# Patient Record
Sex: MALE | Race: BLACK OR AFRICAN AMERICAN | Employment: FULL TIME | URBAN - METROPOLITAN AREA
[De-identification: names, ages, dates, MRNs, and addresses within clinical notes are randomized per-mention and may not be internally consistent; named-entity substitution may affect disease eponyms.]

---

## 2018-01-17 ENCOUNTER — OFFICE VISIT (OUTPATIENT)
Dept: ORTHOPEDIC SURGERY | Age: 63
End: 2018-01-17

## 2018-01-17 ENCOUNTER — HOSPITAL ENCOUNTER (OUTPATIENT)
Dept: PREADMISSION TESTING | Age: 63
Discharge: HOME | End: 2018-01-17
Payer: COMMERCIAL

## 2018-01-17 ENCOUNTER — HOSPITAL ENCOUNTER (OUTPATIENT)
Dept: LAB | Age: 63
Discharge: HOME | End: 2018-01-17

## 2018-01-17 ENCOUNTER — HOSPITAL ENCOUNTER (OUTPATIENT)
Dept: GENERAL RADIOLOGY | Age: 63
Discharge: HOME | End: 2018-01-17
Payer: COMMERCIAL

## 2018-01-17 VITALS
HEART RATE: 107 BPM | OXYGEN SATURATION: 97 % | WEIGHT: 189 LBS | DIASTOLIC BLOOD PRESSURE: 90 MMHG | RESPIRATION RATE: 18 BRPM | BODY MASS INDEX: 27.06 KG/M2 | SYSTOLIC BLOOD PRESSURE: 132 MMHG | HEIGHT: 70 IN

## 2018-01-17 DIAGNOSIS — M87.051 AVASCULAR NECROSIS OF HIP, RIGHT (HCC): Primary | ICD-10-CM

## 2018-01-17 DIAGNOSIS — B20 HIV (HUMAN IMMUNODEFICIENCY VIRUS INFECTION) (HCC): ICD-10-CM

## 2018-01-17 DIAGNOSIS — M16.11 PRIMARY OSTEOARTHRITIS OF RIGHT HIP: ICD-10-CM

## 2018-01-17 DIAGNOSIS — M25.551 RIGHT HIP PAIN: ICD-10-CM

## 2018-01-17 DIAGNOSIS — Z01.810 PRE-OPERATIVE CARDIOVASCULAR EXAMINATION: ICD-10-CM

## 2018-01-17 DIAGNOSIS — Z01.812 BLOOD TESTS PRIOR TO TREATMENT OR PROCEDURE: ICD-10-CM

## 2018-01-17 DIAGNOSIS — M87.051 AVASCULAR NECROSIS OF HIP, RIGHT (HCC): ICD-10-CM

## 2018-01-17 DIAGNOSIS — Z01.811 PRE-OPERATIVE RESPIRATORY EXAMINATION: ICD-10-CM

## 2018-01-17 DIAGNOSIS — M87.051 AVASCULAR NECROSIS OF BONE OF RIGHT HIP (HCC): Primary | ICD-10-CM

## 2018-01-17 LAB
ABO + RH BLD: NORMAL
ANION GAP SERPL CALC-SCNC: 8 MMOL/L (ref 3–18)
APPEARANCE UR: CLEAR
BASOPHILS # BLD: 0 K/UL (ref 0–0.06)
BASOPHILS NFR BLD: 0 % (ref 0–2)
BILIRUB UR QL: NEGATIVE
BLOOD GROUP ANTIBODIES SERPL: NORMAL
BUN SERPL-MCNC: 14 MG/DL (ref 7–18)
BUN/CREAT SERPL: 15 (ref 12–20)
CALCIUM SERPL-MCNC: 9 MG/DL (ref 8.5–10.1)
CHLORIDE SERPL-SCNC: 107 MMOL/L (ref 100–108)
CO2 SERPL-SCNC: 24 MMOL/L (ref 21–32)
COLOR UR: YELLOW
CREAT SERPL-MCNC: 0.94 MG/DL (ref 0.6–1.3)
DIFFERENTIAL METHOD BLD: ABNORMAL
EOSINOPHIL # BLD: 0.2 K/UL (ref 0–0.4)
EOSINOPHIL NFR BLD: 2 % (ref 0–5)
ERYTHROCYTE [DISTWIDTH] IN BLOOD BY AUTOMATED COUNT: 12.3 % (ref 11.6–14.5)
GLUCOSE SERPL-MCNC: 87 MG/DL (ref 74–99)
GLUCOSE UR STRIP.AUTO-MCNC: NEGATIVE MG/DL
HCT VFR BLD AUTO: 41 % (ref 36–48)
HGB BLD-MCNC: 14.4 G/DL (ref 13–16)
HGB UR QL STRIP: NEGATIVE
KETONES UR QL STRIP.AUTO: NEGATIVE MG/DL
LEUKOCYTE ESTERASE UR QL STRIP.AUTO: NEGATIVE
LYMPHOCYTES # BLD: 2.2 K/UL (ref 0.9–3.6)
LYMPHOCYTES NFR BLD: 32 % (ref 21–52)
MCH RBC QN AUTO: 32 PG (ref 24–34)
MCHC RBC AUTO-ENTMCNC: 35.1 G/DL (ref 31–37)
MCV RBC AUTO: 91.1 FL (ref 74–97)
MONOCYTES # BLD: 0.5 K/UL (ref 0.05–1.2)
MONOCYTES NFR BLD: 7 % (ref 3–10)
NEUTS SEG # BLD: 4.1 K/UL (ref 1.8–8)
NEUTS SEG NFR BLD: 59 % (ref 40–73)
NITRITE UR QL STRIP.AUTO: NEGATIVE
PH UR STRIP: 6 [PH] (ref 5–8)
PLATELET # BLD AUTO: 161 K/UL (ref 135–420)
PMV BLD AUTO: 11.3 FL (ref 9.2–11.8)
POTASSIUM SERPL-SCNC: 4 MMOL/L (ref 3.5–5.5)
PROT UR STRIP-MCNC: NEGATIVE MG/DL
RBC # BLD AUTO: 4.5 M/UL (ref 4.7–5.5)
SENTARA SPECIMEN COL,SENBCF: NORMAL
SODIUM SERPL-SCNC: 139 MMOL/L (ref 136–145)
SP GR UR REFRACTOMETRY: 1.03 (ref 1–1.03)
SPECIMEN EXP DATE BLD: NORMAL
UROBILINOGEN UR QL STRIP.AUTO: 1 EU/DL (ref 0.2–1)
WBC # BLD AUTO: 6.9 K/UL (ref 4.6–13.2)

## 2018-01-17 PROCEDURE — 87086 URINE CULTURE/COLONY COUNT: CPT | Performed by: PHYSICIAN ASSISTANT

## 2018-01-17 PROCEDURE — 81003 URINALYSIS AUTO W/O SCOPE: CPT | Performed by: PHYSICIAN ASSISTANT

## 2018-01-17 PROCEDURE — 85025 COMPLETE CBC W/AUTO DIFF WBC: CPT | Performed by: PHYSICIAN ASSISTANT

## 2018-01-17 PROCEDURE — 99001 SPECIMEN HANDLING PT-LAB: CPT | Performed by: PHYSICIAN ASSISTANT

## 2018-01-17 PROCEDURE — 80048 BASIC METABOLIC PNL TOTAL CA: CPT | Performed by: PHYSICIAN ASSISTANT

## 2018-01-17 PROCEDURE — 86901 BLOOD TYPING SEROLOGIC RH(D): CPT | Performed by: PHYSICIAN ASSISTANT

## 2018-01-17 PROCEDURE — 93005 ELECTROCARDIOGRAM TRACING: CPT

## 2018-01-17 PROCEDURE — 71046 X-RAY EXAM CHEST 2 VIEWS: CPT

## 2018-01-17 PROCEDURE — 83036 HEMOGLOBIN GLYCOSYLATED A1C: CPT | Performed by: PHYSICIAN ASSISTANT

## 2018-01-17 RX ORDER — HYDROCODONE BITARTRATE AND ACETAMINOPHEN 7.5; 325 MG/1; MG/1
1-2 TABLET ORAL
Qty: 60 TAB | Refills: 0 | Status: SHIPPED | OUTPATIENT
Start: 2018-01-17 | End: 2018-01-24

## 2018-01-17 NOTE — MR AVS SNAPSHOT
[address] [place] [address], [address] 200 [facility] 
[phone] Patient: Edelmira Parra MRN: [id number] FMB:[birth year] Visit Information Date & Time Provider Department Dept. Phone Encounter #  
 1/17/2018  9:05 AM Belle Gutierrez MD [facility] [phone] [id number] Follow-up Instructions Return if symptoms worsen or fail to improve. Upcoming Health Maintenance Date Due Hepatitis C Screening 1955 DTaP/Tdap/Td series (1 - Tdap) 8/10/1978 FOBT Q 1 YEAR AGE 50-75 8/10/2007 ZOSTER VACCINE AGE 60> 6/10/2017 Influenza Age 5 to Adult 8/1/2017 Allergies as of 1/17/2018  Review Complete On: 1/17/2018 By: Brandy Silva No Known Allergies Current Immunizations  Never Reviewed No immunizations on file. Not reviewed this visit You Were Diagnosed With   
  
 Codes Comments Avascular necrosis of bone of right hip ([facility]    -  Primary ICD-10-CM: M87.051 ICD-9-CM: 733.42 Primary osteoarthritis of right hip     ICD-10-CM: M16.11 
ICD-9-CM: 715.15 severe end stage Right hip pain     ICD-10-CM: M25.551 ICD-9-CM: 719.45   
 HIV (human immunodeficiency virus infection) ([facility].)     ICD-10-CM: B20 
ICD-9-CM: V08 Vitals BP Pulse Resp Height(growth percentile) Weight(growth percentile) SpO2  
 132/90 (BP 1 Location: Left arm, BP Patient Position: Sitting) (!) 107 18 5' 10\" (1.778 m) 189 lb (85.7 kg) 97% BMI Smoking Status 27.12 kg/m2 Never Smoker Vitals History BMI and BSA Data Body Mass Index Body Surface Area  
 27.12 kg/m 2 2.06 m 2 Your Updated Medication List  
  
Notice  As of 1/17/2018  9:47 AM  
 You have not been prescribed any medications. We Performed the Following AMB POC X-RAY RADEX HIP UNI WITH PELVIS 2-3 VIEWS [47431 CPT(R)] Follow-up Instructions Return if symptoms worsen or fail to improve. Patient Instructions Learning About Total Hip Replacement Surgery What is total hip replacement surgery? During total hip replacement surgery, your doctor replaces the worn parts of your hip joint with artificial parts made of metal, ceramic, or plastic. You may want this surgery if you have hip pain and trouble moving that you can't treat in other ways. Osteoarthritis or rheumatoid arthritis can cause these types of problems. Another cause is bone loss due to a poor blood supply. Hip replacement is sometimes done after a hip fracture. How is this surgery done? In traditional hip replacement surgery, your doctor makes a 6- to 10-inch cut (incision) on the side or the back of your hip. Some muscles and other soft tissues, such as ligaments, are cut so the doctor can get to the hip joint. Hip replacement can also be done with one or two smaller cuts. This is called minimally invasive surgery. It may cause less blood loss and a smaller scar. But it can also mean a longer time in surgery, because the surgery is harder to do. And if the new hip can't be fitted properly through the smaller cut, the doctor may have to make a larger opening. A newer type of surgery is done through a small incision in the front (anterior) of the hip. Anterior surgery causes less damage to muscles and other soft tissues than getting to the hip joint from the side or the back. It may help you heal faster and get back to activity sooner. Anterior surgery and minimally invasive surgery require special training and equipment. Your doctor can explain your options and help you understand the risks and benefits of each type of surgery. You will get medicine to make you sleep during the surgery. After making the incision, your doctor will: · Remove the worn bone tissue and cartilage from the hip joint. · Replace the ball at the upper end of your thighbone (femur). · Replace your hip socket with a shell and liner. · Fit the ball into the shell and liner to make a new hip joint. Surgery may take 1 to 3 hours. There are two kinds of replacement joints. · Cemented joints. The cement fits between the new joint and the bone. · Uncemented joints. These have a metal coating with many small openings. The bone is shaped to fit the new joint almost perfectly. But there are some small spaces. Over time, the bone grows to fill these small openings. Sometimes, a doctor will use a cemented ball and an uncemented socket. Your doctor can tell you which type of new hip joint is best for you. What can you expect after a total hip replacement? You may stay in the hospital for 1 to 4 days after surgery. Your physical therapy will start at this time. The physical therapist will show you how to move safely. You will also learn exercises to help you get stronger. You may need physical therapy for several weeks after you leave the hospital. At home you'll keep doing the exercises you learned. You will be able to move around with crutches or a walker. But for a while you will need someone who can help you day and night. You can go home from the hospital if you have help at home and your recovery is going well. If you don't have help at home or if you need extra care, you can go to a rehabilitation center. Your doctor will give you information about what to expect after surgery. How long it takes to recover will depend in part on the type of surgery you have. After traditional surgery, you will slowly return to most of your activities. · You will need to use crutches or a walker for the first few weeks after surgery. · Your doctor will tell you when you can drive again. · You may be able to go back to work in 4 weeks to 4 months. It depends on your job. · Your doctor will tell you when you can do activities like swim, dance, golf, or bicycle. · You may need to avoid some strenuous activities. These may include running, horseback riding, tennis, and any type of skiing. · For most people it is safe to have sex about 4 to 6 weeks after the surgery. It usually takes 3 to 6 months to get back to full activity after traditional surgery. You may recover faster if you have minimally invasive or anterior surgery. After you recover from surgery, you may have much less pain than before and a better quality of life. Most artificial hip joints last for 10 to 20 years or longer. It depends on your age, how much stress you put on the joint, and how well your new joint and bones mend. Your weight can make a difference. Every extra pound of body weight adds 3 pounds of stress to your new hip joint. Controlling your weight can help your new hip joint last longer. It should also last longer if you avoid hard physical work and sports that stress the joint. Your doctor may want to see you about once a year to see how you and your new hip are doing. Follow-up care is a key part of your treatment and safety. Be sure to make and go to all appointments, and call your doctor if you are having problems. It's also a good idea to know your test results and keep a list of the medicines you take. Where can you learn more? Go to http://july-bella.info/. Enter R557 in the search box to learn more about \"Learning About Total Hip Replacement Surgery. \" Current as of: March 21, 2017 Content Version: 11.4 © 0199-5871 [employer]. Care instructions adapted under license by [employer] (which disclaims liability or warranty for this information). If you have questions about a medical condition or this instruction, always ask your healthcare professional. [place] [address] any warranty or liability for your use of this information. Learning About Low-Carbohydrate Diets for Weight Loss What is a low-carbohydrate diet? Low-carb diets avoid foods that are high in carbohydrate. These high-carb foods include pasta, bread, rice, cereal, fruits, and starchy vegetables. Instead, these diets usually have you eat foods that are high in fat and protein. Many people lose weight quickly on a low-carb diet. But the early weight loss is water. People on this diet often gain the weight back after they start eating carbs again. Not all diet plans are safe or work well. A lot of the evidence shows that low-carb diets aren't healthy. That's because these diets often don't include healthy foods like fruits and vegetables. Losing weight safely means balancing protein, fat, and carbs with every meal and snack. And low-carb diets don't always provide the vitamins, minerals, and fiber you need. If you have a serious medical condition, talk to your doctor before you try any diet. These conditions include kidney disease, heart disease, type 2 diabetes, high cholesterol, and high blood pressure. If you are pregnant, it may not be safe for your baby if you are on a low-carb diet. How can you lose weight safely? You might have heard that a diet plan helped another person lose weight. But that doesn't mean that it will work for you. It is very hard to stay on a diet that includes lots of big changes in your eating habits. If you want to get to a healthy weight and stay there, making healthy lifestyle changes will often work better than dieting. These steps can help. · Make a plan for change. Work with your doctor to create a plan that is right for you. · See a dietitian. He or she can show you how to make healthy changes in your eating habits. · Manage stress. If you have a lot of stress in your life, it can be hard to focus on making healthy changes to your daily habits. · Track your food and activity. You are likely to do better at losing weight if you keep track of what you eat and what you do. Follow-up care is a key part of your treatment and safety. Be sure to make and go to all appointments, and call your doctor if you are having problems. It's also a good idea to know your test results and keep a list of the medicines you take. Where can you learn more? Go to http://july-bella.info/. Enter A121 in the search box to learn more about \"Learning About Low-Carbohydrate Diets for Weight Loss. \" Current as of: May 12, 2017 Content Version: 11.4 © 9953-8274 Traveler | VIP. Care instructions adapted under license by [employer] (which disclaims liability or warranty for this information). If you have questions about a medical condition or this instruction, always ask your healthcare professional. [address] any warranty or liability for your use of this information. Introducing [facility] [place] [facility]! [employer] introduces [facility] patient portal. Now you can access parts of your medical record, email your doctor's office, and request medication refills online. 1. In your internet browser, go to [email or web address] 2. Click on the First Time User? Click Here link in the Sign In box. You will see the New Member Sign Up page. 3. Enter your [facility] Access Code exactly as it appears below. You will not need to use this code after youve completed the sign-up process. If you do not sign up before the expiration date, you must request a new code. · [facility] Access Code: [email or web address] Expires: 4/17/2018  9:46 AM 
 
4. Enter the last four digits of your Social Security Number (xxxx) and Date of Birth (mm/dd/yyyy) as indicated and click Submit. You will be taken to the next sign-up page. 5. Create a [employer] ID. This will be your [facility] login ID and cannot be changed, so think of one that is secure and easy to remember. 6. Create a [employer] password. You can change your password at any time. 7. Enter your Password Reset Question and Answer. This can be used at a later time if you forget your password. 8. Enter your e-mail address. You will receive e-mail notification when new information is available in [address] 9. Click Sign Up. You can now view and download portions of your medical record. 10. Click the Download Summary menu link to download a portable copy of your medical information. If you have questions, please visit the Frequently Asked Questions section of the [employer] website. Remember, [employer] is NOT to be used for urgent needs. For medical emergencies, dial 911. Now available from your iPhone and Android! Please provide this summary of care documentation to your next provider. If you have any questions after today's visit, please call [phone].

## 2018-01-17 NOTE — PROGRESS NOTES
Patient: Niko Siegel                MRN: [id number]       SSN: [id number]  YOB: 1955        AGE: 63 y.o. SEX: male    PCP: No primary care provider on file. 01/17/18    Chief Complaint   Patient presents with    Hip Pain     rt     HISTORY:  Niko Siegel is a 63 y.o. male who is seen for right hip pain. He reports pain for since March 2017 with no history of injury. He was previously treated at [facility] in September 2017 and received an injection with some relief. He states he is in severe pain all day every day. He denies any h/o long-term steroid use. He reports that he relates his onset of AVN to his long term use of anti-viral HIV medications. He reports pain radiating down through his right leg. He states he walks with a severe limp. Pain Assessment  1/17/2018   Location of Pain Hip   Location Modifiers Right   Severity of Pain 10   Quality of Pain Sharp; Throbbing;Aching   Duration of Pain Persistent   Frequency of Pain Constant   Aggravating Factors Walking;Standing   Limiting Behavior Yes   Result of Injury No     Occupation, etc:  Mr. ABDI COBB works as a  for [employer]. He lives in [place] with his wife. Current weight is 189 pounds. He is 5'10\" tall. He is HIV positive as a result of long-term IV drug use. He states that his Hepatitis C has cleared up at this point. No results found for: HBA1C, HGBE8, WBU0PKQU, RZN1EYIH, BZX1BACT  Weight Metrics 1/17/2018   Weight 189 lb   BMI 27.12 kg/m2       There is no problem list on file for this patient. REVIEW OF SYSTEMS: All Below are Negative except: See HPI   Constitutional: negative for fever, chills, and weight loss. Cardiovascular: negative for chest pain, claudication, leg swelling, SOB, MCMAHON   Gastrointestinal: Negative for pain, N/V/C/D, Blood in stool or urine, dysuria,  hematuria, incontinence, pelvic pain.    Musculoskeletal: See HPI   Neurological: Negative for dizziness and weakness. Negative for headaches, Visual changes, confusion, seizures   Phychiatric/Behavioral: Negative for depression, memory loss, substance  abuse. Extremities: Negative for hair changes, rash, or skin lesion changes. Hematologic: Negative for bleeding problems, bruising, pallor or swollen lymph  nodes   Peripheral Vascular: No calf pain, no circulation deficits. Social History     Social History    Marital status: UNKNOWN     Spouse name: N/A    Number of children: N/A    Years of education: N/A     Occupational History    Not on file. Social History Main Topics    Smoking status: Never Smoker    Smokeless tobacco: Never Used    Alcohol use No    Drug use: No    Sexual activity: Yes     Partners: Female     Other Topics Concern    Not on file     Social History Narrative    No narrative on file      No Known Allergies   No current outpatient prescriptions on file. No current facility-administered medications for this visit. PHYSICAL EXAMINATION:  Visit Vitals    /90 (BP 1 Location: Left arm, BP Patient Position: Sitting)    Pulse (!) 107    Resp 18    Ht 5' 10\" (1.778 m)    Wt 189 lb (85.7 kg)    SpO2 97%    BMI 27.12 kg/m2      PHYSICAL EXAM:  Visit Vitals    /90 (BP 1 Location: Left arm, BP Patient Position: Sitting)    Pulse (!) 107    Resp 18    Ht 5' 10\" (1.778 m)    Wt 189 lb (85.7 kg)    SpO2 97%    BMI 27.12 kg/m2       Appearance: Alert, well appearing and pleasant patient who is in no distress, oriented to person, place/time, and who follows commands. HEENT: Niko Siegel hears well, does not require hearing aids. His sclera of the eyes are non-icteric. He is breathing normally and no respiratory accessory muscle use is noted. No JVD present and Neck ROM within normal limits. Psychiatric: Affect and mood are appropriate.  Oriented x3  Heart[de-identified]  S1, S2 without murmer, regular rhythm  Lungs:  Breath sounds clear to auscultation  Cardiovascular/Peripheral Vascular: Normal pulses to each foot. Integumentary: No rashes,  wounds, or abrasions. Warm and normal color. No drainage. Gait: severe limp  Sensory Exam: Intact/Normal Sensation    Lymphatic: No evidence of Lymphedema  Vascular:       Pulses: palpable  Varicosities none  Wounds/Abrasion: None Present  Neuro: Negative, no tremors  ORTHO EXAMINATION:  Examination Right hip Left hip   Skin Intact Intact   External Rotation ROM 5 20   Internal Rotation ROM 0 10   Trochanteric tenderness - -   Hip flexion contracture 5 degree -   Antalgic gait + +   Trendelenberg sign + -   Lumbar tenderness - -   Straight leg raise - -   Calf tenderness - -   Neurovascular Intact Intact   Severe pain with hip rotation  Wearing a soft knee sleeve on the right     MRI RIGHT HIP 7/21/17  IMPRESSION:   1. Avascular necrosis of the right femoral head noted although no appreciable subchondral flattening/collapse is seen. There is surrounding reactive marrow edema which extends caudally to the proximal intertrochanteric region of the right hip joint noted. Mild to moderate degenerative changes of the right hip joint noted. 2. Moderate T2 hyperintense soft tissue signal intensite noted about the right hip joint. Nonspecific whether related to reactive effusion or synovitis. 3. A smaller focus of avascular necrosis arises along the anterior superior margin of the contralateral left femoral head. 4. Mild partial thickness tearing of the left common hamstring tendon origin noted. 5. An intermuscular lipoma measuring 2.4x4.5x8.3 cm arises between the right rectus femoris, tensor fascia starla, and vastus lateralis muscles. RADIOGRAPHS:  XR RIGHT HIP 1/17/18  IMPRESSION:  AP pelvis and two views - No fractures, complete joint space narrowing, + osteophytes present. Evidence of AVN on the right. + subchondral collapse/fragmentation. IMPRESSION:      ICD-10-CM ICD-9-CM    1.  Avascular necrosis of bone of right hip (HCC) M87.051 733.42 AMB SUPPLY ORDER      HYDROcodone-acetaminophen (NORCO) 7.5-325 mg per tablet   2. Primary osteoarthritis of right hip M16.11 715.15 AMB SUPPLY ORDER      HYDROcodone-acetaminophen (NORCO) 7.5-325 mg per tablet    severe end stage   3. Right hip pain M25.551 719.45 AMB SUPPLY ORDER      HYDROcodone-acetaminophen (NORCO) 7.5-325 mg per tablet   4. HIV (human immunodeficiency virus infection) ([facility] B20 V08      PLAN:  He will be scheduled for a right JESSICA. Risks of surgery outlined and informed consent obtained. Continue weight loss efforts with a low carb diet. He was provided with a single point cane today. He will take Norco for the pain as needed at night. He was provided with a note for work today.      Scribed by Darian Jordan ([facility]) as dictated by Johnathan Vines MD

## 2018-01-17 NOTE — LETTER
1/17/2018 9:46 AM 
 
Mr. [employer] [address] [facility] [place][note 2] [address] THE ABOVE PATIENT WAS SEEN TODAY.  
 
 
Sincerely, 
 
 
Kain Fung MD

## 2018-01-17 NOTE — PATIENT INSTRUCTIONS
Learning About Total Hip Replacement Surgery  What is total hip replacement surgery? During total hip replacement surgery, your doctor replaces the worn parts of your hip joint with artificial parts made of metal, ceramic, or plastic. You may want this surgery if you have hip pain and trouble moving that you can't treat in other ways. Osteoarthritis or rheumatoid arthritis can cause these types of problems. Another cause is bone loss due to a poor blood supply. Hip replacement is sometimes done after a hip fracture. How is this surgery done? In traditional hip replacement surgery, your doctor makes a 6- to 10-inch cut (incision) on the side or the back of your hip. Some muscles and other soft tissues, such as ligaments, are cut so the doctor can get to the hip joint. Hip replacement can also be done with one or two smaller cuts. This is called minimally invasive surgery. It may cause less blood loss and a smaller scar. But it can also mean a longer time in surgery, because the surgery is harder to do. And if the new hip can't be fitted properly through the smaller cut, the doctor may have to make a larger opening. A newer type of surgery is done through a small incision in the front (anterior) of the hip. Anterior surgery causes less damage to muscles and other soft tissues than getting to the hip joint from the side or the back. It may help you heal faster and get back to activity sooner. Anterior surgery and minimally invasive surgery require special training and equipment. Your doctor can explain your options and help you understand the risks and benefits of each type of surgery. You will get medicine to make you sleep during the surgery. After making the incision, your doctor will:  · Remove the worn bone tissue and cartilage from the hip joint. · Replace the ball at the upper end of your thighbone (femur). · Replace your hip socket with a shell and liner.   · Fit the ball into the shell and liner to make a new hip joint. Surgery may take 1 to 3 hours. There are two kinds of replacement joints. · Cemented joints. The cement fits between the new joint and the bone. · Uncemented joints. These have a metal coating with many small openings. The bone is shaped to fit the new joint almost perfectly. But there are some small spaces. Over time, the bone grows to fill these small openings. Sometimes, a doctor will use a cemented ball and an uncemented socket. Your doctor can tell you which type of new hip joint is best for you. What can you expect after a total hip replacement? You may stay in the hospital for 1 to 4 days after surgery. Your physical therapy will start at this time. The physical therapist will show you how to move safely. You will also learn exercises to help you get stronger. You may need physical therapy for several weeks after you leave the hospital. At home you'll keep doing the exercises you learned. You will be able to move around with crutches or a walker. But for a while you will need someone who can help you day and night. You can go home from the hospital if you have help at home and your recovery is going well. If you don't have help at home or if you need extra care, you can go to a rehabilitation center. Your doctor will give you information about what to expect after surgery. How long it takes to recover will depend in part on the type of surgery you have. After traditional surgery, you will slowly return to most of your activities. · You will need to use crutches or a walker for the first few weeks after surgery. · Your doctor will tell you when you can drive again. · You may be able to go back to work in 4 weeks to 4 months. It depends on your job. · Your doctor will tell you when you can do activities like swim, dance, golf, or bicycle. · You may need to avoid some strenuous activities.  These may include running, horseback riding, tennis, and any type of skiing. · For most people it is safe to have sex about 4 to 6 weeks after the surgery. It usually takes 3 to 6 months to get back to full activity after traditional surgery. You may recover faster if you have minimally invasive or anterior surgery. After you recover from surgery, you may have much less pain than before and a better quality of life. Most artificial hip joints last for 10 to 20 years or longer. It depends on your age, how much stress you put on the joint, and how well your new joint and bones mend. Your weight can make a difference. Every extra pound of body weight adds 3 pounds of stress to your new hip joint. Controlling your weight can help your new hip joint last longer. It should also last longer if you avoid hard physical work and sports that stress the joint. Your doctor may want to see you about once a year to see how you and your new hip are doing. Follow-up care is a key part of your treatment and safety. Be sure to make and go to all appointments, and call your doctor if you are having problems. It's also a good idea to know your test results and keep a list of the medicines you take. Where can you learn more? Go to http://july-bella.info/. Enter G858 in the search box to learn more about \"Learning About Total Hip Replacement Surgery. \"  Current as of: March 21, 2017  Content Version: 11.4  © 2690-8828 Healthwise, [employer]. Care instructions adapted under license by [employer] (which disclaims liability or warranty for this information). If you have questions about a medical condition or this instruction, always ask your healthcare professional. [place] [address] any warranty or liability for your use of this information. Learning About Low-Carbohydrate Diets for Weight Loss  What is a low-carbohydrate diet? Low-carb diets avoid foods that are high in carbohydrate.  These high-carb foods include pasta, bread, rice, cereal, fruits, and starchy vegetables. Instead, these diets usually have you eat foods that are high in fat and protein. Many people lose weight quickly on a low-carb diet. But the early weight loss is water. People on this diet often gain the weight back after they start eating carbs again. Not all diet plans are safe or work well. A lot of the evidence shows that low-carb diets aren't healthy. That's because these diets often don't include healthy foods like fruits and vegetables. Losing weight safely means balancing protein, fat, and carbs with every meal and snack. And low-carb diets don't always provide the vitamins, minerals, and fiber you need. If you have a serious medical condition, talk to your doctor before you try any diet. These conditions include kidney disease, heart disease, type 2 diabetes, high cholesterol, and high blood pressure. If you are pregnant, it may not be safe for your baby if you are on a low-carb diet. How can you lose weight safely? You might have heard that a diet plan helped another person lose weight. But that doesn't mean that it will work for you. It is very hard to stay on a diet that includes lots of big changes in your eating habits. If you want to get to a healthy weight and stay there, making healthy lifestyle changes will often work better than dieting. These steps can help. · Make a plan for change. Work with your doctor to create a plan that is right for you. · See a dietitian. He or she can show you how to make healthy changes in your eating habits. · Manage stress. If you have a lot of stress in your life, it can be hard to focus on making healthy changes to your daily habits. · Track your food and activity. You are likely to do better at losing weight if you keep track of what you eat and what you do. Follow-up care is a key part of your treatment and safety. Be sure to make and go to all appointments, and call your doctor if you are having problems.  It's also a good idea to know your test results and keep a list of the medicines you take. Where can you learn more? Go to http://july-bella.info/. Enter A121 in the search box to learn more about \"Learning About Low-Carbohydrate Diets for Weight Loss. \"  Current as of: May 12, 2017  Content Version: 11.4  © 7683-0081 Healthwise, [employer]. Care instructions adapted under license by [email or web address] (which disclaims liability or warranty for this information). If you have questions about a medical condition or this instruction, always ask your healthcare professional. [address] any warranty or liability for your use of this information.

## 2018-01-19 ENCOUNTER — OFFICE VISIT (OUTPATIENT)
Dept: ORTHOPEDIC SURGERY | Facility: CLINIC | Age: 63
End: 2018-01-19

## 2018-01-19 VITALS
HEIGHT: 70 IN | BODY MASS INDEX: 27.03 KG/M2 | TEMPERATURE: 97.4 F | DIASTOLIC BLOOD PRESSURE: 86 MMHG | WEIGHT: 188.8 LBS | RESPIRATION RATE: 18 BRPM | SYSTOLIC BLOOD PRESSURE: 131 MMHG | HEART RATE: 101 BPM | OXYGEN SATURATION: 98 %

## 2018-01-19 DIAGNOSIS — M25.551 RIGHT HIP PAIN: ICD-10-CM

## 2018-01-19 DIAGNOSIS — Z01.812 BLOOD TESTS PRIOR TO TREATMENT OR PROCEDURE: ICD-10-CM

## 2018-01-19 DIAGNOSIS — M87.051 AVASCULAR NECROSIS OF HIP, RIGHT (HCC): Primary | ICD-10-CM

## 2018-01-19 DIAGNOSIS — M16.11 PRIMARY OSTEOARTHRITIS OF RIGHT HIP: ICD-10-CM

## 2018-01-19 DIAGNOSIS — M87.051 AVASCULAR NECROSIS OF BONE OF RIGHT HIP (HCC): Primary | ICD-10-CM

## 2018-01-19 DIAGNOSIS — Z01.818 PREOP GENERAL PHYSICAL EXAM: ICD-10-CM

## 2018-01-19 LAB
ATRIAL RATE: 97 BPM
BACTERIA SPEC CULT: NORMAL
CALCULATED P AXIS, ECG09: 66 DEGREES
CALCULATED R AXIS, ECG10: 75 DEGREES
CALCULATED T AXIS, ECG11: 56 DEGREES
DIAGNOSIS, 93000: NORMAL
HBA1C MFR BLD: 5.7 % (ref 4.2–5.6)
P-R INTERVAL, ECG05: 152 MS
Q-T INTERVAL, ECG07: 342 MS
QRS DURATION, ECG06: 70 MS
QTC CALCULATION (BEZET), ECG08: 434 MS
SERVICE CMNT-IMP: NORMAL
VENTRICULAR RATE, ECG03: 97 BPM

## 2018-01-19 RX ORDER — NABUMETONE 500 MG/1
TABLET, FILM COATED ORAL 2 TIMES DAILY
COMMUNITY
End: 2018-01-24

## 2018-01-19 RX ORDER — DICLOFENAC SODIUM 10 MG/G
GEL TOPICAL 4 TIMES DAILY
COMMUNITY
End: 2018-01-24

## 2018-01-19 RX ORDER — TIZANIDINE HYDROCHLORIDE 4 MG/1
4 CAPSULE, GELATIN COATED ORAL 3 TIMES DAILY
COMMUNITY
End: 2018-01-24

## 2018-01-19 NOTE — LETTER
[facility] - [facility] 
[address] [place] [address] [address] 
[phone] Workl Note Date: 1/19/2018 To Whom It May concern: 
 
Charisse Chavez was seen and treated today in the orthopaedic clinic. Charisse Chavez is having a right total hip replacement on 01/23/18. His time out of work is expected to be 6 months from his surgical date. Please Note: Recoveries post operative are subject to change based on his improvement that is assessed on a regular basis.  
 
 
 
 
 
 
Marlyn Ramos PA-C

## 2018-01-19 NOTE — PROGRESS NOTES
Joya Theodore returns for History and Physical in preparation of upcoming right total hip replacement. Please see the attached forms in Epic for History, Physical, and Review of systems.

## 2018-01-19 NOTE — H&P
History and Physical          63 year AA male seen in preparation for Right total hip arthroplasty  Review of Systems   Constitutional: Positive for activity change (decreased use right hip secondary to endstage osteo arthritis). Negative for chills, fatigue and fever. HENT: Negative for ear pain. Eyes: Negative for pain. Respiratory: Negative for shortness of breath. Cardiovascular: Negative. Endocrine: Negative. Genitourinary: Negative for flank pain. Musculoskeletal: Positive for arthralgias, gait problem, joint swelling and myalgias. Right hip   Skin: Negative. Allergic/Immunologic: Negative. Hematological: Negative. Psychiatric/Behavioral: Negative. Physical Exam   Nursing note and vitals reviewed. Constitutional: He is oriented to person, place, and time. He appears well-developed and well-nourished. HENT:   Head: Normocephalic and atraumatic. Eyes: Conjunctivae and EOM are normal. Pupils are equal, round, and reactive to light. Neck: Normal range of motion. Cardiovascular: Normal rate, regular rhythm, normal heart sounds and intact distal pulses. Pulmonary/Chest: Effort normal and breath sounds normal.   Musculoskeletal:        Right hip: He exhibits decreased range of motion, decreased strength, tenderness and bony tenderness. Legs:  Neurological: He is alert and oriented to person, place, and time. Skin: Skin is warm, dry and intact. Psychiatric: He has a normal mood and affect. His speech is normal and behavior is normal. Judgment and thought content normal. Cognition and memory are normal.     Right hip severe end stage osteo arthritis    Decreased ROM right hip secondary to above    Right total hip arthroplasty    Risk / Benefit: Surgeon will discuss in \"face to face\" encounter on day of surgery.     Family History and Surgical History reviewed, discussed in detail, and deemed Non-Contributory in preparation for this surgical encounter. Orthopaedically, this patient requires admission as predetermined by the attending physicians documented severity of the admitting diagnosis,  and expected need for post surgical and co morbity health management determines length of projected stay.

## 2018-01-22 ENCOUNTER — HOSPITAL ENCOUNTER (OUTPATIENT)
Dept: PREADMISSION TESTING | Age: 63
Discharge: HOME | End: 2018-01-22
Payer: COMMERCIAL

## 2018-01-22 ENCOUNTER — ANESTHESIA EVENT (OUTPATIENT)
Dept: SURGERY | Age: 63
DRG: 469 | End: 2018-01-22
Payer: COMMERCIAL

## 2018-01-22 DIAGNOSIS — M16.11 PRIMARY OSTEOARTHRITIS OF RIGHT HIP: ICD-10-CM

## 2018-01-22 DIAGNOSIS — M87.051 AVASCULAR NECROSIS OF HIP, RIGHT (HCC): ICD-10-CM

## 2018-01-22 DIAGNOSIS — M25.551 RIGHT HIP PAIN: ICD-10-CM

## 2018-01-22 DIAGNOSIS — Z01.812 BLOOD TESTS PRIOR TO TREATMENT OR PROCEDURE: ICD-10-CM

## 2018-01-22 LAB
APTT PPP: 31.1 SEC (ref 23–36.4)
INR PPP: 1.3 (ref 0.8–1.2)
PROTHROMBIN TIME: 15.3 SEC (ref 11.5–15.2)

## 2018-01-22 PROCEDURE — 85730 THROMBOPLASTIN TIME PARTIAL: CPT | Performed by: PHYSICIAN ASSISTANT

## 2018-01-22 PROCEDURE — 85610 PROTHROMBIN TIME: CPT | Performed by: PHYSICIAN ASSISTANT

## 2018-01-22 PROCEDURE — 36415 COLL VENOUS BLD VENIPUNCTURE: CPT | Performed by: PHYSICIAN ASSISTANT

## 2018-01-22 NOTE — PROGRESS NOTES
Mr Autumn Thacker and family attended the Joint Replacement Pre-Operative class on 01/23/2018. .  Topics discussed included surgery preparation, what to expect the day of surgery, medications, physical and occupational therapy, and discharge planning. It was discussed that this is considered an elective surgery and that prior to the surgery they need to make decisions such as arranging for help at home once they are discharged. He was given a total hip replacement patient education notebook to take home. Opportunity was given to ask questions and phone number of the Orthopaedic Nurse Clinician was given for any questions or concerns that may arise later. Patient states he has been doing his washes the last two nights.

## 2018-01-23 ENCOUNTER — ANESTHESIA (OUTPATIENT)
Dept: SURGERY | Age: 63
DRG: 469 | End: 2018-01-23
Payer: COMMERCIAL

## 2018-01-23 ENCOUNTER — HOSPITAL ENCOUNTER (INPATIENT)
Age: 63
LOS: 1 days | Discharge: HOME | DRG: 469 | End: 2018-01-24
Attending: SPECIALIST | Admitting: SPECIALIST
Payer: COMMERCIAL

## 2018-01-23 DIAGNOSIS — G89.18 POST-OP PAIN: Primary | ICD-10-CM

## 2018-01-23 PROBLEM — M16.11 OSTEOARTHRITIS OF RIGHT HIP: Status: ACTIVE | Noted: 2018-01-23

## 2018-01-23 LAB
AMPHET UR QL SCN: NEGATIVE
BARBITURATES UR QL SCN: NEGATIVE
BENZODIAZ UR QL: NEGATIVE
CANNABINOIDS UR QL SCN: NEGATIVE
COCAINE UR QL SCN: NEGATIVE
HCT VFR BLD AUTO: 35.2 % (ref 36–48)
HDSCOM,HDSCOM: NORMAL
HGB BLD-MCNC: 12.2 G/DL (ref 13–16)
METHADONE UR QL: NEGATIVE
OPIATES UR QL: NEGATIVE
PCP UR QL: NEGATIVE

## 2018-01-23 PROCEDURE — 74011250637 HC RX REV CODE- 250/637: Performed by: SPECIALIST

## 2018-01-23 PROCEDURE — 74011250636 HC RX REV CODE- 250/636: Performed by: NURSE ANESTHETIST, CERTIFIED REGISTERED

## 2018-01-23 PROCEDURE — 77030020782 HC GWN BAIR PAWS FLX 3M -B: Performed by: SPECIALIST

## 2018-01-23 PROCEDURE — 76210000006 HC OR PH I REC 0.5 TO 1 HR: Performed by: SPECIALIST

## 2018-01-23 PROCEDURE — C1776 JOINT DEVICE (IMPLANTABLE): HCPCS | Performed by: SPECIALIST

## 2018-01-23 PROCEDURE — 77030026438 HC STYL ET INTUB CARD -A: Performed by: ANESTHESIOLOGY

## 2018-01-23 PROCEDURE — 74011258636 HC RX REV CODE- 258/636: Performed by: SPECIALIST

## 2018-01-23 PROCEDURE — 77030008683 HC TU ET CUF COVD -A: Performed by: ANESTHESIOLOGY

## 2018-01-23 PROCEDURE — 77030006835 HC BLD SAW SAG STRY -B: Performed by: SPECIALIST

## 2018-01-23 PROCEDURE — 97116 GAIT TRAINING THERAPY: CPT

## 2018-01-23 PROCEDURE — 77030031139 HC SUT VCRL2 J&J -A: Performed by: SPECIALIST

## 2018-01-23 PROCEDURE — 77030027138 HC INCENT SPIROMETER -A

## 2018-01-23 PROCEDURE — 77030018836 HC SOL IRR NACL ICUM -A: Performed by: SPECIALIST

## 2018-01-23 PROCEDURE — 74011000250 HC RX REV CODE- 250: Performed by: NURSE ANESTHETIST, CERTIFIED REGISTERED

## 2018-01-23 PROCEDURE — 77030034850: Performed by: SPECIALIST

## 2018-01-23 PROCEDURE — 74011250636 HC RX REV CODE- 250/636: Performed by: SPECIALIST

## 2018-01-23 PROCEDURE — 77030032490 HC SLV COMPR SCD KNE COVD -B: Performed by: SPECIALIST

## 2018-01-23 PROCEDURE — 77030008467 HC STPLR SKN COVD -B: Performed by: SPECIALIST

## 2018-01-23 PROCEDURE — 36415 COLL VENOUS BLD VENIPUNCTURE: CPT | Performed by: SPECIALIST

## 2018-01-23 PROCEDURE — 74011250637 HC RX REV CODE- 250/637: Performed by: NURSE ANESTHETIST, CERTIFIED REGISTERED

## 2018-01-23 PROCEDURE — 65270000029 HC RM PRIVATE

## 2018-01-23 PROCEDURE — 85018 HEMOGLOBIN: CPT | Performed by: SPECIALIST

## 2018-01-23 PROCEDURE — 77030011265 HC ELECTRD BLD HEX COVD -A: Performed by: SPECIALIST

## 2018-01-23 PROCEDURE — 74011000272 HC RX REV CODE- 272: Performed by: SPECIALIST

## 2018-01-23 PROCEDURE — 64520 N BLOCK LUMBAR/THORACIC: CPT | Performed by: ANESTHESIOLOGY

## 2018-01-23 PROCEDURE — 77030011640 HC PAD GRND REM COVD -A: Performed by: SPECIALIST

## 2018-01-23 PROCEDURE — 74011250636 HC RX REV CODE- 250/636: Performed by: PHYSICIAN ASSISTANT

## 2018-01-23 PROCEDURE — 77030013708 HC HNDPC SUC IRR PULS STRY –B: Performed by: SPECIALIST

## 2018-01-23 PROCEDURE — 74011000250 HC RX REV CODE- 250

## 2018-01-23 PROCEDURE — 77030019908 HC STETH ESOPH SIMS -A: Performed by: ANESTHESIOLOGY

## 2018-01-23 PROCEDURE — 77030020788: Performed by: SPECIALIST

## 2018-01-23 PROCEDURE — 77030003666 HC NDL SPINAL BD -A: Performed by: SPECIALIST

## 2018-01-23 PROCEDURE — 74011250636 HC RX REV CODE- 250/636

## 2018-01-23 PROCEDURE — 80307 DRUG TEST PRSMV CHEM ANLYZR: CPT | Performed by: SPECIALIST

## 2018-01-23 PROCEDURE — 77030013079 HC BLNKT BAIR HGGR 3M -A: Performed by: ANESTHESIOLOGY

## 2018-01-23 PROCEDURE — 97162 PT EVAL MOD COMPLEX 30 MIN: CPT

## 2018-01-23 PROCEDURE — 76010000172 HC OR TIME 2.5 TO 3 HR INTENSV-TIER 1: Performed by: SPECIALIST

## 2018-01-23 PROCEDURE — 0SR904A REPLACEMENT OF RIGHT HIP JOINT WITH CERAMIC ON POLYETHYLENE SYNTHETIC SUBSTITUTE, UNCEMENTED, OPEN APPROACH: ICD-10-PCS | Performed by: SPECIALIST

## 2018-01-23 PROCEDURE — 76060000036 HC ANESTHESIA 2.5 TO 3 HR: Performed by: SPECIALIST

## 2018-01-23 DEVICE — IMPLANTABLE DEVICE: Type: IMPLANTABLE DEVICE | Site: HIP | Status: FUNCTIONAL

## 2018-01-23 DEVICE — HEAD FEM DIA40MM HIP BIOLOX DELT OPT FOR G7 ACET SYS: Type: IMPLANTABLE DEVICE | Site: HIP | Status: FUNCTIONAL

## 2018-01-23 DEVICE — STEM FEM SZ 11 L142MM 133DEG DST HIP PPS STD OFFSET TYP 1: Type: IMPLANTABLE DEVICE | Site: HIP | Status: FUNCTIONAL

## 2018-01-23 DEVICE — SLEEVE TAPE ADPT OP BILOX NEG6 --: Type: IMPLANTABLE DEVICE | Site: HIP | Status: FUNCTIONAL

## 2018-01-23 DEVICE — LINER ACET SZ 24MM OD40MM +3MM UHMWPE MAX ROM RINGLOC +: Type: IMPLANTABLE DEVICE | Site: HIP | Status: FUNCTIONAL

## 2018-01-23 DEVICE — SCREW BNE L15MM OD6.5MM UNIV TI ACET ST NONLOCKING: Type: IMPLANTABLE DEVICE | Site: HIP | Status: FUNCTIONAL

## 2018-01-23 DEVICE — SHELL ACET OD54MM ID24MM PPS LIMIT H FIN RINGLOC +: Type: IMPLANTABLE DEVICE | Site: HIP | Status: FUNCTIONAL

## 2018-01-23 DEVICE — PLUG ACET DIA3/8-24MM UHMWPE APCL H MOD REGENEREX RINGLOC: Type: IMPLANTABLE DEVICE | Site: HIP | Status: FUNCTIONAL

## 2018-01-23 RX ORDER — FAMOTIDINE 20 MG/1
20 TABLET, FILM COATED ORAL ONCE
Status: COMPLETED | OUTPATIENT
Start: 2018-01-23 | End: 2018-01-23

## 2018-01-23 RX ORDER — ACETAMINOPHEN 325 MG/1
650 TABLET ORAL EVERY 6 HOURS
Status: DISCONTINUED | OUTPATIENT
Start: 2018-01-23 | End: 2018-01-24 | Stop reason: HOSPADM

## 2018-01-23 RX ORDER — SUCCINYLCHOLINE CHLORIDE 20 MG/ML
INJECTION INTRAMUSCULAR; INTRAVENOUS AS NEEDED
Status: DISCONTINUED | OUTPATIENT
Start: 2018-01-23 | End: 2018-01-23 | Stop reason: HOSPADM

## 2018-01-23 RX ORDER — SODIUM CHLORIDE 0.9 % (FLUSH) 0.9 %
5-10 SYRINGE (ML) INJECTION EVERY 8 HOURS
Status: DISCONTINUED | OUTPATIENT
Start: 2018-01-23 | End: 2018-01-23 | Stop reason: HOSPADM

## 2018-01-23 RX ORDER — TIZANIDINE 4 MG/1
4 TABLET ORAL 3 TIMES DAILY
Status: DISCONTINUED | OUTPATIENT
Start: 2018-01-23 | End: 2018-01-24 | Stop reason: HOSPADM

## 2018-01-23 RX ORDER — LIDOCAINE HYDROCHLORIDE 20 MG/ML
INJECTION, SOLUTION EPIDURAL; INFILTRATION; INTRACAUDAL; PERINEURAL AS NEEDED
Status: DISCONTINUED | OUTPATIENT
Start: 2018-01-23 | End: 2018-01-23 | Stop reason: HOSPADM

## 2018-01-23 RX ORDER — ROPIVACAINE HYDROCHLORIDE 2 MG/ML
30 INJECTION, SOLUTION EPIDURAL; INFILTRATION; PERINEURAL
Status: COMPLETED | OUTPATIENT
Start: 2018-01-23 | End: 2018-01-23

## 2018-01-23 RX ORDER — MIDAZOLAM HYDROCHLORIDE 1 MG/ML
2 INJECTION, SOLUTION INTRAMUSCULAR; INTRAVENOUS ONCE
Status: COMPLETED | OUTPATIENT
Start: 2018-01-23 | End: 2018-01-23

## 2018-01-23 RX ORDER — DEXTROSE, SODIUM CHLORIDE, SODIUM LACTATE, POTASSIUM CHLORIDE, AND CALCIUM CHLORIDE 5; .6; .31; .03; .02 G/100ML; G/100ML; G/100ML; G/100ML; G/100ML
75 INJECTION, SOLUTION INTRAVENOUS CONTINUOUS
Status: DISPENSED | OUTPATIENT
Start: 2018-01-23 | End: 2018-01-24

## 2018-01-23 RX ORDER — GLYCOPYRROLATE 0.2 MG/ML
INJECTION INTRAMUSCULAR; INTRAVENOUS AS NEEDED
Status: DISCONTINUED | OUTPATIENT
Start: 2018-01-23 | End: 2018-01-23 | Stop reason: HOSPADM

## 2018-01-23 RX ORDER — FENTANYL CITRATE 50 UG/ML
100 INJECTION, SOLUTION INTRAMUSCULAR; INTRAVENOUS ONCE
Status: COMPLETED | OUTPATIENT
Start: 2018-01-23 | End: 2018-01-23

## 2018-01-23 RX ORDER — OXYCODONE HYDROCHLORIDE 5 MG/1
5 TABLET ORAL
Status: DISCONTINUED | OUTPATIENT
Start: 2018-01-23 | End: 2018-01-24

## 2018-01-23 RX ORDER — SODIUM CHLORIDE 0.9 % (FLUSH) 0.9 %
5-10 SYRINGE (ML) INJECTION AS NEEDED
Status: DISCONTINUED | OUTPATIENT
Start: 2018-01-23 | End: 2018-01-24 | Stop reason: HOSPADM

## 2018-01-23 RX ORDER — SODIUM CHLORIDE, SODIUM LACTATE, POTASSIUM CHLORIDE, CALCIUM CHLORIDE 600; 310; 30; 20 MG/100ML; MG/100ML; MG/100ML; MG/100ML
75 INJECTION, SOLUTION INTRAVENOUS CONTINUOUS
Status: DISCONTINUED | OUTPATIENT
Start: 2018-01-23 | End: 2018-01-23 | Stop reason: HOSPADM

## 2018-01-23 RX ORDER — CELECOXIB 400 MG/1
400 CAPSULE ORAL
Status: COMPLETED | OUTPATIENT
Start: 2018-01-23 | End: 2018-01-23

## 2018-01-23 RX ORDER — SODIUM CHLORIDE, SODIUM LACTATE, POTASSIUM CHLORIDE, CALCIUM CHLORIDE 600; 310; 30; 20 MG/100ML; MG/100ML; MG/100ML; MG/100ML
50 INJECTION, SOLUTION INTRAVENOUS CONTINUOUS
Status: DISCONTINUED | OUTPATIENT
Start: 2018-01-23 | End: 2018-01-23 | Stop reason: HOSPADM

## 2018-01-23 RX ORDER — HYDROMORPHONE HYDROCHLORIDE 1 MG/ML
INJECTION, SOLUTION INTRAMUSCULAR; INTRAVENOUS; SUBCUTANEOUS AS NEEDED
Status: DISCONTINUED | OUTPATIENT
Start: 2018-01-23 | End: 2018-01-23 | Stop reason: HOSPADM

## 2018-01-23 RX ORDER — SODIUM CHLORIDE 0.9 % (FLUSH) 0.9 %
5-10 SYRINGE (ML) INJECTION AS NEEDED
Status: DISCONTINUED | OUTPATIENT
Start: 2018-01-23 | End: 2018-01-23 | Stop reason: HOSPADM

## 2018-01-23 RX ORDER — DIPHENHYDRAMINE HYDROCHLORIDE 50 MG/ML
12.5 INJECTION, SOLUTION INTRAMUSCULAR; INTRAVENOUS
Status: DISCONTINUED | OUTPATIENT
Start: 2018-01-23 | End: 2018-01-23 | Stop reason: HOSPADM

## 2018-01-23 RX ORDER — LIDOCAINE HYDROCHLORIDE 10 MG/ML
0.1 INJECTION, SOLUTION EPIDURAL; INFILTRATION; INTRACAUDAL; PERINEURAL AS NEEDED
Status: DISCONTINUED | OUTPATIENT
Start: 2018-01-23 | End: 2018-01-23 | Stop reason: HOSPADM

## 2018-01-23 RX ORDER — OXYCODONE HCL 20 MG/1
20 TABLET, FILM COATED, EXTENDED RELEASE ORAL ONCE
Status: DISCONTINUED | OUTPATIENT
Start: 2018-01-23 | End: 2018-01-23

## 2018-01-23 RX ORDER — INSULIN LISPRO 100 [IU]/ML
INJECTION, SOLUTION INTRAVENOUS; SUBCUTANEOUS ONCE
Status: DISCONTINUED | OUTPATIENT
Start: 2018-01-23 | End: 2018-01-23 | Stop reason: HOSPADM

## 2018-01-23 RX ORDER — ROCURONIUM BROMIDE 10 MG/ML
INJECTION, SOLUTION INTRAVENOUS AS NEEDED
Status: DISCONTINUED | OUTPATIENT
Start: 2018-01-23 | End: 2018-01-23 | Stop reason: HOSPADM

## 2018-01-23 RX ORDER — PREGABALIN 75 MG/1
75 CAPSULE ORAL
Status: COMPLETED | OUTPATIENT
Start: 2018-01-23 | End: 2018-01-23

## 2018-01-23 RX ORDER — DEXAMETHASONE SODIUM PHOSPHATE 4 MG/ML
INJECTION, SOLUTION INTRA-ARTICULAR; INTRALESIONAL; INTRAMUSCULAR; INTRAVENOUS; SOFT TISSUE AS NEEDED
Status: DISCONTINUED | OUTPATIENT
Start: 2018-01-23 | End: 2018-01-23 | Stop reason: HOSPADM

## 2018-01-23 RX ORDER — NALOXONE HYDROCHLORIDE 0.4 MG/ML
0.04 INJECTION, SOLUTION INTRAMUSCULAR; INTRAVENOUS; SUBCUTANEOUS AS NEEDED
Status: DISCONTINUED | OUTPATIENT
Start: 2018-01-23 | End: 2018-01-23 | Stop reason: HOSPADM

## 2018-01-23 RX ORDER — HYDROMORPHONE HYDROCHLORIDE 2 MG/ML
0.5 INJECTION, SOLUTION INTRAMUSCULAR; INTRAVENOUS; SUBCUTANEOUS
Status: DISCONTINUED | OUTPATIENT
Start: 2018-01-23 | End: 2018-01-23 | Stop reason: HOSPADM

## 2018-01-23 RX ORDER — HYDROMORPHONE HYDROCHLORIDE 1 MG/ML
0.5 INJECTION, SOLUTION INTRAMUSCULAR; INTRAVENOUS; SUBCUTANEOUS
Status: DISCONTINUED | OUTPATIENT
Start: 2018-01-23 | End: 2018-01-23 | Stop reason: HOSPADM

## 2018-01-23 RX ORDER — CEFAZOLIN SODIUM 2 G/50ML
2 SOLUTION INTRAVENOUS
Status: COMPLETED | OUTPATIENT
Start: 2018-01-23 | End: 2018-01-23

## 2018-01-23 RX ORDER — ONDANSETRON 2 MG/ML
INJECTION INTRAMUSCULAR; INTRAVENOUS AS NEEDED
Status: DISCONTINUED | OUTPATIENT
Start: 2018-01-23 | End: 2018-01-23 | Stop reason: HOSPADM

## 2018-01-23 RX ORDER — FENTANYL CITRATE 50 UG/ML
INJECTION, SOLUTION INTRAMUSCULAR; INTRAVENOUS AS NEEDED
Status: DISCONTINUED | OUTPATIENT
Start: 2018-01-23 | End: 2018-01-23 | Stop reason: HOSPADM

## 2018-01-23 RX ORDER — SODIUM CHLORIDE 0.9 % (FLUSH) 0.9 %
5-10 SYRINGE (ML) INJECTION EVERY 8 HOURS
Status: DISCONTINUED | OUTPATIENT
Start: 2018-01-23 | End: 2018-01-24 | Stop reason: HOSPADM

## 2018-01-23 RX ORDER — PROPOFOL 10 MG/ML
INJECTION, EMULSION INTRAVENOUS AS NEEDED
Status: DISCONTINUED | OUTPATIENT
Start: 2018-01-23 | End: 2018-01-23 | Stop reason: HOSPADM

## 2018-01-23 RX ORDER — ALBUTEROL SULFATE 0.83 MG/ML
2.5 SOLUTION RESPIRATORY (INHALATION) AS NEEDED
Status: DISCONTINUED | OUTPATIENT
Start: 2018-01-23 | End: 2018-01-23 | Stop reason: HOSPADM

## 2018-01-23 RX ORDER — MIDAZOLAM HYDROCHLORIDE 1 MG/ML
INJECTION, SOLUTION INTRAMUSCULAR; INTRAVENOUS AS NEEDED
Status: DISCONTINUED | OUTPATIENT
Start: 2018-01-23 | End: 2018-01-23 | Stop reason: HOSPADM

## 2018-01-23 RX ORDER — NEOSTIGMINE METHYLSULFATE 1 MG/ML
INJECTION INTRAVENOUS AS NEEDED
Status: DISCONTINUED | OUTPATIENT
Start: 2018-01-23 | End: 2018-01-23 | Stop reason: HOSPADM

## 2018-01-23 RX ORDER — CEFAZOLIN SODIUM 2 G/50ML
2 SOLUTION INTRAVENOUS EVERY 8 HOURS
Status: COMPLETED | OUTPATIENT
Start: 2018-01-23 | End: 2018-01-24

## 2018-01-23 RX ORDER — ONDANSETRON 2 MG/ML
4 INJECTION INTRAMUSCULAR; INTRAVENOUS ONCE
Status: DISCONTINUED | OUTPATIENT
Start: 2018-01-23 | End: 2018-01-23 | Stop reason: HOSPADM

## 2018-01-23 RX ORDER — DOCUSATE SODIUM 100 MG/1
100 CAPSULE, LIQUID FILLED ORAL 2 TIMES DAILY
Status: DISCONTINUED | OUTPATIENT
Start: 2018-01-23 | End: 2018-01-24 | Stop reason: HOSPADM

## 2018-01-23 RX ORDER — ENOXAPARIN SODIUM 100 MG/ML
30 INJECTION SUBCUTANEOUS EVERY 24 HOURS
Status: DISCONTINUED | OUTPATIENT
Start: 2018-01-24 | End: 2018-01-24 | Stop reason: HOSPADM

## 2018-01-23 RX ADMIN — FENTANYL CITRATE 100 MCG: 50 INJECTION, SOLUTION INTRAMUSCULAR; INTRAVENOUS at 07:32

## 2018-01-23 RX ADMIN — HYDROMORPHONE HYDROCHLORIDE 1 MG: 1 INJECTION, SOLUTION INTRAMUSCULAR; INTRAVENOUS; SUBCUTANEOUS at 10:20

## 2018-01-23 RX ADMIN — MIDAZOLAM HYDROCHLORIDE 2 MG: 1 INJECTION, SOLUTION INTRAMUSCULAR; INTRAVENOUS at 07:27

## 2018-01-23 RX ADMIN — SODIUM CHLORIDE, SODIUM LACTATE, POTASSIUM CHLORIDE, AND CALCIUM CHLORIDE: 600; 310; 30; 20 INJECTION, SOLUTION INTRAVENOUS at 07:27

## 2018-01-23 RX ADMIN — TIZANIDINE 4 MG: 4 TABLET ORAL at 23:59

## 2018-01-23 RX ADMIN — SODIUM CHLORIDE, SODIUM LACTATE, POTASSIUM CHLORIDE, AND CALCIUM CHLORIDE 75 ML/HR: 600; 310; 30; 20 INJECTION, SOLUTION INTRAVENOUS at 06:27

## 2018-01-23 RX ADMIN — CEFAZOLIN SODIUM 2 G: 2 SOLUTION INTRAVENOUS at 18:09

## 2018-01-23 RX ADMIN — FENTANYL CITRATE 100 MCG: 50 INJECTION INTRAMUSCULAR; INTRAVENOUS at 07:12

## 2018-01-23 RX ADMIN — ROCURONIUM BROMIDE 10 MG: 10 INJECTION, SOLUTION INTRAVENOUS at 09:22

## 2018-01-23 RX ADMIN — FAMOTIDINE 20 MG: 20 TABLET, FILM COATED ORAL at 06:42

## 2018-01-23 RX ADMIN — CELECOXIB 400 MG: 400 CAPSULE ORAL at 06:42

## 2018-01-23 RX ADMIN — LIDOCAINE HYDROCHLORIDE 80 MG: 20 INJECTION, SOLUTION EPIDURAL; INFILTRATION; INTRACAUDAL; PERINEURAL at 07:32

## 2018-01-23 RX ADMIN — GLYCOPYRROLATE 0.2 MG: 0.2 INJECTION INTRAMUSCULAR; INTRAVENOUS at 07:27

## 2018-01-23 RX ADMIN — SODIUM CHLORIDE 1000 MG: 900 INJECTION, SOLUTION INTRAVENOUS at 06:32

## 2018-01-23 RX ADMIN — PROPOFOL 160 MG: 10 INJECTION, EMULSION INTRAVENOUS at 07:32

## 2018-01-23 RX ADMIN — ROPIVACAINE HYDROCHLORIDE 60 MG: 2 INJECTION, SOLUTION EPIDURAL; INFILTRATION at 07:18

## 2018-01-23 RX ADMIN — GLYCOPYRROLATE 0.6 MG: 0.2 INJECTION INTRAMUSCULAR; INTRAVENOUS at 10:15

## 2018-01-23 RX ADMIN — ROCURONIUM BROMIDE 10 MG: 10 INJECTION, SOLUTION INTRAVENOUS at 08:45

## 2018-01-23 RX ADMIN — Medication 10 ML: at 14:09

## 2018-01-23 RX ADMIN — SODIUM CHLORIDE, SODIUM LACTATE, POTASSIUM CHLORIDE, AND CALCIUM CHLORIDE: 600; 310; 30; 20 INJECTION, SOLUTION INTRAVENOUS at 08:30

## 2018-01-23 RX ADMIN — ROCURONIUM BROMIDE 5 MG: 10 INJECTION, SOLUTION INTRAVENOUS at 07:32

## 2018-01-23 RX ADMIN — NEOSTIGMINE METHYLSULFATE 4 MG: 1 INJECTION INTRAVENOUS at 10:15

## 2018-01-23 RX ADMIN — ROCURONIUM BROMIDE 25 MG: 10 INJECTION, SOLUTION INTRAVENOUS at 07:37

## 2018-01-23 RX ADMIN — SUCCINYLCHOLINE CHLORIDE 140 MG: 20 INJECTION INTRAMUSCULAR; INTRAVENOUS at 07:32

## 2018-01-23 RX ADMIN — ONDANSETRON 4 MG: 2 INJECTION INTRAMUSCULAR; INTRAVENOUS at 07:27

## 2018-01-23 RX ADMIN — ACETAMINOPHEN 650 MG: 325 TABLET ORAL at 22:08

## 2018-01-23 RX ADMIN — DEXAMETHASONE SODIUM PHOSPHATE 8 MG: 4 INJECTION, SOLUTION INTRA-ARTICULAR; INTRALESIONAL; INTRAMUSCULAR; INTRAVENOUS; SOFT TISSUE at 07:32

## 2018-01-23 RX ADMIN — Medication 10 ML: at 22:11

## 2018-01-23 RX ADMIN — PREGABALIN 75 MG: 75 CAPSULE ORAL at 06:42

## 2018-01-23 RX ADMIN — SODIUM CHLORIDE, SODIUM LACTATE, POTASSIUM CHLORIDE, AND CALCIUM CHLORIDE 50 ML/HR: 600; 310; 30; 20 INJECTION, SOLUTION INTRAVENOUS at 11:01

## 2018-01-23 RX ADMIN — LIDOCAINE HYDROCHLORIDE 0.1 ML: 10 INJECTION, SOLUTION EPIDURAL; INFILTRATION; INTRACAUDAL; PERINEURAL at 07:14

## 2018-01-23 RX ADMIN — SODIUM CHLORIDE, SODIUM LACTATE, POTASSIUM CHLORIDE, CALCIUM CHLORIDE, AND DEXTROSE MONOHYDRATE 75 ML/HR: 600; 310; 30; 20; 5 INJECTION, SOLUTION INTRAVENOUS at 13:00

## 2018-01-23 RX ADMIN — FENTANYL CITRATE 100 MCG: 50 INJECTION, SOLUTION INTRAMUSCULAR; INTRAVENOUS at 09:42

## 2018-01-23 RX ADMIN — CEFAZOLIN SODIUM 2 G: 2 SOLUTION INTRAVENOUS at 07:27

## 2018-01-23 RX ADMIN — MIDAZOLAM HYDROCHLORIDE 2 MG: 1 INJECTION, SOLUTION INTRAMUSCULAR; INTRAVENOUS at 07:12

## 2018-01-23 NOTE — IP AVS SNAPSHOT
Lydia Carlos 
 
 
 [address] Patient: Norma Arias MRN: [id number] About your hospitalization You were admitted on:  January 23, 2018 You last received care in the:  [facility] You were discharged on:  January 24, 2018 Why you were hospitalized Your primary diagnosis was:  Not on File Your diagnoses also included:  Osteoarthritis Of Right Hip Follow-up Information Follow up With Details Comments Contact Info Alireza Rosenberg MD   60 [facility] 83 [id number] 
[phone] Mary Vogt PA-C On 2/2/2018 Appointment at 1:00 pm 3300 [facility] [address] 
[address] [facility] [id number] [phone] Your Scheduled Appointments Friday February 02, 2018  1:00 PM EST  
POST OP with Mary Vogt PA-C  
[facility] [place] [address] ([address]) [address] [place] [address] [address] [facility] [address] [phone] Discharge Orders None A check arthur indicates which time of day the medication should be taken. My Medications START taking these medications Instructions Each Dose to Equal  
 Morning Noon Evening Bedtime  
 enoxaparin 30 mg/0.3 mL injection Commonly known as:  LOVENOX Start taking on:  1/25/2018 Your last dose was: Your next dose is: 0.3 mL by SubCUTAneous route every twenty-four (24) hours for 14 doses. Indications: DEEP VEIN THROMBOSIS PREVENTION, PULMONARY THROMBOEMBOLISM PREVENTION 30 mg  
    
   
   
   
  
 naloxegol 25 mg Tab tablet Commonly known as:  Rupinder Kirkpatrick Your last dose was: Your next dose is: Take 1 Tab by mouth Daily (before breakfast). Indications: OPIOID-INDUCED CONSTIPATION  
 25 mg  
    
   
   
   
  
 oxyCODONE IR 5 mg immediate release tablet Commonly known as:  Ro Weathers  
 Your last dose was: Your next dose is: Take 1-3 Tabs by mouth every four (4) hours as needed. Max Daily Amount: 90 mg. Indications: Pain 5-15 mg CONTINUE taking these medications Instructions Each Dose to Equal  
 Morning Noon Evening Bedtime TRIUMEQ tablet Generic drug:  abacavir-dolutegravir-lamiVUDine Your last dose was: Your next dose is: Take  by mouth daily. STOP taking these medications HYDROcodone-acetaminophen 7.5-325 mg per tablet Commonly known as:  NORCO  
   
  
 nabumetone 500 mg tablet Commonly known as:  RELAFEN  
   
  
 tiZANidine 4 mg capsule Commonly known as:  ZANAFLEX  
   
  
 VOLTAREN 1 % Gel Generic drug:  diclofenac Where to Get Your Medications Information on where to get these meds will be given to you by the nurse or doctor. ! Ask your nurse or doctor about these medications  
  enoxaparin 30 mg/0.3 mL injection  
 naloxegol 25 mg Tab tablet  
 oxyCODONE IR 5 mg immediate release tablet Discharge Instructions Discharge Instructions for Total Hip Replacement Patients · The dressing on your hip will be changed by the Home Health professional at the appropriate time. Keep your incision clean and dry. Do not apply any ointments to the incision. · You may shower as long as you keep you incision dry. When showering, leave your dressing on. The dressing is waterproof as long as the edges are sealed. · Notify your surgeon if: 
· Your temperature is greater than 100.5 · You have pain not controlled by your pain medication · You have increased drainage from your incision · You have increased redness or swelling in your leg · You have chest pain, shortness of breath, or any other problems · Do your exercises as instructed by the home physical therapist. 
 
· Follow your total hip precautions: · Do not cross your legs or feet · Do not turn your toes inward · Do not bed at your waist more than 90 degrees · Keep a firm pillow between your knees when sleeping or lying down. · You may use ice to your hip as needed. Do not apply the ice pack directly to your skin. Use a barrier such as your pant leg or a thin towel. · Walk once an hour during normal walking hours. · If you have SANTA hose (the white support stockings), remove them at bedtime and re-apply the hose in the morning for the next 2 weeks. Best of luck with your new hip and [address] for choosing the [address]! Patient armband removed and shredded DISCHARGE SUMMARY from Nurse PATIENT INSTRUCTIONS: 
 
 
F-face looks uneven A-arms unable to move or move unevenly S-speech slurred or non-existent T-time-call 911 as soon as signs and symptoms begin-DO NOT go Back to bed or wait to see if you get better-TIME IS BRAIN. Warning Signs of HEART ATTACK Call 911 if you have these symptoms: 
? Chest discomfort. Most heart attacks involve discomfort in the center of the chest that lasts more than a few minutes, or that goes away and comes back. It can feel like uncomfortable pressure, squeezing, fullness, or pain. ? Discomfort in other areas of the upper body. Symptoms can include pain or discomfort in one or both arms, the back, neck, jaw, or stomach. ? Shortness of breath with or without chest discomfort. ? Other signs may include breaking out in a cold sweat, nausea, or lightheadedness. Don't wait more than five minutes to call [address]! Fast action can save your life. Calling 911 is almost always the fastest way to get lifesaving treatment. Emergency Medical Services staff can begin treatment when they arrive  up to an hour sooner than if someone gets to the hospital by car. The discharge information has been reviewed with the patient. The patient verbalized understanding. Discharge medications reviewed with the patient and appropriate educational materials and side effects teaching were provided. ___________________________________________________________________________________________________________________________________ Introducing [facility]! [facility] introduces NowPublic patient portal. Now you can access parts of your medical record, email your doctor's office, and request medication refills online. 1. In your internet browser, go to [email or web address] 2. Click on the First Time User? Click Here link in the Sign In box. You will see the New Member Sign Up page. 3. Enter your NowPublic Access Code exactly as it appears below. You will not need to use this code after youve completed the sign-up process. If you do not sign up before the expiration date, you must request a new code. · NowPublic Access Code: [email or web address] Expires: 4/17/2018  9:46 AM 
 
4. Enter the last four digits of your Social Security Number (xxxx) and Date of Birth (mm/dd/yyyy) as indicated and click Submit. You will be taken to the next sign-up page. 5. Create a [employer] Media[employer] ID. This will be your [employer] login ID and cannot be changed, so think of one that is secure and easy to remember. 6. Create a [employer] password. You can change your password at any time. 7. Enter your Password Reset Question and Answer. This can be used at a later time if you forget your password. 8. Enter your e-mail address.  You will receive e-mail notification when new information is available in [employer]r Mobile. 9. Click Sign Up. You can now view and download portions of your medical record. 10. Click the Download Summary menu link to download a portable copy of your medical information. If you have questions, please visit the Frequently Asked Questions section of the [employer] Mobile website. Remember, [employer] is NOT to be used for urgent needs. For medical emergencies, dial 911. Now available from your iPhone and Android! Providers Seen During Your Hospitalization Provider Specialty Primary office phone Jorge Neri MD Orthopedic Surgery [phone] Your Primary Care Physician (PCP) Primary Care Physician Office Phone Office Fax 10 [facility] [address] [place] [address] [phone] You are allergic to the following No active allergies Recent Documentation Height Weight BMI Smoking Status 1.778 m 84.4 kg 26.69 kg/m2 Former Smoker Emergency Contacts Name Discharge Info Relation Home Work Mobile [facility] DISCHARGE CAREGIVER [3] Spouse [3] [phone] Patient Belongings The following personal items are in your possession at time of discharge: 
  Dental Appliances: None  Visual Aid: Glasses      Home Medications: None   Jewelry: None  Clothing: Pants, Shirt, Socks, Footwear    Other Valuables: Cane  Personal Items Sent to Safe: Caridad Roger (wife) will take Discharge Instructions Attachments/References ENOXAPARIN (BY INJECTION) (ENGLISH) ENOXAPARIN (LOVENOX) (ENGLISH) NALOXEGOL (BY MOUTH) (ENGLISH) OXYCODONE, RAPID RELEASE (BY MOUTH) (ENGLISH) Patient Handouts Enoxaparin (By injection) Enoxaparin (ee-nox-a-PAR-in) Prevents and treats blood clots. Also treats heart attacks. This medicine is a blood thinner. Brand Name(s): Amerinet Choice Enoxaparin Sodium, Enoxaparin Sodium Novaplus, Lovenox, PremierPro Rx Lovenox There may be other brand names for this medicine. When This Medicine Should Not Be Used: You should not use this medicine if you have had an allergic reaction to enoxaparin, heparin, benzyl alcohol, or products made from pork. You should not use enoxaparin if you have bleeding disorders or any active bleeding. How to Use This Medicine:  
Injectable · Your doctor will prescribe your exact dose and tell you how often it should be given. This medicine is given as a shot under your skin. · A nurse or other health provider will give you this medicine. It may also be given by a home health caregiver. · You may be taught how to give your medicine at home. Make sure you understand all instructions before giving yourself an injection. Do not use more medicine or use it more often than your doctor tells you to. · You will be shown the body areas where this shot can be given. Use a different body area each time you give yourself a shot. Keep track of where you give each shot to make sure you rotate body areas. · Use a new needle and syringe each time you inject your medicine. If a dose is missed:  
· You must use this medicine on a fixed schedule. Call your doctor or pharmacist if you miss a dose. How to Store and Dispose of This Medicine: · If you store this medicine at home, keep it at room temperature, away from heat and direct light. · If you were given a bottle of medicine to use with your syringes, you must use the medicine within 28 days after the first shot. Throw away the unused medicine in the bottle after 28 days. · Throw away used needles in a hard, closed container that the needles cannot poke through. Keep this container away from children and pets. · Ask your pharmacist, doctor, or health caregiver about the best way to dispose of any leftover medicine, containers, and other supplies. Throw away old medicine after the expiration date has passed. · Keep all medicine out of the reach of children. Never share your medicine with anyone. Drugs and Foods to Avoid: Ask your doctor or pharmacist before using any other medicine, including over-the-counter medicines, vitamins, and herbal products. · Make sure your doctor knows if you are also using blood thinners (such as clopidogrel, warfarin, or Coumadin®). Tell your doctor if you are also using dipyridamole (Persantine®), ketorolac (Toradol®), or sulfinpyrazone (Anturane®). · Make sure your doctor knows if you are using pain or arthritis medicine (such as aspirin, Advil®, Aleve®, Motrin®, Orudis®, Dolobid®, West eligio, Indocin®, Relafen®, or Voltaren®). Avoid taking aspirin or medicines that contain aspirin, unless your doctor tells you to. Warnings While Using This Medicine: · Make sure your doctor knows if you are pregnant or breastfeeding, or if you have liver disease, kidney disease, blood vessel problems, diabetes, a heart infection, uncontrolled high blood pressure, a stomach ulcer or bleeding, or a bleeding disorder such as hemophilia. Tell your doctor if you have a bleeding disorder caused by heparin. · Make sure your doctor knows if you have recently had a stroke, or surgery on your eyes, brain, or spine. Tell your doctor if you have had a heart valve replaced. · This medicine may cause bleeding or bruising. This risk is higher if you have a catheter in your back for pain medicine or anesthesia (sometimes called an \"epidural\"), or if you have kidney problems. The risk of bleeding increases if your kidney problems get worse. Discuss this with your doctor if you are concerned. · You may bleed and bruise more easily while you are using this medicine. Be extra careful to avoid injuries until the effects of the medicine have worn off. Stay away from rough sports or other situations where you could be bruised, cut, or injured. Brush and floss your teeth gently.  Be careful when using sharp objects, including razors and fingernail clippers. Avoid picking your nose. If you need to blow your nose, blow it gently. · Watch for any bleeding from open areas such as around the injection site. Also check for blood in your urine or stool. If you have any bleeding or injuries, tell your doctor right away. · Tell any doctor or dentist who treats you that you are using this medicine. You may need to stop using this medicine several days before you have surgery or medical tests. · Your doctor will do lab tests at regular visits to check on the effects of this medicine. Keep all appointments. Possible Side Effects While Using This Medicine:  
Call your doctor right away if you notice any of these side effects: · Allergic reaction: Itching or hives, swelling in your face or hands, swelling or tingling in your mouth or throat, chest tightness, trouble breathing · Blood in your urine. · Bloody or black, tarry stools. · Chest pain, shortness of breath, or coughing up blood. · Fever. · Large, flat, blue or purplish patches in the skin. · Numbness or weakness in your arm or leg, or on one side of your body. · Pain in your lower leg (calf). · Sudden or severe headache, problems with vision, speech, or walking. · Swelling in your hands, ankles, or feet. · Uneven heartbeat. · Unusual bleeding or bruising. · Vomiting blood or material that looks like coffee grounds. · Warmth or redness in your face, neck, arms, or upper chest. 
If you notice these less serious side effects, talk with your doctor: · Confusion. · Nausea or diarrhea. · Pain, redness, bruising, swelling, or a lump under your skin where the shot was given. If you notice other side effects that you think are caused by this medicine, tell your doctor. Call your doctor for medical advice about side effects. You may report side effects to FDA at 1-971-SUP-7644 © 2017 [address] Information is for End User's use only and may not be sold, redistributed or otherwise used for commercial purposes. The above information is an  only. It is not intended as medical advice for individual conditions or treatments. Talk to your doctor, nurse or pharmacist before following any medical regimen to see if it is safe and effective for you. Enoxaparin (Lovenox): Care Instructions Your Care Instructions Enoxaparin (Lovenox) is an anticoagulant medicine. It is one of a class of anticoagulants called low molecular weight heparin. Many people call these medicines blood thinners. They don't actually thin the blood, but they increase the time it takes a blood clot to form. This reduces the chance of a blood clot in the leg veins (deep vein thrombosis) or in the lungs (pulmonary embolism). Enoxaparin is a shot (injection). You or someone caring for you will inject it once or twice a day. Most people need shots for 5 to 10 days, but in some cases it can be longer. Your doctor will tell you how long you need to have the shots. Enoxaparin is used to: · Treat deep vein thrombosis (DVT), which is a blood clot in the legs, pelvis, or arms. · Reduce the chance of getting blood clots after certain surgeries. For example, you may take enoxaparin after knee or hip replacement surgery. · Reduce the chance of getting blood clots in people who are likely to get them and who are not active for a long period of time. For example, you may need enoxaparin if you need to stay in bed for a long time because of a health problem. · Reduce the chance of blood clots when another blood thinner is stopped for a short time. For example, if you take warfarin and need surgery, your doctor may ask you to stop taking warfarin for a short time before the surgery.  You will take enoxaparin to help prevent blood clots before the surgery. After the surgery, your doctor will tell you when it is safe to start taking warfarin again. This is called bridge therapy. Follow-up care is a key part of your treatment and safety. Be sure to make and go to all appointments, and call your doctor if you are having problems. It's also a good idea to know your test results and keep a list of the medicines you take. How can you care for yourself at home? How to inject enoxaparin You will get a prescription for prefilled syringes. Inject the medicine at the same time every day unless your doctor gives you other instructions. 1. Wash and dry your hands. 2. Sit or lie in a position that lets you see your belly. 3. Clean the injection site with an alcohol pad or swab, and let it dry. Choose a site on the right or left side of your belly, at least 2 inches from your belly button. Change the site each time you inject the medicine. 4. Remove the needle cap by pulling it straight off. Don't twist it. 5. Hold the syringe like a pencil in one hand. With the other hand, pinch an area of the injection site skin. You should have a \"fold\" in the skin. 6. Insert the entire needle straight down into the fold of skin. Don't insert the needle at an angle. 7. Press the plunger with your thumb until the syringe is empty. 8. Pull the needle straight out and let go of the skin. 9. Point the needle away from you and press down on the plunger. The needle will be covered. Take precautions · Don't rub the injection site. This could cause bruising. · Don't push air bubbles out of the syringe unless your doctor tells you to. Each syringe comes with air bubbles. · Don't stop taking enoxaparin without talking to your doctor. · If you are taking a blood thinner, be sure you get instructions about how to take your medicine safely. Blood thinners can cause serious bleeding problems.  
· Talk to your doctor before you take any prescription medicines, over-the-counter medicines, antibiotics, vitamins, or herbal products. · Don't take the following medicines unless your doctor says it's okay: ¨ Aspirin, products like aspirin (salicylates), or products that contain aspirin ¨ Nonsteroidal anti-inflammatory drugs (NSAIDs), such as ibuprofen (Advil, Motrin) and naproxen (Aleve) · Store enoxaparin at room temperature. Don't put it in the refrigerator or freezer. When should you call for help? Call 911 anytime you think you may need emergency care. For example, call if: 
? · You passed out (lost consciousness). ? · You have signs of severe bleeding, such as: ¨ A severe headache that is different from past headaches. ¨ Vomiting blood or what looks like coffee grounds. ¨ Passing maroon or very bloody stools. ?Call your doctor now or seek immediate medical care if: 
? · You have unexpected bleeding, including: ¨ Blood in stools or black stools that look like tar. ¨ Blood in your urine. ¨ Bruises or blood spots under the skin. ? · You feel dizzy or lightheaded. ? Watch closely for changes in your health, and be sure to contact your doctor if: 
? · You do not get better as expected. Where can you learn more? Go to http://july-bella.info/. Enter P266 in the search box to learn more about \"Enoxaparin (Lovenox): Care Instructions. \" Current as of: September 21, 2016 Content Version: 11.4 © 2492-4634 [employer]. Care instructions adapted under license by [employer] (which disclaims liability or warranty for this information). If you have questions about a medical condition or this instruction, always ask your healthcare professional. [place] [address] any warranty or liability for your use of this information. Naloxegol (By mouth) Naloxegol (jmu-KE-aj-gol) Treats constipation caused by narcotic pain medicine. Brand Name(s): Movantik There may be other brand names for this medicine. When This Medicine Should Not Be Used: This medicine is not right for everyone. Do not use it if you had an allergic reaction to naloxegol, or if you have a history of bowel blockage. How to Use This Medicine:  
Tablet · Take your medicine as directed. Your dose may need to be changed several times to find what works best for you. Stop taking this medicine if you stop taking narcotic pain medicines. · Take this medicine on an empty stomach at least 1 hour before the first meal of the day or 2 hours after a meal. 
· Swallow the tablet whole. Do not crush, break, or chew it. · If you have trouble swallowing the tablet: ¨ Mix with water: Crush the tablet and mix it with 120 mL of water and drink it right away. Refill the glass with water, stir, and drink right away to make sure to get full dose of this medicine. ¨ Given through a nasogastric tube: Draw up 30 mL of water into a 60 mL syringe and flush the nasogastric tube. Crush the tablet to a powder and mix it with 60 mL of water in a container. Draw up the mixture using 60 mL syringe and give it through the tube. Add 60 mL of water to the container mixture and use the same 60 mL syringe to flush the tube for any remaining medicine. · This medicine should come with a Medication Guide. Ask your pharmacist for a copy if you do not have one. · Missed dose: Take a dose as soon as you remember. If it is almost time for your next dose, wait until then and take a regular dose. Do not take extra medicine to make up for a missed dose. · Store the medicine in a closed container at room temperature, away from heat, moisture, and direct light. Drugs and Foods to Avoid: Ask your doctor or pharmacist before using any other medicine, including over-the-counter medicines, vitamins, and herbal products. · Do not use this medicine together with clarithromycin, itraconazole, or ketoconazole. · Some foods and medicines can affect how naloxegol works. Tell your doctor if you are using carbamazepine, diltiazem, erythromycin, methadone, rifampin, Chelsey's wort, or verapamil. · Stop taking other laxatives before you start treatment with this medicine. · Do not eat grapefruit or drink grapefruit juice while you are using this medicine. Warnings While Using This Medicine: · Tell your doctor if you are pregnant or breastfeeding, or if you have kidney disease, liver disease, or stomach or bowel problems (including Crohn disease, Princeton syndrome, diverticulitis, or stomach or bowel cancer). · This medicine may cause a tear in your stomach or bowels. · Your doctor will check your progress and the effects of this medicine at regular visits. Keep all appointments. · Keep all medicine out of the reach of children. Never share your medicine with anyone. Possible Side Effects While Using This Medicine:  
Call your doctor right away if you notice any of these side effects: · Allergic reaction: Itching or hives, swelling in your face or hands, swelling or tingling in your mouth or throat, chest tightness, trouble breathing · Chills, diarrhea, sweating, stomach pain, anxiety, irritability, yawning · Severe stomach pain and diarrhea If you notice other side effects that you think are caused by this medicine, tell your doctor. Call your doctor for medical advice about side effects. You may report side effects to FDA at 6-311-FDA-2144 © 2017 [address] Information is for End User's use only and may not be sold, redistributed or otherwise used for commercial purposes. The above information is an  only. It is not intended as medical advice for individual conditions or treatments. Talk to your doctor, nurse or pharmacist before following any medical regimen to see if it is safe and effective for you. Oxycodone, Rapid Release (By mouth) Oxycodone Hydrochloride (fl-h-MJZ-done petrona-droe-KLOR-jose guadalupe) Treats moderate to severe pain. This medicine is a narcotic pain reliever. Brand Name(s): Oxaydo, Oxy IR, Roxicodone There may be other brand names for this medicine. When This Medicine Should Not Be Used: This medicine is not right for everyone. Do not use it if you had an allergic reaction to oxycodone, codeine, hydrocodone, dihydrocodeine, or morphine, or you have a stomach or bowel blockage. How to Use This Medicine:  
Capsule, Liquid, Tablet · Take your medicine as directed. Your dose may need to be changed several times to find what works best for you. · An overdose can be dangerous. Follow directions carefully so you do not get too much medicine at one time. · Oral liquid: Measure the oral liquid medicine with a marked measuring spoon, oral syringe, or medicine cup. · Oxaydo® tablet: Swallow it whole with enough water to swallow it completely. Do not break, crush, chew, or dissolve it. Do not wet the tablet before you put it in your mouth. · This medicine should come with a Medication Guide. Ask your pharmacist for a copy if you do not have one. · Missed dose: Take a dose as soon as you remember. If it is almost time for your next dose, wait until then and take a regular dose. Do not take extra medicine to make up for a missed dose. · Store the medicine in a closed container at room temperature, away from heat, moisture, and direct light. Store the medicine in a secure place to prevent others from getting it. Ask your pharmacist about the best way to dispose of medicine you do not use. Drugs and Foods to Avoid: Ask your doctor or pharmacist before using any other medicine, including over-the-counter medicines, vitamins, and herbal products. · Do not use this medicine if you are using or have used an MAO inhibitor within the past 14 days. · Some medicines can affect how oxycodone works.  Tell your doctor if you are using any of the following: ¨ Amiodarone, carbamazepine, erythromycin, ketoconazole, phenytoin, quinidine, rifampin, ritonavir ¨ Diuretic (water pill) ¨ Medicine to treat depression or anxiety ¨ Medicine to treat migraine headaches ¨ Phenothiazine medicine · Tell your doctor if you use anything else that makes you sleepy. Some examples are allergy medicine, narcotic pain medicine, and alcohol. Tell your doctor if you are using buprenorphine, butorphanol, nalbuphine, pentazocine, or a muscle relaxer. · Do not drink alcohol while you are using this medicine. Warnings While Using This Medicine: · Tell your doctor if you are pregnant or breastfeeding, or if you have kidney disease, liver disease, heart disease, low blood pressure, lung disease or breathing problems (such as asthma, COPD), scoliosis, an enlarged prostate or trouble urinating, an underactive thyroid, Candor disease, gallbladder or pancreas problems, or digestion problems. Tell your doctor if you have a history of head injury, brain tumor, mental health problems, seizures, or alcohol or drug addiction. · This medicine may cause the following problems: 
¨ High risk of overdose, which can lead to death ¨ Respiratory depression (serious breathing problem that can be life-threatening) ¨ Serotonin syndrome, when used with certain medicines · This medicine may make you dizzy, drowsy, or faint. Do not drive or do anything else that could be dangerous until you know how this medicine affects you. Sit or lie down if you feel dizzy. Stand up carefully. · This medicine can be habit-forming. Do not use more than your prescribed dose. Call your doctor if you think your medicine is not working. · Do not stop using this medicine suddenly. Your doctor will need to slowly decrease your dose before you stop it completely. · This medicine may cause constipation, especially with long-term use.  Ask your doctor if you should use a laxative to prevent and treat constipation. Drink plenty of liquids to help avoid constipation. · This medicine could cause infertility. Talk with your doctor before using this medicine if you plan to have children. · Keep all medicine out of the reach of children. Never share your medicine with anyone. Possible Side Effects While Using This Medicine:  
Call your doctor right away if you notice any of these side effects: · Allergic reaction: Itching or hives, swelling in your face or hands, swelling or tingling in your mouth or throat, chest tightness, trouble breathing · Anxiety, restlessness, fast heartbeat, fever, sweating, muscle spasms, twitching, nausea, vomiting, diarrhea, seeing or hearing things that are not there · Blue lips, fingernails, or skin, trouble breathing · Extreme dizziness or weakness, shallow breathing, slow heartbeat, sweating, cold or clammy skin, seizures · Lightheadedness, dizziness, fainting · Severe constipation, stomach pain If you notice these less serious side effects, talk with your doctor: · Mild constipation · Sleepiness, tiredness If you notice other side effects that you think are caused by this medicine, tell your doctor. Call your doctor for medical advice about side effects. You may report side effects to FDA at 4-761-FDA-0612 © 2017 [facility] Information is for End User's use only and may not be sold, redistributed or otherwise used for commercial purposes. The above information is an  only. It is not intended as medical advice for individual conditions or treatments. Talk to your doctor, nurse or pharmacist before following any medical regimen to see if it is safe and effective for you. Please provide this summary of care documentation to your next provider.  
  
  
 
  
Signatures-by signing, you are acknowledging that this After Visit Summary has been reviewed with you and you have received a copy. Patient Signature:  ____________________________________________________________ Date:  ____________________________________________________________  
  
[facility] Provider Signature:  ____________________________________________________________ Date:  ____________________________________________________________

## 2018-01-23 NOTE — PROGRESS NOTES
New patient arrived from PACU in stable condition. Pt is AOx4 and easily responses to questions. Vitals w/in normal limits. No signs of distress w/ family at bedside. Lung sounds diminished, but clear w/ bilaterally equal chest expansion. Bowel sounds present w/ no tenderness or pain w/ palpitating. Peripheral pulses present w/ cap refill of < 3 sec, warm to touch, sensation present. Ice pack placed on affected hip. Pt provided education call light and bed lowest position.

## 2018-01-23 NOTE — PROGRESS NOTES
Problem: Mobility Impaired (Adult and Pediatric)  Goal: *Acute Goals and Plan of Care (Insert Text)  STG's to be addressed within 3 days:  1. Bed mobility:  Supine to sit to supine S with HR for meals. 2. Activity tolerance: Tolerate up in chair 1-2 hrs for ADLs. 3. Transfers:  Sit to stand to chair S with LRAD for ADL's. LTG's to be addressed within 7 days:  1. Standing/Ambulation Balance:  Increase to Good with LRAD for safe transfers and gait. 2. Ambulation:  Ambulate > 200 ft. S with LRAD for home mobility. 3. Patient Education:  Independent with HEP for home safety. 4. Stairs:  Up/Down 2 steps CGA without HR for home entry. Outcome: Progressing Towards Goal  physical Therapy EVALUATION    Patient: Lorrie Xavier (60 y.o. male)  Date: 1/23/2018  Primary Diagnosis: AVASCULAR NECROSIS RT HIP M87.051; OA RIGHT HIP M16.11; RIGHT HIP PAIN M25.551  Osteoarthritis of right hip  Osteoarthritis of right hip  Procedure(s) (LRB):  RIGHT TOTAL HIP ARTHROPLASTY/BIOMET/2 SA'S (Right) Day of Surgery   Precautions: Fall, WBAT (RLE)    ASSESSMENT :  Based on the objective data described below, the patient presents with impaired functional mobility including bed mobility, transfers, ambulation, and general activity tolerance s/p R JESSICA. Patient presents today semi-reclined in bed with constavac to RLE, patient alert and agreeable to PT evaluation. Patient educated on hip precautions with verbal understanding noted. He required Jossy to transfer to sitting EOB while maintaining hip precautions, required Jossy for rise to standing d/t impulsive initiation of transfer. He then ambulated ~90 ft in hallway with RW and CGA, required intermittent cuing for technique and safety as patient continues to demonstrate impulsive movements. He was assisted to locked recliner, left with SCDs in place, LE elevated, and ice to R hip, nurse in room.     Patient will benefit from skilled intervention to address the above impairments. Patients rehabilitation potential is considered to be Good  Factors which may influence rehabilitation potential include:   []         None noted  []         Mental ability/status  []         Medical condition  []         Home/family situation and support systems  []         Safety awareness  []         Pain tolerance/management  []         Other:      PLAN :  Recommendations and Planned Interventions:  [x]           Bed Mobility Training             []    Neuromuscular Re-Education  [x]           Transfer Training                   []    Orthotic/Prosthetic Training  [x]           Gait Training                          []    Modalities  [x]           Therapeutic Exercises          []    Edema Management/Control  [x]           Therapeutic Activities            [x]    Patient and Family Training/Education  []           Other (comment):    Frequency/Duration: Patient will be followed by physical therapy 1-2 times per day to address goals. Discharge Recommendations: Home Health  Further Equipment Recommendations for Discharge: rolling walker     SUBJECTIVE:   Patient stated I'm getting out of here as soon as possible.     OBJECTIVE DATA SUMMARY:     Past Medical History:   Diagnosis Date    HIV (human immunodeficiency virus infection) ([facility] [address]     Liver disease     Hepatitis C (treated)     Past Surgical History:   Procedure Laterality Date    NEUROLOGICAL PROCEDURE UNLISTED      neck surgery     Barriers to Learning/Limitations: None  Compensate with: N/A  Prior Level of Function/Home Situation: Patient lives with wife in 1 story home. He was ambulating independently PTA, however reports he was in significant pain.   Home Situation  Home Environment: Private residence  # Steps to Enter: 2  Rails to Enter: No  One/Two Story Residence: One story  Living Alone: No  Support Systems: Spouse/Significant Other/Partner  Patient Expects to be Discharged to[de-identified] Private residence  Current DME Used/Available at Home: Cane, straight  Critical Behavior:  Neurologic State: Alert  Psychosocial  Patient Behaviors: Calm; Cooperative  Strength:    Strength: Generally decreased, functional (BLE)  Tone & Sensation:   Tone: Normal  Sensation: Intact (BLE)   Range Of Motion:  AROM: Generally decreased, functional (RLE)  Functional Mobility:  Bed Mobility:  Supine to Sit: Minimum assistance (RLE management)  Scooting: Contact guard assistance  Transfers:  Sit to Stand: Minimum assistance  Stand to Sit: Contact guard assistance  Balance:   Sitting: Intact  Standing: Impaired; With support (RW)  Standing - Static: Good  Standing - Dynamic : Fair  Ambulation/Gait Training:  Distance (ft): 90 Feet (ft)  Assistive Device: Walker, rolling  Ambulation - Level of Assistance: Contact guard assistance  Base of Support: Shift to left; Widened  Speed/Mae: Pace decreased (<100 feet/min)  Pain:  Pain before treatment: 0/10  Pain post treatment: 0/10  Activity Tolerance:   Good  Please refer to the flowsheet for vital signs taken during this treatment. After treatment:   [x] Patient left in no apparent distress sitting up in chair  [] Patient left sitting on EOB  [] Patient left in no apparent distress in bed  [] Patient declined to be OOB at this time due to  [x] Call bell left within reach  [x] Nursing notified(Crystal)  [x] Caregiver present  [] Bed alarm activated  [x] SCDs in place    COMMUNICATION/EDUCATION:   [x]         Fall prevention education was provided and the patient/caregiver indicated understanding. [x]         Patient/family have participated as able in goal setting and plan of care. [x]         Patient/family agree to work toward stated goals and plan of care. []         Patient understands intent and goals of therapy, but is neutral about his/her participation. []         Patient is unable to participate in goal setting and plan of care.     Thank you for this referral.  Liana Howe   Time Calculation: 23 mins      Mobility [id number] Current  CJ= 20-39%   Goal  CI= 1-19%. The severity rating is based on the Level of Assistance required for Functional Mobility and ADLs.     Eval Complexity: History: MEDIUM  Complexity : 1-2 comorbidities / personal factors will impact the outcome/ POC Exam:MEDIUM Complexity : 3 Standardized tests and measures addressing body structure, function, activity limitation and / or participation in recreation  Presentation: MEDIUM Complexity : Evolving with changing characteristics  Overall Complexity:MEDIUM

## 2018-01-23 NOTE — ANESTHESIA POSTPROCEDURE EVALUATION
Post-Anesthesia Evaluation and Assessment    Patient: Leda Smoker MRN: [id number]  SSN: [id number]    YOB: 1955  Age: 63 y.o. Sex: male       Cardiovascular Function/Vital Signs  Visit Vitals    /83    Pulse 66    Temp 36.3 °C (97.4 °F)    Resp 15    Ht 5' 10\" (1.778 m)    Wt 84.4 kg (186 lb)    SpO2 99%    BMI 26.69 kg/m2       Patient is status post general anesthesia for Procedure(s):  RIGHT TOTAL HIP ARTHROPLASTY/BIOMET/2 SA'S. Nausea/Vomiting: None    Postoperative hydration reviewed and adequate. Pain:  Pain Scale 1: Numeric (0 - 10) (01/23/18 1103)  Pain Intensity 1: 0 (01/23/18 1103)   Managed    Neurological Status:   Neuro (WDL): Within Defined Limits (01/23/18 1024)   At baseline    Mental Status and Level of Consciousness: Arousable    Pulmonary Status:   O2 Device: Room air (01/23/18 1054)   Adequate oxygenation and airway patent    Complications related to anesthesia: None    Post-anesthesia assessment completed.  No concerns    Signed By: Alexander Rivas MD     January 23, 2018

## 2018-01-23 NOTE — ANESTHESIA PROCEDURE NOTES
Peripheral Block    Start time: 1/23/2018 7:10 AM  End time: 1/23/2018 7:18 AM  Performed by: Megan Galeano  Authorized by: Megan Galeano       Pre-procedure: Indications: at surgeon's request, post-op pain management and procedure for pain    Preanesthetic Checklist: patient identified, risks and benefits discussed, site marked, timeout performed, anesthesia consent given and patient being monitored      Block Type:   Block Type:  Lumbar plexus  Laterality:  Right  Monitoring:  Standard ASA monitoring, heart rate, continuous pulse ox, frequent vital sign checks, oxygen and responsive to questions  Injection Technique:  Single shot  Procedures: nerve stimulator    Prep: chlorhexidine    Location:  Sacral area  Needle Type:  Stimuplex  Needle Gauge:  21 G  Needle Localization:  Anatomical landmarks and nerve stimulator  Medication Injected:  0.5%  ropivacaine  Volume (mL):  30    Assessment:  Number of attempts:  1  Injection Assessment:  Incremental injection every 5 mL, negative aspiration for blood, no intravascular symptoms, no paresthesia and negative aspiration for CSF  Patient tolerance:  Patient tolerated the procedure well with no immediate complications  Location:  PREOP HOLDING    Patient given 2 mg IV Versed and 100 mcg IV Fentanyl for sedation.     1/23/2018     7:24 AM     Henri Cortez MD

## 2018-01-23 NOTE — INTERVAL H&P NOTE
H&P Update:  Trevor Connelly was seen and examined. History and physical has been reviewed. The patient has been examined. There have been no significant clinical changes since the completion of the originally dated History and Physical.  Patient identified by surgeon; surgical site was confirmed by patient and surgeon.     Signed By: Jose Kirk MD     January 23, 2018 7:11 AM

## 2018-01-23 NOTE — PROGRESS NOTES
conducted a pre-surgery visit with Juliano Harris, who is a 63 y.o.,male. The  provided the following Interventions:  Initiated a relationship of care and support. Plan:  Chaplains will continue to follow and will provide pastoral care on an as needed/requested basis.  recommends bedside caregivers page  on duty if patient shows signs of acute spiritual or emotional distress.     [address] [facility]   Board Certified [address] [place] [address]   [phone]

## 2018-01-23 NOTE — OP NOTES
Operative Note      Patient: Nunu Fonseca     Date of Surgery: 1/23/2018         YOB: 1955      Age:  63 y.o.        LOS:  LOS: 0 days       Preoperative Diagnosis:  AVASCULAR NECROSIS RT HIP M87.051; OA RIGHT HIP M16.11; RIGHT HIP PAIN M25.551    Postoperative Diagnosis: AVASCULAR NECROSIS RT HIP M87.051; OA RIGHT HIP M16.11; RIGHT HIP PAIN M25.551      Surgeon:  Peewee Drake MD    Anesthesia:  general anesthesia and lumbar plexus block    Procedure:  Procedure(s):  RIGHT TOTAL HIP ARTHROPLASTY/BIOMET/2 SA'S    Time out performed: YES    Estimated Blood Loss:  200 cc           Implants:    Implant Name Type Inv. Item Serial No.  Lot No. LRB No. Used Action   SCR ACET ST LO PROF 6.5X15MM --  - [id number]  SCR ACET ST LO PROF 6.5X15MM --   [employer] [id number] Right 1 Implanted   PLUG APCL HOLE --  - [id number]  PLUG APCL HOLE --   [employer] [id number] Right 1 Implanted   SHELL ACET LTD RINGLOK 54MM --  - [id number]  SHELL ACET LTD RINGLOK 54MM --   [employer] [id number] Right 1 Implanted   LINER RNGLC +3 MROM SZ 24 -- 40MM - [id number]  LINER RNGLC +3 MROM SZ 24 -- 40MM  [employer] [id number] Right 1 Implanted   STEM TPRLC DSTL PPS SO 11MM --  - [id number]  STEM TPRLC DSTL PPS SO 11MM --   [employer] [id number] Right 1 Implanted   HEAD CER DELTA OP BIOLOX 40 --  - [id number]  HEAD CER DELTA OP BIOLOX 40 --   [employer] [id number] Right 1 Implanted   SLEEVE TAPE ADPT OP BILOX NEG6 --  - [id number]   SLEEVE TAPE ADPT OP BILOX NEG6 --    [employer] [id number] Right 1 Implanted       Specimens: * No specimens in log *            Complications:  None      DESCRIPTION OF PROCEDURE: After satisfactory general and lumbar plexus block anesthesia, in the supine position, patient's hip was prepped with Betacept and ChloroPrep solution and draped in the usual fashion for hip replacement.  An anterior hip incision was made and carried down through the subcutaneous tissue to the underlying fascia. The tensor fascia was retracted laterally and the anterior hip capsule was exposed. An anterior hip capsulectomy was performed exposing the arthritic hip joint. The femoral neck was osteotomized with an oscillating saw. The femoral head was removed with a corkscrew. The acetabulum was exposed with further capsulectomy and four quadrant retraction. The acetabulum was serially reamed to 1 mm less than the actual socket size of 54 mm. Once the acetabulum was fully prepared, the acetabular component was impacted securely into place in 15 degrees anteversion and 45 degrees abduction. A supplemental 15 mm screw was inserted in the acetabular dome area for additional fixation. The E poly liner was then impacted into place with good stability noted. Patient's leg was then brought around to figure 4 position after first dropping the opposite leg slightly on the split leg table. The proximal femur was serially broached with the taper lock broach to the appropriate implant size of 11 mm. Good broach stability was noted. A trial reduction was carried out. Good stability and equal leg lengths were noted. The hip was then dislocated and the trial components were removed. The wound was irrigated thoroughly with a Constavac lavage. The actual femoral component was then impacted firmly into place. The 40 mm ceramic head component was impacted firmly into place on the clean Nicholson taper of the standard offset femoral component. The hip was reduced and was found to be stable with equal leg lengths. The wound was again irrigated thoroughly, including a dilute Betadine lavage. A Constavac drain was inserted. Closure was obtained using #2 vicryl sutures for the fascia, 2-0 vicryl for the subcutaneous tissues, and staples for the skin. Sterile gauze dressings were applied and Mr. Bella Foster was transported to the recovery room in good condition. Sponge and needle count was correct.

## 2018-01-23 NOTE — PERIOP NOTES
TRANSFER - OUT REPORT:    Verbal report given to [address] [place] [address] on [employer]  being transferred to [address] [place] [address] for routine post - op       Report consisted of patients Situation, Background, Assessment and   Recommendations(SBAR). Information from the following report(s) SBAR, OR Summary and MAR was reviewed with the receiving nurse. Lines:   Peripheral IV 01/23/18 Right Hand (Active)   Site Assessment Clean, dry, & intact 1/23/2018 10:24 AM   Phlebitis Assessment 0 1/23/2018 10:24 AM   Infiltration Assessment 0 1/23/2018 10:24 AM   Dressing Status Clean, dry, & intact 1/23/2018 10:24 AM   Dressing Type Tape;Transparent 1/23/2018 10:24 AM   Hub Color/Line Status Pink; Infusing 1/23/2018 10:24 AM        Opportunity for questions and clarification was provided.       Patient transported with:   [employer]

## 2018-01-23 NOTE — BRIEF OP NOTE
BRIEF OPERATIVE NOTE    Date of Procedure: 1/23/2018   Preoperative Diagnosis: AVASCULAR NECROSIS RT HIP M87.051; OA RIGHT HIP M16.11; RIGHT HIP PAIN M25.551  Postoperative Diagnosis: AVASCULAR NECROSIS RT HIP M87.051; OA RIGHT HIP M16.11; RIGHT HIP PAIN M25.551    Procedure(s):  RIGHT TOTAL HIP ARTHROPLASTY/BIOMET/2 SA'S  Surgeon(s) and Role:     * Jagdish Carver MD - Primary         Assistant Staff:       Surgical Staff:  Circ-1: Jade Gosselin, RN  Circ-2: Olivia Kuhn RN  Scrub Tech-1: Luis Alfredo Pulido  Surg Asst-1: Lorena Le  Surg Asst-2: Doyle Bruno  Event Time In   Incision Start 1049   Incision Close 1014     Anesthesia: General   Estimated Blood Loss: 200 cc  Specimens: * No specimens in log *   Findings: above   Complications: none  Implants:   Implant Name Type Inv.  Item Serial No.  Lot No. LRB No. Used Action   SCR ACET ST LO PROF 6.5X15MM --  - [id number]  SCR ACET ST LO PROF 6.5X15MM --   [employer] [id number] Right 1 Implanted   PLUG APCL HOLE --  - [id number]  PLUG APCL HOLE --   [employer] [id number] Right 1 Implanted   SHELL ACET LTD RINGLOK 54MM --  - [id number]  SHELL ACET LTD RINGLOK 54MM --   [employer] [id number] Right 1 Implanted   LINER RNGLC +3 MROM SZ 24 -- 40MM - [id number]  LINER RNGLC +3 MROM SZ 24 -- 40MM  [employer] [id number] Right 1 Implanted   STEM TPRLC DSTL PPS SO 11MM --  - [id number]  STEM TPRLC DSTL PPS SO 11MM --   [employer] [id number] Right 1 Implanted   HEAD CER DELTA OP BIOLOX 40 --  - [id number]  HEAD CER DELTA OP BIOLOX 40 --   [employer] [id number] Right 1 Implanted   SLEEVE TAPE ADPT OP BILOX NEG6 --  - [id number]   SLEEVE TAPE ADPT OP BILOX NEG6 --    [employer] [id number] Right 1 Implanted

## 2018-01-24 ENCOUNTER — HOME HEALTH ADMISSION (OUTPATIENT)
Dept: HOME HEALTH SERVICES | Facility: HOME HEALTH | Age: 63
End: 2018-01-24
Payer: COMMERCIAL

## 2018-01-24 VITALS
HEART RATE: 105 BPM | HEIGHT: 70 IN | SYSTOLIC BLOOD PRESSURE: 95 MMHG | OXYGEN SATURATION: 99 % | TEMPERATURE: 98.6 F | RESPIRATION RATE: 18 BRPM | DIASTOLIC BLOOD PRESSURE: 55 MMHG | BODY MASS INDEX: 26.63 KG/M2 | WEIGHT: 186 LBS

## 2018-01-24 PROCEDURE — 97165 OT EVAL LOW COMPLEX 30 MIN: CPT

## 2018-01-24 PROCEDURE — 74011250636 HC RX REV CODE- 250/636: Performed by: SPECIALIST

## 2018-01-24 PROCEDURE — 97116 GAIT TRAINING THERAPY: CPT

## 2018-01-24 PROCEDURE — 97535 SELF CARE MNGMENT TRAINING: CPT

## 2018-01-24 PROCEDURE — 74011250637 HC RX REV CODE- 250/637: Performed by: SPECIALIST

## 2018-01-24 RX ORDER — ENOXAPARIN SODIUM 100 MG/ML
30 INJECTION SUBCUTANEOUS EVERY 24 HOURS
Qty: 4.2 ML | Refills: 0 | Status: SHIPPED | OUTPATIENT
Start: 2018-01-25 | End: 2018-02-08

## 2018-01-24 RX ORDER — OXYCODONE HYDROCHLORIDE 5 MG/1
5-15 TABLET ORAL
Status: DISCONTINUED | OUTPATIENT
Start: 2018-01-24 | End: 2018-01-24 | Stop reason: HOSPADM

## 2018-01-24 RX ORDER — OXYCODONE HYDROCHLORIDE 5 MG/1
5-15 TABLET ORAL
Qty: 28 TAB | Refills: 0 | Status: SHIPPED | OUTPATIENT
Start: 2018-01-24 | End: 2018-02-02 | Stop reason: SDUPTHER

## 2018-01-24 RX ADMIN — ACETAMINOPHEN 650 MG: 325 TABLET ORAL at 06:46

## 2018-01-24 RX ADMIN — Medication 10 ML: at 05:46

## 2018-01-24 RX ADMIN — TIZANIDINE 4 MG: 4 TABLET ORAL at 08:15

## 2018-01-24 RX ADMIN — ENOXAPARIN SODIUM 30 MG: 30 INJECTION SUBCUTANEOUS at 08:16

## 2018-01-24 RX ADMIN — CEFAZOLIN SODIUM 2 G: 2 SOLUTION INTRAVENOUS at 01:53

## 2018-01-24 RX ADMIN — DOCUSATE SODIUM 100 MG: 100 CAPSULE, LIQUID FILLED ORAL at 08:16

## 2018-01-24 NOTE — PROGRESS NOTES
Care Management Interventions  PCP Verified by CM: Yes (Dr. Estella Mtz)  Mode of Transport at Discharge: Other (see comment) (spouse)  Transition of Care Consult (CM Consult): Home Health, Discharge [address]: Yes  Discharge Durable Medical Equipment: Yes (RW upon d/c)  Physical Therapy Consult: Yes  Occupational Therapy Consult: Yes  Current Support Network: Lives with Spouse, Family Lives [place], Own Home (Pt lives with his wife, states his brother lives around the corner should he need additional assitance)  Confirm Follow Up Transport: Family  Plan discussed with Pt/Family/Caregiver: Yes  Freedom of Choice Offered: Yes  Discharge Location  Discharge Placement: Home with home health    Patient is a 62 yo male admitted for a hip replacement. Patient sitting up in chair at this time, alert and oriented. Was ambulating the halls earlier. He is eager to discharge home and wants to leave today. Prior to admission the patient was independent and did not require any DME. Wife will drive him to appointments until he is able to drive again. He will require a RW upon discharge and signed [address] for [facility]. Will need home health orders written for this patient. 1330: Order obtained for [facility] AT [facility] and referral sent to [facility] via [address]. VML with liaisons. Patricia Martin with [facility] contacted for RW.

## 2018-01-24 NOTE — PROGRESS NOTES
Rounded on patient. Did post surgical education with patient to review use of incentive spirometer while in the hospital and to continue use at home for one week using the IS at least 2-3 times every 15 minutes for 10 times and hours. Also reminded patient to keep SCD hose on while in the bed. Reinforced and did return demonstration with ankle pumping  while doing incentive spirometer. Patient given educational sheet for a reminder. Also discussed the importance of eating a well balanced diet to promote healing. Encouraged to use Ice to hip. Reviewed pain medications, bowel medications with patient. Patient encouraged not to get constipated and to call for pain medication when needed. Patient instructed to use ice also for pain control to bring down swelling. Patient encouraged to drink fluids to prevent complications. Patient given CHG wash to use in hospital and at home. Patient instructed to daily use a clean towel and put on clean clothes. Patient encouraged to use distraction and repositioning for other ways to control pain. Finally patient encouraged to get OOB for all meals and to sit in the chair during the day. Patient told to never get OOB without assistance. Patient verbalizing understanding and given opportunity to ask questions. Call Light in reach. Dressing dry and intact. Patient asked if there is anything they need. Mobility Intervention: Patient ready to take stairs and go home.         [] Pt dangled at edge of bed    [] Pt assisted OOB to bedside commode    [] Pt assisted OOB to chair    [] Pt ambulated to bathroom    [x] Patient was ambulated in room/hallway    Assistive Device Utilized:       [x] Rolling walker   [] Crutches   [] Straight Cane   [] Knee immobilizer   [] IV pole    After Mobilization:     [x] Patient left in no apparent distress sitting up in chair  [] Patient left in no apparent distress in bed  [x] Call bell left within reach  [x] SCDs on & machine turned on  [x] Ice applied  [] RN notified  [] Caregiver present  [] Bed alarm activated    Reason patient not mobilized:      [] Patient refused   [] Nausea/vomiting   [] Low blood pressure   [] Drowsy/lethargic    Pain Rating:     [x] 0  [] 1  Assistive Device:        [] 2  [] 3  [] 4  [] 5  [] 6  Assistive Device:        [] 7  [] 8  [] 9  [] 10    Comments:       Orthopedic

## 2018-01-24 NOTE — PROGRESS NOTES
Problem: Self Care Deficits Care Plan (Adult)  Goal: *Acute Goals and Plan of Care (Insert Text)  Outcome: Resolved/Met Date Met: 01/24/18  Occupational Therapy EVALUATION/discharge    Patient: Gab Faulkner (64 y.o. male)  Date: 1/24/2018  Primary Diagnosis: AVASCULAR NECROSIS RT HIP M87.051; OA RIGHT HIP M16.11; RIGHT HIP PAIN M25.551  Osteoarthritis of right hip  Osteoarthritis of right hip  Procedure(s) (LRB):  RIGHT TOTAL HIP ARTHROPLASTY/BIOMET/2 SA'S (Right) 1 Day Post-Op   Precautions:   Fall, Total hip, WBAT    ASSESSMENT AND RECOMMENDATIONS:  Based on the objective data described below, the patient is able to perform basic self care tasks without assistance using AE given (reacher, sock aid, LHS) after demonstration/practice. Supervision given for functional standing and transfers. Will defer to PT for mobility training. Hip precautions reviewed and patient verbalized understanding. Patient has a supportive wife at home to assist him prn and was educated on use of a seat in the shower. He will purchase upon discharge if needed. Skilled occupational therapy is not indicated at this time. Discharge Recommendations: None  Further Equipment Recommendations for Discharge: N/A      Barriers to Learning/Limitations: None  Compensate with: visual, verbal, tactile, kinesthetic cues/model     COMPLEXITY     Eval Complexity: History: LOW Complexity : Brief history review ; Examination: LOW Complexity : 1-3 performance deficits relating to physical, cognitive , or psychosocial skils that result in activity limitations and / or participation restrictions ; Decision Making:LOW Complexity : No comorbidities that affect functional and no verbal or physical assistance needed to complete eval tasks  Assessment: Low Complexity        G-CODES:     Self Care  Current  CI= 1-19%   Goal  CI= 1-19%   D/C  CI= 1-19%.   The severity rating is based on the Level of Assistance required for Functional Mobility and ADLs.    SUBJECTIVE:   Patient stated I'm ready to go home.     OBJECTIVE DATA SUMMARY:     Past Medical History:   Diagnosis Date    HIV (human immunodeficiency virus infection) ([address]     Liver disease     Hepatitis C (treated)     Past Surgical History:   Procedure Laterality Date    NEUROLOGICAL PROCEDURE UNLISTED      neck surgery     Prior Level of Function/Home Situation: Pt was independent with basic self care tasks and functional mobility PTA. Home Situation  Home Environment: Private residence  # Steps to Enter: 2  Rails to Enter: No  One/Two Story Residence: One story  Living Alone: No  Support Systems: Spouse/Significant Other/Partner  Patient Expects to be Discharged to[de-identified] Private residence  Current DME Used/Available at Home: Cane, straight  Tub or Shower Type: Tub/Shower combination  [x]     Right hand dominant   []     Left hand dominant  Cognitive/Behavioral Status:  Neurologic State: Alert  Orientation Level: Oriented X4  Cognition: Appropriate decision making; Follows commands  Safety/Judgement: Awareness of environment; Fall prevention    Skin: Intact on UEs    Edema: None noted in UEs    Vision/Perceptual:    Acuity: Within Defined Limits      Coordination:  Fine Motor Skills-Upper: Left Intact; Right Intact    Gross Motor Skills-Upper: Left Intact; Right Intact    Balance:  Sitting: Intact  Standing: Intact; With support  Standing - Static: Good  Standing - Dynamic : Fair    Strength:  Strength:  Within functional limits (UEs)    Tone & Sensation:  Tone: Normal (UEs)  Sensation: Intact (UEs)    Range of Motion:  AROM: Within functional limits (UEs)    Functional Mobility and Transfers for ADLs:  Bed Mobility:  Rolling: Supervision  Supine to Sit: Supervision  Sit to Supine: Supervision  Scooting: Supervision  Transfers:  Sit to Stand: Supervision   Toilet Transfer : Supervision    ADL Assessment:  Feeding: Independent    Oral Facial Hygiene/Grooming: Independent    Bathing: Supervision    Upper Body Dressing: Independent    Lower Body Dressing: Supervision    Toileting: Independent    ADL Intervention:  Patient practiced LB dressing with use of AE given (reacher, sock aid) after demonstration. No assist needed after practice. Patient also given a long handled sponge after demonstration. Cognitive Retraining  Safety/Judgement: Awareness of environment; Fall prevention    Pain:  Pt reports 5/10 pain or discomfort prior to treatment, in right hip. Pain meds given prior to session.    Pt reports 5/10 pain or discomfort post treatment, in right hip. Patient resting in chair at end of session. Activity Tolerance:   Good    Please refer to the flowsheet for vital signs taken during this treatment. After treatment:   [x]  Patient left in no apparent distress sitting up in chair  []  Patient left in no apparent distress in bed  [x]  Call bell left within reach  []  Nursing notified  []  Caregiver present  []  Bed alarm activated    COMMUNICATION/EDUCATION:   Communication/Collaboration:  [x]      Home safety education was provided and the patient/caregiver indicated understanding. [x]      Patient/family have participated as able and agree with findings and recommendations. []      Patient is unable to participate in plan of care at this time.     Brynn Devi, MS OTR/L  Time Calculation: 24 mins

## 2018-01-24 NOTE — PROGRESS NOTES
Pain score:  0/10  Name and time of last pain med given: none; refusing scheduled tylenol . \"I'm not in pain I don't need to take it\"    Neuro status: Pt is alert and orientated x4 and calmly resting in bed. No signs of acute distress. Neuromotor function intact and can follow commands. Pt reports pre-existing nerve damage in the R foot. Pupils +2 and reactive. Face symmetrical and speech clear. Dressing/incision status: Clean, dry and intact. No breakthrough drainage. Ice gel packs replaced. LYNDSEY/Hemovace output:  Constatvac output sine 1245 is 450mL o f sanguinous drainage. Pt was re-infused w/ 400 mL of drainage this evening. Voiding status:  Lerma - patent draining clear, yellow urine   Mobility status:  Up in the recliner, ambulated w/ PT in the hallway w/ rolling walker. Other: Lung sounds diminished, but clear w/ bilaterally equal chest expansion. Bowel sounds present w/ no tenderness or pain w/ palpitating. Peripheral pulses present w/ cap refill of < 3 sec, warm to touch, sensation present.

## 2018-01-24 NOTE — HOME CARE
Received HH referral, Discharge noted for today, [facility] will follow for SN,wound care,Lovenox inj,PT/OT, pt states he already has a cane, DME: RW ordered from DME rep Portillo Acuña of [facility],to be delivered to pt prior to discharge today, pt and pt's wife Ronda Sandra)  states that they have been taught how to give Lovenox inj; [facility] will follow. SHIKHA HOYT.

## 2018-01-24 NOTE — ANESTHESIA POSTPROCEDURE EVALUATION
Post-Anesthesia Evaluation and Assessment    Patient: Teri Carson MRN: [id number]  SSN: [id number]    YOB: 1955  Age: 63 y.o. Sex: male       Cardiovascular Function/Vital Signs  Visit Vitals    BP 95/55    Pulse (!) 105    Temp 37 °C (98.6 °F)    Resp 18    Ht 5' 10\" (1.778 m)    Wt 84.4 kg (186 lb)    SpO2 99%    BMI 26.69 kg/m2       Patient is status post general anesthesia for Procedure(s):  RIGHT TOTAL HIP ARTHROPLASTY/BIOMET/2 SA'S. Nausea/Vomiting: None    Postoperative hydration reviewed and adequate. Pain:  Pain Scale 1: Numeric (0 - 10) (01/24/18 0400)  Pain Intensity 1: 0 (01/24/18 0400)   Managed    Neurological Status:   Neuro (WDL): Within Defined Limits (01/23/18 1024)  Neuro  Neurologic State: Alert (01/24/18 1200)  Orientation Level: Oriented X4 (01/24/18 1200)  Cognition: Appropriate decision making; Follows commands (01/24/18 1200)  RLE Motor Response: Purposeful (01/23/18 2000)   At baseline    Mental Status and Level of Consciousness: Alert and oriented     Pulmonary Status:   O2 Device: Room air (01/23/18 2000)   Adequate oxygenation and airway patent    Complications related to anesthesia: None    Post-anesthesia assessment completed.  No concerns    Signed By: Penny Franco CRNA     January 24, 2018

## 2018-01-24 NOTE — DISCHARGE INSTRUCTIONS
Discharge Instructions for Total Hip Replacement Patients    · The dressing on your hip will be changed by the Home Health professional at the appropriate time. Keep your incision clean and dry. Do not apply any ointments to the incision. · You may shower as long as you keep you incision dry. When showering, leave your dressing on. The dressing is waterproof as long as the edges are sealed. · Notify your surgeon if:  · Your temperature is greater than 100.5  · You have pain not controlled by your pain medication  · You have increased drainage from your incision  · You have increased redness or swelling in your leg  · You have chest pain, shortness of breath, or any other problems    · Do your exercises as instructed by the home physical therapist.    · Follow your total hip precautions:  · Do not cross your legs or feet  · Do not turn your toes inward  · Do not bed at your waist more than 90 degrees    · Keep a firm pillow between your knees when sleeping or lying down. · You may use ice to your hip as needed. Do not apply the ice pack directly to your skin. Use a barrier such as your pant leg or a thin towel. · Walk once an hour during normal walking hours. · If you have SANTA hose (the white support stockings), remove them at bedtime and re-apply the hose in the morning for the next 2 weeks. Best of luck with your new hip and [address] for choosing the [address]! Patient armband removed and shredded      DISCHARGE SUMMARY from Nurse    PATIENT INSTRUCTIONS:    After general anesthesia or intravenous sedation, for 24 hours or while taking prescription Narcotics:  · Limit your activities  · Do not drive and operate hazardous machinery  · Do not make important personal or business decisions  · Do  not drink alcoholic beverages  · If you have not urinated within 8 hours after discharge, please contact your surgeon on call.     Report the following to your surgeon:  · Excessive pain, swelling, redness or odor of or around the surgical area  · Temperature over 100.5  · Nausea and vomiting lasting longer than 4 hours or if unable to take medications  · Any signs of decreased circulation or nerve impairment to extremity: change in color, persistent  numbness, tingling, coldness or increase pain  · Any questions    What to do at Home:  Recommended activity: Activity as tolerated within restrictions detailed by provider    If you experience any of the following symptoms Nausea, vomiting, diarrhea, fever greater than 100.5, dizziness, severe headache, shortness of breath, chest pain, increased pain, please follow up with PCP. *  Please give a list of your current medications to your Primary Care Provider. *  Please update this list whenever your medications are discontinued, doses are      changed, or new medications (including over-the-counter products) are added. *  Please carry medication information at all times in case of emergency situations. These are general instructions for a healthy lifestyle:    No smoking/ No tobacco products/ Avoid exposure to second hand smoke  Surgeon General's Warning:  Quitting smoking now greatly reduces serious risk to your health. Obesity, smoking, and sedentary lifestyle greatly increases your risk for illness    A healthy diet, regular physical exercise & weight monitoring are important for maintaining a healthy lifestyle    You may be retaining fluid if you have a history of heart failure or if you experience any of the following symptoms:  Weight gain of 3 pounds or more overnight or 5 pounds in a week, increased swelling in our hands or feet or shortness of breath while lying flat in bed. Please call your doctor as soon as you notice any of these symptoms; do not wait until your next office visit.     Recognize signs and symptoms of STROKE:    F-face looks uneven    A-arms unable to move or move unevenly    S-speech slurred or non-existent    T-time-call 911 as soon as signs and symptoms begin-DO NOT go       Back to bed or wait to see if you get better-TIME IS BRAIN. Warning Signs of HEART ATTACK     Call 911 if you have these symptoms:   Chest discomfort. Most heart attacks involve discomfort in the center of the chest that lasts more than a few minutes, or that goes away and comes back. It can feel like uncomfortable pressure, squeezing, fullness, or pain.  Discomfort in other areas of the upper body. Symptoms can include pain or discomfort in one or both arms, the back, neck, jaw, or stomach.  Shortness of breath with or without chest discomfort.  Other signs may include breaking out in a cold sweat, nausea, or lightheadedness. Don't wait more than five minutes to call 911 - MINUTES MATTER! Fast action can save your life. Calling 911 is almost always the fastest way to get lifesaving treatment. Emergency Medical Services staff can begin treatment when they arrive -- up to an hour sooner than if someone gets to the hospital by car. The discharge information has been reviewed with the patient. The patient verbalized understanding. Discharge medications reviewed with the patient and appropriate educational materials and side effects teaching were provided.   ___________________________________________________________________________________________________________________________________

## 2018-01-24 NOTE — PROGRESS NOTES
Problem: Mobility Impaired (Adult and Pediatric)  Goal: *Acute Goals and Plan of Care (Insert Text)  STG's to be addressed within 3 days:  1. Bed mobility:  Supine to sit to supine S with HR for meals. 2. Activity tolerance: Tolerate up in chair 1-2 hrs for ADLs. 3. Transfers:  Sit to stand to chair S with LRAD for ADL's. LTG's to be addressed within 7 days:  1. Standing/Ambulation Balance:  Increase to Good with LRAD for safe transfers and gait. 2. Ambulation:  Ambulate > 200 ft. S with LRAD for home mobility. 3. Patient Education:  Independent with HEP for home safety. 4. Stairs:  Up/Down 2 steps CGA without HR for home entry. physical Therapy TREATMENT    Patient: Gerard Anderson (24 y.o. male)  Date: 1/24/2018  Diagnosis: AVASCULAR NECROSIS RT HIP M87.051; OA RIGHT HIP M16.11; RIGHT HIP PAIN M25.551  Osteoarthritis of right hip  Osteoarthritis of right hip <principal problem not specified>  Procedure(s) (LRB):  RIGHT TOTAL HIP ARTHROPLASTY/BIOMET/2 SA'S (Right) 1 Day Post-Op  Precautions: Fall, WBAT (RLE)   Chart, physical therapy assessment, plan of care and goals were reviewed. ASSESSMENT:  Pt found amb hallway, eager to negotiate stairs. Pt able to confirm Hip precautions fully during amb. Pt amb to stair well with Supervision but required VC to discourage hip hike compensation during amb. Pt required VC for gait sequencing with stair negotiation. Pt amb 10 stairs ascending/descending with B hand rails and Supervision. Pt then amb back to room with Supervision, total amb distance at ~400 ft. Once in room pt was able to demonstrate log roll technique for bed transfers and initial VC. Pt returned to bedside chair with all needs met and call bell in reach. Pt has met all therapy goals at this time and is cleared for transfer to [address] [place] [address] for continued improvement of deficits.    Progression toward goals:  [x]      Improving appropriately and progressing toward goals  []      Improving slowly and progressing toward goals  []      Not making progress toward goals and plan of care will be adjusted     PLAN:  Patient continues to benefit from skilled intervention to address the above impairments. Continue treatment per established plan of care. Discharge Recommendations:  Home Health  Further Equipment Recommendations for Discharge:  rolling walker     SUBJECTIVE:   Patient stated I'm ready to go, lets do the stairs.     OBJECTIVE DATA SUMMARY:   Critical Behavior:  Neurologic State: Alert  Orientation Level: Oriented X4  Cognition: Appropriate decision making, Appropriate for age attention/concentration, Appropriate safety awareness  Safety/Judgement: Awareness of environment, Fall prevention  Functional Mobility Training:  Bed Mobility:  Rolling: Supervision  Supine to Sit: Supervision  Sit to Supine: Supervision  Scooting: Supervision  Transfers:  Sit to Stand: Supervision  Stand to Sit: Supervision  Balance:  Sitting: Intact  Standing: Intact; With support  Standing - Static: Good  Standing - Dynamic : Fair  Ambulation/Gait Training:  Distance (ft): 300 Feet (ft)  Assistive Device: Walker, rolling  Ambulation - Level of Assistance: Supervision  Gait Description (WDL): Exceptions to WDL  Gait Abnormalities: Antalgic;Decreased step clearance;Hip Hike  Right Side Weight Bearing: As tolerated  Base of Support: Shift to left  Speed/Mae: Accelerated  Step Length: Left shortened;Right shortened   Stairs:  Number of Stairs Trained: 10  Stairs - Level of Assistance: Supervision  Rail Use: Both  Pain:  Pain Scale 1: Numeric (0 - 10)  Pain Intensity 1: 0   Activity Tolerance:   Fair+  Please refer to the flowsheet for vital signs taken during this treatment.   After treatment:   [x] Patient left in no apparent distress sitting up in chair  [] Patient left in no apparent distress in bed  [x] Call bell left within reach  [x] Nursing notified  [] Caregiver present  [] Bed alarm activated      Jennifer Perea Oliva   Time Calculation: 14 mins    Mobility  Current  CI= 1-19%   Goal  CI= 1-19%  D/C  CI= 1-19%. The severity rating is based on the Level of Assistance required for Functional Mobility and ADLs.

## 2018-01-24 NOTE — ROUTINE PROCESS
Bedside and Verbal shift change report given to RN Jeremiah Ho (oncoming nurse) by Manjeet Wilhelm RN (offgoing nurse). Report included the following information SBAR, Kardex, MAR and Recent Results.     Manjeet Wilhelm RN

## 2018-01-24 NOTE — ROUTINE PROCESS
2000 Patient is alert and oriented times three with no signs or symptoms of distress. Dressing is clean drya nd intact and pulses palpable  0000 Pulses are palpable dressing clean dry and intact no signs or symptoms of distress at this time  0400 Patient is and alert and oriented times three with no sign sor symptoms of distress.  Dressing is clean dry and intact and pulses palpable  0630 Patient ambulated to bathroom and bathed himself

## 2018-01-24 NOTE — ROUTINE PROCESS
Bedside and Verbal shift change report given to Charanjit Thapa (oncoming nurse) by John Waldron RN (offgoing nurse). Report included the following information SBAR, Kardex, MAR and Recent Results.     John Waldron RN

## 2018-01-25 ENCOUNTER — HOME CARE VISIT (OUTPATIENT)
Dept: SCHEDULING | Facility: HOME HEALTH | Age: 63
End: 2018-01-25
Payer: COMMERCIAL

## 2018-01-25 ENCOUNTER — TELEPHONE (OUTPATIENT)
Dept: ORTHOPEDIC SURGERY | Facility: CLINIC | Age: 63
End: 2018-01-25

## 2018-01-25 ENCOUNTER — TELEPHONE (OUTPATIENT)
Dept: CASE MANAGEMENT | Age: 63
End: 2018-01-25

## 2018-01-25 VITALS
HEART RATE: 96 BPM | TEMPERATURE: 98.3 F | SYSTOLIC BLOOD PRESSURE: 100 MMHG | DIASTOLIC BLOOD PRESSURE: 63 MMHG | OXYGEN SATURATION: 96 % | RESPIRATION RATE: 18 BRPM

## 2018-01-25 PROCEDURE — 400013 HH SOC

## 2018-01-25 PROCEDURE — G0495 RN CARE TRAIN/EDU IN HH: HCPCS

## 2018-01-25 NOTE — TELEPHONE ENCOUNTER
Spoke with patient's wife Haley Fajardo, she states that he is doing great. He was able to fill his prescriptions, except for one and that they are waiting to get authorization from the doctor. He also has his follow up appointment made. She had no further questions or concerns for me at this time.

## 2018-01-25 NOTE — TELEPHONE ENCOUNTER
Calista Boateng from  called in requesting to speak with Dr. Josephus Peabody. He states that the patient explained to him that he could not see him as much as Dr. Josephus Peabody is requesting due to money issues. Also that the patient had a fever of 101.3, mcmahon asked him to retake it in an hour to see what it is because that patient had a heater running in the room and wanted to see if it was still at 101. Please contact Calista Boateng at [phone].

## 2018-01-25 NOTE — TELEPHONE ENCOUNTER
Advised patients wife, d/t patient being unavailable, that per Jason Ragland PA-C, he needs to go to the ER if oral temp is over 101. Also advised per ROSANNA Franco that care after sx is completely paid for up to 90 days following surgery. Patients wife verbalized understanding.

## 2018-01-25 NOTE — TELEPHONE ENCOUNTER
Please call patient and if oral temp is over 101 farenheit then send to ER, tell patient that care after sx is completely paid  For a period of 90 days following surgery.

## 2018-01-26 ENCOUNTER — TELEPHONE (OUTPATIENT)
Dept: ORTHOPEDIC SURGERY | Age: 63
End: 2018-01-26

## 2018-01-26 ENCOUNTER — HOME CARE VISIT (OUTPATIENT)
Dept: HOME HEALTH SERVICES | Facility: HOME HEALTH | Age: 63
End: 2018-01-26
Payer: COMMERCIAL

## 2018-01-26 VITALS — TEMPERATURE: 101.1 F

## 2018-01-26 PROCEDURE — G0151 HHCP-SERV OF PT,EA 15 MIN: HCPCS

## 2018-01-26 NOTE — PROGRESS NOTES
Subjective: 63 y.o., male, 1 Days Post-Op, Procedure(s):  RIGHT TOTAL HIP ARTHROPLASTY/BIOMET/2 SA'S or     Admitting date:23 Jan 18    Date of Discharge/Transfer: 24 Jan 18    Physical Exam (day of transfer / discharge): 24 Jan 18            History:  Past Medical History:   Diagnosis Date    HIV (human immunodeficiency virus infection) ([address]     Liver disease     Hepatitis C (treated)       Allergies:  No Known Allergies      History of Present Illness:     Mr. Esperanza Naylor is a pleasant male who suffered a well documented radiographic history of end stage osteo arthritis right hip. Esperanza Naylor had failed all conservative measures as directed by Dr. Adelfo Finney, and in light of Esperanza Naylor progressive pain and difficultly performing his  necessary Activities of Daily Living, Dr. Violetta Garay recommended a total right hip joint replacement. Social History     Occupational History    Not on file. 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 Esperanza Naylor was admitted to [facility] on the morning of 23 Jan 18 under the care of Dr. Adelfo Finney. he was taken to the operating theater under Dr. Kristopher David direction, first assisted by trained surgical assistant's where he underwent a Right total joint replacement. he tolerated the procedure well, and there were no intra operative complications. Post operatively he was transferred medically stable to post op holding. After all safety criteria had been met during his immediate post op recovery phase he was transferred medical stable to [address] [place] [address] to begin comprehensive physical and occupational therapies, restorative nursing and thrombo embolism (DVT/PE) prophylaxis.     Mr. Esperanza Naylor post operative course progressed slow but directed. The focus throughout the post op period focused on range of motion and pain control. An acute post operative blood loss anemia was noted post operatively and there was  no need to transfuse packed red blood cells. Medically he  remained stable through the remainder of the hospital stay. In light of the slow gains attained by Mr. Varun Rain post operatively he is being recommended for a directed course of comprehensive PT / OT at a  home       DISCHARGE PLAN:      The patient will be discharged to home    DIET:  Low Calorie High Protein Diet    NUTRITION: OTC Nutritional supplements, multivitamins      ACTIVITY: No lifting, Driving, or Strenuous exercise and No heavy lifting, pushing, pulling. Weight Bearing/Range of Motion Restrictions: Per Protocol    WOUND / INCISION CARE: Keep wound clean and dry, Reinforce dressing PRN and Ice to area for comfort Keep the current dressings on and in place. There is no need to change these current dressings. Dressings to please be changed by home nurse or nursing home staff each day. Keep all pets away from any wound present in order to prevent infection. PAIN CONTROL: Prescriptions written: On chart    PRECAUTIONS: Weight Bearing/Range of Motion per Restrictions:     VTE PROPHYLAXIS: with Lovenox 30mg SQ x 14 days. Adan Hose Stockings :Continue use at home. ANTIBIOTICS: None at this time. Physical and Occupational therapies:    will continue with attention to standard postop precautions / restrictions and below:    [ ] RUE [XX] RLE  [ ] LUE  [ ] LLE    [XX] Full WBAT   [ ] Partial WBAT   [ ] Toetouch WB   [ ] Non Weight Bearing: Follow up:    [XX] 1 week  [ ] 10 days  [ ] Specific Date:       Per [facility] Protocol this  case was discussed with attending surgeon and reflects their orders as confirmed by their co signature.      Very Respectfully,        Queen Hayden Franco, APA, APC, MPAS  Surigical Physician Assistant-C  Department [facility]   Contact Cell [phone]

## 2018-01-27 VITALS
WEIGHT: 185 LBS | RESPIRATION RATE: 18 BRPM | TEMPERATURE: 98.6 F | OXYGEN SATURATION: 98 % | HEIGHT: 70 IN | BODY MASS INDEX: 26.48 KG/M2 | HEART RATE: 95 BPM

## 2018-01-29 ENCOUNTER — HOME CARE VISIT (OUTPATIENT)
Dept: SCHEDULING | Facility: HOME HEALTH | Age: 63
End: 2018-01-29
Payer: COMMERCIAL

## 2018-01-29 ENCOUNTER — TELEPHONE (OUTPATIENT)
Dept: ORTHOPEDIC SURGERY | Facility: CLINIC | Age: 63
End: 2018-01-29

## 2018-01-29 VITALS
TEMPERATURE: 98.9 F | RESPIRATION RATE: 18 BRPM | HEART RATE: 81 BPM | OXYGEN SATURATION: 97 % | DIASTOLIC BLOOD PRESSURE: 70 MMHG | SYSTOLIC BLOOD PRESSURE: 105 MMHG

## 2018-01-29 DIAGNOSIS — G89.18 POST-OPERATIVE PAIN: Primary | ICD-10-CM

## 2018-01-29 PROCEDURE — G0151 HHCP-SERV OF PT,EA 15 MIN: HCPCS

## 2018-01-29 RX ORDER — HYDROMORPHONE HYDROCHLORIDE 4 MG/1
4 TABLET ORAL
Qty: 22 TAB | Refills: 0 | Status: SHIPPED | OUTPATIENT
Start: 2018-01-29 | End: 2018-02-14 | Stop reason: ALTCHOICE

## 2018-01-29 NOTE — TELEPHONE ENCOUNTER
Patient's wife Doug Senas called stating the patient is in constant pain and the medications are not working for him at all. Also, she states the prescription naloxegol (MOVANTIK) 25 mg tab tablet needs a prior authorization. Please advise Doug Waldron today regarding this at [phone].

## 2018-01-30 ENCOUNTER — HOME CARE VISIT (OUTPATIENT)
Dept: SCHEDULING | Facility: HOME HEALTH | Age: 63
End: 2018-01-30
Payer: COMMERCIAL

## 2018-01-30 VITALS
SYSTOLIC BLOOD PRESSURE: 132 MMHG | DIASTOLIC BLOOD PRESSURE: 87 MMHG | OXYGEN SATURATION: 95 % | TEMPERATURE: 97.5 F | RESPIRATION RATE: 18 BRPM

## 2018-01-30 PROCEDURE — G0299 HHS/HOSPICE OF RN EA 15 MIN: HCPCS

## 2018-02-01 ENCOUNTER — HOME CARE VISIT (OUTPATIENT)
Dept: SCHEDULING | Facility: HOME HEALTH | Age: 63
End: 2018-02-01
Payer: COMMERCIAL

## 2018-02-01 VITALS
SYSTOLIC BLOOD PRESSURE: 130 MMHG | RESPIRATION RATE: 18 BRPM | HEART RATE: 122 BPM | DIASTOLIC BLOOD PRESSURE: 90 MMHG | TEMPERATURE: 97.3 F

## 2018-02-01 PROCEDURE — G0151 HHCP-SERV OF PT,EA 15 MIN: HCPCS

## 2018-02-02 ENCOUNTER — OFFICE VISIT (OUTPATIENT)
Dept: ORTHOPEDIC SURGERY | Facility: CLINIC | Age: 63
End: 2018-02-02

## 2018-02-02 VITALS
DIASTOLIC BLOOD PRESSURE: 80 MMHG | SYSTOLIC BLOOD PRESSURE: 109 MMHG | OXYGEN SATURATION: 96 % | TEMPERATURE: 96.5 F | HEIGHT: 70 IN | RESPIRATION RATE: 18 BRPM | WEIGHT: 188.4 LBS | HEART RATE: 119 BPM | BODY MASS INDEX: 26.97 KG/M2

## 2018-02-02 DIAGNOSIS — Z96.641 AFTERCARE FOLLOWING RIGHT HIP JOINT REPLACEMENT SURGERY: ICD-10-CM

## 2018-02-02 DIAGNOSIS — Z48.89 ENCOUNTER FOR POSTOPERATIVE WOUND CHECK: ICD-10-CM

## 2018-02-02 DIAGNOSIS — M16.11 PRIMARY OSTEOARTHRITIS OF RIGHT HIP: Primary | ICD-10-CM

## 2018-02-02 DIAGNOSIS — G89.18 POST-OP PAIN: ICD-10-CM

## 2018-02-02 DIAGNOSIS — Z48.02: ICD-10-CM

## 2018-02-02 DIAGNOSIS — Z47.1 AFTERCARE FOLLOWING RIGHT HIP JOINT REPLACEMENT SURGERY: ICD-10-CM

## 2018-02-02 RX ORDER — OXYCODONE HYDROCHLORIDE 5 MG/1
5-15 TABLET ORAL
Qty: 28 TAB | Refills: 0 | Status: SHIPPED | OUTPATIENT
Start: 2018-02-02 | End: 2018-02-14 | Stop reason: ALTCHOICE

## 2018-02-02 NOTE — MR AVS SNAPSHOT
[address] [place] [address], [address] [facility] [address] 
[phone] Patient: Carey Yancey MRN: [id number] Visit Information Date & Time Provider Department Dept. Phone Encounter #  
 2/2/2018  1:00 PM Ghada Flores, [address] [facility] - [place] [address] [phone] [id number] Upcoming Health Maintenance Date Due Hepatitis C Screening 1955 Pneumococcal 19-64 Highest Risk (1 of 3 - PCV13) 8/10/1976 DTaP/Tdap/Td series (1 - Tdap) 8/10/1978 FOBT Q 1 YEAR AGE 50-75 8/10/2007 ZOSTER VACCINE AGE 60> 6/10/2017 Influenza Age 5 to Adult 8/1/2017 Allergies as of 2/2/2018  Review Complete On: 2/2/2018 By: Minnie Vogel No Known Allergies Current Immunizations  Never Reviewed No immunizations on file. Not reviewed this visit You Were Diagnosed With   
  
 Codes Comments Primary osteoarthritis of right hip    -  Primary ICD-10-CM: M16.11 
ICD-9-CM: 715.15 Aftercare following right hip joint replacement surgery     ICD-10-CM: Z47.1, V52.285 ICD-9-CM: V54.81, V43.64 Encounter for postoperative wound check     ICD-10-CM: Z48.89 ICD-9-CM: V58.49 Encounter for removal of staples     ICD-10-CM: Z48.02 
ICD-9-CM: V58.32 Post-op pain     ICD-10-CM: G89.18 
ICD-9-CM: 338.18 Vitals BP Pulse Temp Resp Height(growth percentile) Weight(growth percentile) 109/80 (!) 119 96.5 °F (35.8 °C) (Oral) 18 5' 10\" (1.778 m) 188 lb 6.4 oz (85.5 kg) SpO2 BMI Smoking Status 96% 27.03 kg/m2 Former Smoker BMI and BSA Data Body Mass Index Body Surface Area  
 27.03 kg/m 2 2.05 m 2 Your Updated Medication List  
  
   
This list is accurate as of: 2/2/18  1:32 PM.  Always use your most recent med list.  
  
  
  
  
 enoxaparin 30 mg/0.3 mL injection Commonly known as:  LOVENOX 0.3 mL by SubCUTAneous route every twenty-four (24) hours for 14 doses. Indications: DEEP VEIN THROMBOSIS PREVENTION, PULMONARY THROMBOEMBOLISM PREVENTION  
  
 HYDROmorphone 4 mg tablet Commonly known as:  DILAUDID Take 1 Tab by mouth every six (6) hours as needed for Pain. Max Daily Amount: 16 mg.  
  
 naloxegol 25 mg Tab tablet Commonly known as:  Myna Keen Take 1 Tab by mouth Daily (before breakfast). Indications: OPIOID-INDUCED CONSTIPATION  
  
 oxyCODONE IR 5 mg immediate release tablet Commonly known as:  Ester Bhattisybil Take 1-3 Tabs by mouth every four (4) hours as needed. Max Daily Amount: 90 mg. Indications: Pain TRIUMEQ tablet Generic drug:  abacavir-dolutegravir-lamiVUDine Take 1 Tab by mouth daily. [id number] Prescriptions Printed Refills  
 oxyCODONE IR (ROXICODONE) 5 mg immediate release tablet 0 Sig: Take 1-3 Tabs by mouth every four (4) hours as needed. Max Daily Amount: 90 mg. Indications: Pain Class: Print Route: Oral  
  
To-Do List   
 02/05/2018 To Be Determined Appointment with Kassie Unger PT at [address] [facility]  
  
 02/06/2018 To Be Determined Appointment with Marquise Figueroa at [address] [facility]! [facility] introduces [employer] patient portal. Now you can access parts of your medical record, email your doctor's office, and request medication refills online. 1. In your internet browser, go to [email or web address] 2. Click on the First Time User? Click Here link in the Sign In box. You will see the New Member Sign Up page. 3. Enter your [employer] Access Code exactly as it appears below. You will not need to use this code after youve completed the sign-up process. If you do not sign up before the expiration date, you must request a new code.  
 
· [employer] Access Code: [email or web address] 
 Expires: 4/17/2018  9:46 AM 
 
4. Enter the last four digits of your Social Security Number (xxxx) and Date of Birth (mm/dd/yyyy) as indicated and click Submit. You will be taken to the next sign-up page. 5. Create a [employer] ID. This will be your [employer] login ID and cannot be changed, so think of one that is secure and easy to remember. 6. Create a [employer] password. You can change your password at any time. 7. Enter your Password Reset Question and Answer. This can be used at a later time if you forget your password. 8. Enter your e-mail address. You will receive e-mail notification when new information is available in [address] 9. Click Sign Up. You can now view and download portions of your medical record. 10. Click the Download Summary menu link to download a portable copy of your medical information. If you have questions, please visit the Frequently Asked Questions section of the [employer] website. Remember, [employer] is NOT to be used for urgent needs. For medical emergencies, dial 911. Now available from your iPhone and Android! Please provide this summary of care documentation to your next provider. Your primary care clinician is listed as Linda Petty. If you have any questions after today's visit, please call [phone].

## 2018-02-02 NOTE — PROGRESS NOTES
HISTORY:  The patient returns currently 11 days status post his right hip replacement. He is generally doing well. He is at home. He is continuing his Lovenox. He is walking with assistance with a four-post rolling walker. He is full weightbearing of his right lower extremity. EXAMINATION:  Physical examination today reveals the surgical site is intact associated with the proximal anterior hip measuring pole to pole in a cranial/caudal orientation 15 cm. Surgical staples are noted. Wound borders are well approximated. No evidence of wound breakdown or infection. Passively with him sitting up knee bent at 90º, his internal and external rotation of the right hip is 8º and 12º respectfully. Distal sensation intact fully in the right lower extremity. Capillary refill is brisk and less than two seconds in the right lower extremity. There is no evidence of DVT or calf tenderness. Quadriceps and femoral nerve activity is full in the right lower extremity. PROCEDURE:  Today, using clean technique all staples are removed today. Steri-strips are placed. The patient was placed in a sterile top cover. IMPRESSION:    1. Status post right total hip replacement, stable, doing well. 2. Right hip pain. Medication causing itching despite Benadryl usage. 3. Right hip range of motion guarded. 4. HIV positive. 5. Decreased range of motion of the right hip secondary to his recent surgery and deconditioning which was present prior to surgery. PLAN:  He may shower. He will avoid any sitz baths. He will follow-up with us in about two weeks. We are going to begin outpatient physical therapy per protocol. The patient changed back from Hydromorphone to Oxycodone. He was given a prescription today.

## 2018-02-05 ENCOUNTER — HOME CARE VISIT (OUTPATIENT)
Dept: SCHEDULING | Facility: HOME HEALTH | Age: 63
End: 2018-02-05
Payer: COMMERCIAL

## 2018-02-05 VITALS
DIASTOLIC BLOOD PRESSURE: 90 MMHG | OXYGEN SATURATION: 98 % | TEMPERATURE: 99.8 F | HEART RATE: 136 BPM | RESPIRATION RATE: 18 BRPM | SYSTOLIC BLOOD PRESSURE: 130 MMHG

## 2018-02-06 ENCOUNTER — HOME CARE VISIT (OUTPATIENT)
Dept: SCHEDULING | Facility: HOME HEALTH | Age: 63
End: 2018-02-06
Payer: COMMERCIAL

## 2018-02-06 PROCEDURE — G0299 HHS/HOSPICE OF RN EA 15 MIN: HCPCS

## 2018-02-07 VITALS — OXYGEN SATURATION: 98 % | RESPIRATION RATE: 18 BRPM | HEART RATE: 77 BPM | TEMPERATURE: 98.6 F

## 2018-02-08 ENCOUNTER — HOME CARE VISIT (OUTPATIENT)
Dept: HOME HEALTH SERVICES | Facility: HOME HEALTH | Age: 63
End: 2018-02-08
Payer: COMMERCIAL

## 2018-02-09 VITALS
HEART RATE: 136 BPM | TEMPERATURE: 97.1 F | DIASTOLIC BLOOD PRESSURE: 80 MMHG | RESPIRATION RATE: 18 BRPM | SYSTOLIC BLOOD PRESSURE: 130 MMHG | OXYGEN SATURATION: 98 %

## 2018-02-12 ENCOUNTER — TELEPHONE (OUTPATIENT)
Dept: ORTHOPEDIC SURGERY | Age: 63
End: 2018-02-12

## 2018-02-12 NOTE — TELEPHONE ENCOUNTER
PT HAS COMPLETED HIS  METOPROLOL TARTRATE 25MG ORDERED BY HIS PCP.   WANTS TO KNOW IF HE NEEDS TO CONTINUE TAKING ASPIRIN ALONG WITH IT.      PT A#[phone]

## 2018-02-14 ENCOUNTER — OFFICE VISIT (OUTPATIENT)
Dept: ORTHOPEDIC SURGERY | Facility: CLINIC | Age: 63
End: 2018-02-14

## 2018-02-14 VITALS
DIASTOLIC BLOOD PRESSURE: 37 MMHG | HEIGHT: 70 IN | TEMPERATURE: 96.3 F | WEIGHT: 185 LBS | HEART RATE: 86 BPM | BODY MASS INDEX: 26.48 KG/M2 | OXYGEN SATURATION: 98 % | SYSTOLIC BLOOD PRESSURE: 55 MMHG

## 2018-02-14 DIAGNOSIS — M16.11 PRIMARY OSTEOARTHRITIS OF RIGHT HIP: Primary | ICD-10-CM

## 2018-02-14 DIAGNOSIS — Z96.641 AFTERCARE FOLLOWING RIGHT HIP JOINT REPLACEMENT SURGERY: ICD-10-CM

## 2018-02-14 DIAGNOSIS — Z47.1 AFTERCARE FOLLOWING RIGHT HIP JOINT REPLACEMENT SURGERY: ICD-10-CM

## 2018-02-14 RX ORDER — OXYCODONE AND ACETAMINOPHEN 7.5; 325 MG/1; MG/1
1 TABLET ORAL
Qty: 28 TAB | Refills: 0 | Status: SHIPPED | OUTPATIENT
Start: 2018-02-14 | End: 2018-03-07 | Stop reason: SDUPTHER

## 2018-02-14 NOTE — MR AVS SNAPSHOT
Husam Sink 
 
 
 [address] [facility], [address] [place] [id number] [phone] Patient: Yuli Martin MRN: [id number] ZF Visit Information Date & Time Provider Department Dept. Phone Encounter #  
 2018 10:45 AM Giancarlo Uribe PA-C [place] [facility] - [facility] [phone] [id number] Follow-up Instructions Return in about 3 weeks (around 3/7/2018). Your Appointments 2018 10:30 AM  
New Patient with Alexis Baker MD  
[facility] ([facility]) Appt Note: Per Dr Nely Carlos to be seen for CP/Tacky/records faxed [address] [facility] [address]  
  
   
 [address] [place] [address] Upcoming Health Maintenance Date Due Hepatitis C Screening 1955 Pneumococcal 19-64 Highest Risk (1 of 3 - PCV13) 8/10/1976 DTaP/Tdap/Td series (1 - Tdap) 8/10/1978 FOBT Q 1 YEAR AGE 50-75 8/10/2007 ZOSTER VACCINE AGE 60> 6/10/2017 Influenza Age 5 to Adult 2017 Allergies as of 2018  Review Complete On: 2018 By: Lani Monreal LPN Severity Noted Reaction Type Reactions Doxycycline  2008    Swelling Current Immunizations  Never Reviewed No immunizations on file. Not reviewed this visit You Were Diagnosed With   
  
 Codes Comments Primary osteoarthritis of right hip    -  Primary ICD-10-CM: M16.11 
ICD-9-CM: 715.15 Aftercare following right hip joint replacement surgery     ICD-10-CM: Z47.1, M20.277 ICD-9-CM: V54.81, V43.64 Vitals BP Pulse Temp Height(growth percentile) Weight(growth percentile) SpO2  
 (!) 55/37 86 96.3 °F (35.7 °C) (Oral) 5' 10\" (1.778 m) 185 lb (83.9 kg) 98% BMI Smoking Status 26.54 kg/m2 Former Smoker Vitals History BMI and BSA Data  Body Mass Index Body Surface Area  
 26.54 kg/m 2 2.04 m 2  
  
  
 Your Updated Medication List  
  
   
This list is accurate as of: 2/14/18 11:27 AM.  Always use your most recent med list.  
  
  
  
  
 metoprolol tartrate 50 mg tablet Commonly known as:  LOPRESSOR Take 50 mg by mouth two (2) times a day.  
  
 naloxegol 25 mg Tab tablet Commonly known as:  [place] Take 1 Tab by mouth Daily (before breakfast). Indications: OPIOID-INDUCED CONSTIPATION  
  
 oxyCODONE-acetaminophen 7.5-325 mg per tablet Commonly known as:  PERCOCET Take 1 Tab by mouth every six (6) hours as needed for Pain. Max Daily Amount: 4 Tabs. TRIUMEQ tablet Generic drug:  abacavir-dolutegravir-lamiVUDine Take 1 Tab by mouth daily. [id number] Prescriptions Printed Refills  
 oxyCODONE-acetaminophen (PERCOCET) 7.5-325 mg per tablet 0 Sig: Take 1 Tab by mouth every six (6) hours as needed for Pain. Max Daily Amount: 4 Tabs. Class: Print Route: Oral  
  
We Performed the Following AMB POC X-RAY RADEX HIP UNILATERAL WITH PELVIS 1 VIEW [46292 CPT(R)] Follow-up Instructions Return in about 3 weeks (around 3/7/2018). Introducing [facility]! [facility] introduces [employer] patient portal. Now you can access parts of your medical record, email your doctor's office, and request medication refills online. 1. In your internet browser, go to [email or web address] 2. Click on the First Time User? Click Here link in the Sign In box. You will see the New Member Sign Up page. 3. Enter your [employer] Access Code exactly as it appears below. You will not need to use this code after youve completed the sign-up process. If you do not sign up before the expiration date, you must request a new code. · [employer] Access Code: [email or web address] Expires: 4/17/2018  9:46 AM 
 
4. Enter the last four digits of your Social Security Number (xxxx) and Date of Birth (mm/dd/yyyy) as indicated and click Submit.  You will be taken to the next sign-up page. 5. Create a [employer] ID. This will be your [employer] login ID and cannot be changed, so think of one that is secure and easy to remember. 6. Create a [employer] password. You can change your password at any time. 7. Enter your Password Reset Question and Answer. This can be used at a later time if you forget your password. 8. Enter your e-mail address. You will receive e-mail notification when new information is available in [address] 9. Click Sign Up. You can now view and download portions of your medical record. 10. Click the Download Summary menu link to download a portable copy of your medical information. If you have questions, please visit the Frequently Asked Questions section of the [employer] website. Remember, [employer] is NOT to be used for urgent needs. For medical emergencies, dial 911. Now available from your iPhone and Android! Please provide this summary of care documentation to your next provider. Your primary care clinician is listed as Nemo Gray. If you have any questions after today's visit, please call [phone].

## 2018-02-14 NOTE — PROGRESS NOTES
HISTORY:  The patient returns and he is currently one month status post his right primary total hip replacement under the care of Dr. Terrence Reece. He is doing well. He has completed his home therapy. He has not started outpatient therapy to date. He has completed his thromboembolism prophylaxis with Lovenox. He is holding on his Aspirin, trying to determine through his PCP whether it is actually needed. A recommendation was made on his discharge to begin after he finished the Lovenox. He is walking with the assistance of a four-post rolling walker alternating with a single cane. He is using Dilaudid for pain management and requests a weaker medication. REVIEW OF SYSTEMS:  No chest pain. He does have exertional dyspnea, chronic in nature. No fevers, chills, or night sweats reported. No rash, itching, nausea, or vomiting. EXAMINATION:  Physical examination today reveals a pleasant, healthy appearing, well-developed, well-nourished, obese, 82-year-old, -American male, atraumatic and normocephalic. He is alert and oriented x 3. He is found sitting comfortably in the examination room today on the table. He lies left recumbent without difficulty. Examination of the proximal anterior right hip reveals an intact surgical incision, healed nicely. It is nontender. Wound borders are well approximated. Internal and external rotation of the hip passively is 10º and 12º. Distal sensation intact fully in the right lower extremity. There is no evidence of DVT or calf tenderness. RADIOGRAPHS:  X-rays, three views AP of the pelvis and right hip and cross-table lateral reveals an intact, well-seated total hip replacement with anatomical alignment. There is no evidence of periprosthetic fracturing or dislocation. There is one screw affixing the cup to the acetabulum. PLAN:  The patient is going to restart outpatient physical therapy.   He was refilled of Oxycodone with Acetaminophen in the form of Percocet 7.5 mg. We will plan on seeing him back in about three weeks. I am going to discuss with therapy his co-pay if he has any and he will start 81 mg of Aspirin orally on a daily basis.

## 2018-02-16 ENCOUNTER — OFFICE VISIT (OUTPATIENT)
Dept: CARDIOLOGY CLINIC | Age: 63
End: 2018-02-16

## 2018-02-16 VITALS
WEIGHT: 185 LBS | HEIGHT: 70 IN | HEART RATE: 86 BPM | SYSTOLIC BLOOD PRESSURE: 121 MMHG | BODY MASS INDEX: 26.48 KG/M2 | DIASTOLIC BLOOD PRESSURE: 86 MMHG

## 2018-02-16 DIAGNOSIS — Z82.49 FAMILY HISTORY OF CORONARY ARTERIOSCLEROSIS: ICD-10-CM

## 2018-02-16 DIAGNOSIS — R07.9 CHEST PAIN, UNSPECIFIED TYPE: ICD-10-CM

## 2018-02-16 DIAGNOSIS — R00.2 PALPITATION: Primary | ICD-10-CM

## 2018-02-16 DIAGNOSIS — I10 ESSENTIAL HYPERTENSION: ICD-10-CM

## 2018-02-16 DIAGNOSIS — M16.11 OSTEOARTHRITIS OF RIGHT HIP, UNSPECIFIED OSTEOARTHRITIS TYPE: ICD-10-CM

## 2018-02-16 DIAGNOSIS — R00.2 PALPITATION: ICD-10-CM

## 2018-02-16 RX ORDER — ASPIRIN 81 MG/1
81 TABLET ORAL DAILY
COMMUNITY

## 2018-02-16 NOTE — PROGRESS NOTES
HISTORY OF PRESENT ILLNESS  Albaro Ward is a 63 y.o. male. New Patient   The history is provided by the patient. Associated symptoms include chest pain. Pertinent negatives include no abdominal pain, no headaches and no shortness of breath. Palpitations    The history is provided by the patient. This is a new problem. The current episode started more than 1 week ago. The problem has been gradually worsening. The problem occurs every several days. The problem is associated with nothing. Associated symptoms include chest pain. Pertinent negatives include no fever, no claudication, no orthopnea, no PND, no abdominal pain, no nausea, no vomiting, no headaches, no dizziness, no weakness, no cough, no hemoptysis, no shortness of breath and no sputum production. Chest Pain    The history is provided by the patient. This is a new problem. The current episode started more than 1 week ago. The problem has not changed since onset. The problem occurs rarely. The pain is associated with exertion. The pain is present in the substernal region and left side. The pain is mild. The quality of the pain is described as pressure-like. Associated symptoms include palpitations. Pertinent negatives include no abdominal pain, no claudication, no cough, no dizziness, no fever, no headaches, no hemoptysis, no nausea, no orthopnea, no PND, no shortness of breath, no sputum production, no vomiting and no weakness. Review of Systems   Constitutional: Negative for chills and fever. HENT: Negative for nosebleeds. Eyes: Negative for blurred vision and double vision. Respiratory: Negative for cough, hemoptysis, sputum production, shortness of breath and wheezing. Cardiovascular: Positive for chest pain and palpitations. Negative for orthopnea, claudication, leg swelling and PND. Gastrointestinal: Negative for abdominal pain, heartburn, nausea and vomiting. Musculoskeletal: Negative for myalgias. Skin: Negative for rash. Neurological: Negative for dizziness, weakness and headaches. Endo/Heme/Allergies: Does not bruise/bleed easily. Family History   Problem Relation Age of Onset    No Known Problems Mother     Heart Attack Father     Heart Attack Brother     Heart Attack Paternal Aunt     Heart Attack Maternal Grandmother     Heart Attack Paternal Grandmother        Past Medical History:   Diagnosis Date    Chest pain 2/16/2018    ? angina,chest wall,    Family history of coronary arteriosclerosis 2/16/2018    brother mi age 48    HIV (human immunodeficiency virus infection) ([facility] [address]     Liver disease     Hepatitis C (treated)    Osteoarthritis of right hip 1/23/2018    Palpitation 2/16/2018    recently worse        Past Surgical History:   Procedure Laterality Date    HX HIP REPLACEMENT Right 01/23/2017    NEUROLOGICAL PROCEDURE UNLISTED      neck surgery       Social History   Substance Use Topics    Smoking status: Former Smoker     Types: Cigarettes     Quit date: 2/16/2013    Smokeless tobacco: Never Used    Alcohol use No       Allergies   Allergen Reactions    Doxycycline Swelling       Prior to Admission medications    Medication Sig Start Date End Date Taking? Authorizing Provider   aspirin delayed-release 81 mg tablet Take  by mouth daily. Yes Historical Provider   oxyCODONE-acetaminophen (PERCOCET) 7.5-325 mg per tablet Take 1 Tab by mouth every six (6) hours as needed for Pain. Max Daily Amount: 4 Tabs. 2/14/18  Yes Jim Franco PA-C   metoprolol tartrate (LOPRESSOR) 50 mg tablet Take 50 mg by mouth two (2) times a day. 2/6/18  Yes Historical Provider   naloxegol (MOVANTIK) 25 mg tab tablet Take 1 Tab by mouth Daily (before breakfast). Indications: OPIOID-INDUCED CONSTIPATION 1/24/18  Yes Jim Franco PA-C   abacavir-dolutegravir-lamiVUDine (TRIUMEQ) tablet Take 1 Tab by mouth daily.  [id number]   Yes Historical Provider         Visit Vitals    /86    Pulse 86    Ht 5' 10\" (1.778 m)    Wt 83.9 kg (185 lb)    BMI 26.54 kg/m2       Physical Exam   Constitutional: He is oriented to person, place, and time. He appears well-developed and well-nourished. HENT:   Head: Normocephalic and atraumatic. Eyes: Conjunctivae are normal.   Neck: Neck supple. No JVD present. No tracheal deviation present. No thyromegaly present. Cardiovascular: Normal rate, regular rhythm and normal heart sounds. Exam reveals no gallop and no friction rub. No murmur heard. Pulmonary/Chest: Breath sounds normal. No respiratory distress. He has no wheezes. He has no rales. He exhibits no tenderness. Abdominal: Soft. There is no tenderness. Musculoskeletal: He exhibits no edema. Neurological: He is alert and oriented to person, place, and time. Skin: Skin is warm and dry. Psychiatric: He has a normal mood and affect. Mr. Gustavo Lao has a reminder for a \"due or due soon\" health maintenance. I have asked that he contact his primary care provider for follow-up on this health maintenance. I have personally reviewed patients ekg done at other facility. I Have personally reviewed recent relevant labs available and discussed with patient      Assessment         ICD-10-CM ICD-9-CM    1. Palpitation R00.2 785.1 SCHEDULE NUCLEAR STUDY      2D ECHO COMPLETE ADULT (TTE)      ECG,PT DEMAND EVENT,PRESYMPT MEMORY LOOP      T4, FREE      TSH 3RD GENERATION    recently worse     2. Chest pain, unspecified type R07.9 786.50 SCHEDULE NUCLEAR STUDY      2D ECHO COMPLETE ADULT (TTE)      ECG,PT DEMAND EVENT,PRESYMPT MEMORY LOOP      METABOLIC PANEL, BASIC      LIPID PANEL    ? angina,chest wall,   3. Osteoarthritis of right hip, unspecified osteoarthritis type M16.11 715.95    4. Family history of coronary arteriosclerosis Z82.49 V17.3 SCHEDULE NUCLEAR STUDY      2D ECHO COMPLETE ADULT (TTE)      ECG,PT DEMAND EVENT,PRESYMPT MEMORY LOOP    brother mi  age 54   10.  Essential hypertension I10 401.9     controlled There are no discontinued medications. Orders Placed This Encounter    ECG,PT DEMAND EVENT,PRESYMPT MEMORY LOOP    METABOLIC PANEL, BASIC     Standing Status:   Future     Standing Expiration Date:   3/18/2018    LIPID PANEL     Standing Status:   Future     Standing Expiration Date:   8/17/2018    T4, FREE     Standing Status:   Future     Standing Expiration Date:   2/17/2019    TSH 3RD GENERATION     Standing Status:   Future     Standing Expiration Date:   3/18/2018    SCHEDULE NUCLEAR STUDY     lexiscan stress test     Standing Status:   Future     Standing Expiration Date:   2/16/2019    2D ECHO COMPLETE ADULT (TTE)     Standing Status:   Future     Standing Expiration Date:   8/15/2018     Order Specific Question:   Reason for Exam:     Answer:   see diagnosis       Follow-up Disposition:  Return for F/u after tests.

## 2018-02-16 NOTE — PROGRESS NOTES
1. Have you been to the ER, urgent care clinic since your last visit? Hospitalized since your last visit? Yes, [facility] for hip replacement    2. Have you seen or consulted any other health care providers outside of the [address] [place] [address] since your last visit? Include any pap smears or colon screening. Yes, pcp    3. Since your last visit, have you had any of the following symptoms? No cardiac symptoms    4. Have you had any blood work, X-rays or cardiac testing?      yes, [facility]              5.  Where do you normally have your labs drawn?   [facility]    6. Do you need any refills today?   no

## 2018-02-16 NOTE — LETTER
Varun Blind 1955 
 
2/16/2018 Dear Víctor Patino MD 
 
I had the pleasure of evaluating  Mr. Myles Navarrete in office today. Below are the relevant portions of my assessment and plan of care. ICD-10-CM ICD-9-CM 1. Palpitation R00.2 785.1 SCHEDULE NUCLEAR STUDY  
   2D ECHO COMPLETE ADULT (TTE)  
   ECG,PT DEMAND EVENT,PRESYMPT MEMORY LOOP T4, FREE  
   TSH 3RD GENERATION  
 recently worse 2. Chest pain, unspecified type R07.9 786.50 SCHEDULE NUCLEAR STUDY  
   2D ECHO COMPLETE ADULT (TTE)  
   ECG,PT DEMAND EVENT,PRESYMPT MEMORY LOOP  
   METABOLIC PANEL, BASIC  
   LIPID PANEL  
 ? angina,chest wall,  
3. Osteoarthritis of right hip, unspecified osteoarthritis type M16.11 715.95   
4. Family history of coronary arteriosclerosis Z82.49 V17.3 SCHEDULE NUCLEAR STUDY  
   2D ECHO COMPLETE ADULT (TTE)  
   ECG,PT DEMAND EVENT,PRESYMPT MEMORY LOOP  
 brother mi 
age 48  
11. Essential hypertension I10 401.9   
 controlled Current Outpatient Prescriptions Medication Sig Dispense Refill  aspirin delayed-release 81 mg tablet Take  by mouth daily.  oxyCODONE-acetaminophen (PERCOCET) 7.5-325 mg per tablet Take 1 Tab by mouth every six (6) hours as needed for Pain. Max Daily Amount: 4 Tabs. 28 Tab 0  
 metoprolol tartrate (LOPRESSOR) 50 mg tablet Take 50 mg by mouth two (2) times a day.  naloxegol (MOVANTIK) 25 mg tab tablet Take 1 Tab by mouth Daily (before breakfast). Indications: OPIOID-INDUCED CONSTIPATION 30 Tab 0  
 abacavir-dolutegravir-lamiVUDine (TRIUMEQ) tablet Take 1 Tab by mouth daily. [id number] Orders Placed This Encounter  ECG,PT DEMAND EVENT,PRESYMPT MEMORY LOOP  METABOLIC PANEL, BASIC Standing Status:   Future Standing Expiration Date:   3/18/2018  LIPID PANEL Standing Status:   Future Standing Expiration Date:   8/17/2018  T4, FREE Standing Status:   Future Standing Expiration Date:   2/17/2019  TSH 3RD GENERATION Standing Status:   Future Standing Expiration Date:   3/18/2018  SCHEDULE NUCLEAR STUDY  
  lexiscan stress test  
  Standing Status:   Future Standing Expiration Date:   2/16/2019  2D ECHO COMPLETE ADULT (TTE) Standing Status:   Future Standing Expiration Date:   8/15/2018 Order Specific Question:   Reason for Exam: Answer:   see diagnosis  aspirin delayed-release 81 mg tablet Sig: Take  by mouth daily. If you have questions, please do not hesitate to call me. I look forward to following Mr. Keely Montanez along with you. Sincerely, Peewee Cavanaugh MD

## 2018-02-16 NOTE — MR AVS SNAPSHOT
[facility] 
 
 
 [address] [facility] 
[phone] Patient: Linda Ng MRN: [id number] BHF:[id number] Visit Information Date & Time Provider Department Dept. Phone Encounter #  
 2/16/2018 10:30 AM Gauri Shelton MD [facility] [phone] [id number] Follow-up Instructions Return for F/u after tests. Your Appointments 2/16/2018 10:30 AM  
New Patient with Gauri Shelton MD  
[facility] ([address] [place] [address]) Appt Note: Per Dr Mary Carmen Cote to be seen for CP/Tacky/records faxed [address] [facility] [address]  
  
   
 [address] [id number] [address] [address]  
  
    
 3/1/2018  8:00 AM  
PROCEDURE with [facility] ([facility]) Appt Note: selvin/matthews [address] [place] [address] [address]  
  
   
 [address] [id number] [address] [address]  
  
    
 3/7/2018 10:15 AM  
Follow Up with Sraa Lozano PA-C  
[facility] [address] [place] [address]) Appt Note: 3 WK FU RT HIP  
 [address] [facility], [address] [facility] [address] [phone]  
  
   
 [address] [facility], [address] [place] [address] 4/17/2018 11:15 AM  
Follow Up with Gauri Shelton MD  
[facility] ([address] [place] [address]) Appt Note: nuc/event bmp/lipid/t4/tsh follow up  
 [address] [facility] [address]  
  
   
 [address] [id number] [address] [address] Upcoming Health Maintenance Date Due Hepatitis C Screening 1955 Pneumococcal 19-64 Highest Risk (1 of 3 - PCV13) 8/10/1976 DTaP/Tdap/Td series (1 - Tdap) 8/10/1978 FOBT Q 1 YEAR AGE 50-75 8/10/2007 ZOSTER VACCINE AGE 60> 6/10/2017 Influenza Age 5 to Adult 8/1/2017 Allergies as of 2/16/2018  Review Complete On: 2/16/2018 By: Gauri Shelton MD  
  
 Severity Noted Reaction Type Reactions Doxycycline  03/23/2008    Swelling Current Immunizations  Never Reviewed No immunizations on file. Not reviewed this visit You Were Diagnosed With   
  
 Codes Comments Palpitation    -  Primary ICD-10-CM: R00.2 ICD-9-CM: 785.1 recently worse Chest pain, unspecified type     ICD-10-CM: R07.9 ICD-9-CM: 786.50 ? angina,chest wall, Osteoarthritis of right hip, unspecified osteoarthritis type     ICD-10-CM: M16.11 
ICD-9-CM: 715.95 Family history of coronary arteriosclerosis     ICD-10-CM: Z82.49 
ICD-9-CM: V17.3 brother mi 
age 48 Essential hypertension     ICD-10-CM: I10 
ICD-9-CM: 401.9 controlled Vitals BP Pulse Height(growth percentile) Weight(growth percentile) BMI Smoking Status 121/86 86 5' 10\" (1.778 m) 185 lb (83.9 kg) 26.54 kg/m2 Former Smoker Vitals History BMI and BSA Data Body Mass Index Body Surface Area  
 26.54 kg/m 2 2.04 m 2 Your Updated Medication List  
  
   
This list is accurate as of: 2/16/18 10:22 AM.  Always use your most recent med list.  
  
  
  
  
 aspirin delayed-release 81 mg tablet Take  by mouth daily. metoprolol tartrate 50 mg tablet Commonly known as:  LOPRESSOR Take 50 mg by mouth two (2) times a day.  
  
 naloxegol 25 mg Tab tablet Commonly known as:  Tilford Herd Take 1 Tab by mouth Daily (before breakfast). Indications: OPIOID-INDUCED CONSTIPATION  
  
 oxyCODONE-acetaminophen 7.5-325 mg per tablet Commonly known as:  PERCOCET Take 1 Tab by mouth every six (6) hours as needed for Pain. Max Daily Amount: 4 Tabs. TRIUMEQ tablet Generic drug:  abacavir-dolutegravir-lamiVUDine Take 1 Tab by mouth daily. [id number] We Performed the Following   
 ECG,PT DEMAND EVENT,PRESYMPT MEMORY LOOP [24568 CPT(R)] Follow-up Instructions Return for F/u after tests. To-Do List   
 02/16/2018 Lab:  LIPID PANEL   
  
 02/16/2018 Lab:  T4, FREE   
  
 02/19/2018 Cardiac Services:  2D ECHO COMPLETE ADULT (TTE)   
  
 02/23/2018 Lab:  METABOLIC PANEL, BASIC   
  
 02/23/2018 Nursing:  SCHEDULE NUCLEAR STUDY   
  
 02/23/2018 Lab:  TSH 3RD GENERATION Introducing [facility] SERVICES! Ion Bah introduces YUPIQ patient portal. Now you can access parts of your medical record, email your doctor's office, and request medication refills online. 1. In your internet browser, go to https://"[employer]. LoginRadius/"[employer] 2. Click on the First Time User? Click Here link in the Sign In box. You will see the New Member Sign Up page. 3. Enter your YUPIQ Access Code exactly as it appears below. You will not need to use this code after youve completed the sign-up process. If you do not sign up before the expiration date, you must request a new code. · YUPIQ Access Code: [email or web address] Expires: 4/17/2018  9:46 AM 
 
4. Enter the last four digits of your Social Security Number (xxxx) and Date of Birth (mm/dd/yyyy) as indicated and click Submit. You will be taken to the next sign-up page. 5. Create a YUPIQ ID. This will be your YUPIQ login ID and cannot be changed, so think of one that is secure and easy to remember. 6. Create a YUPIQ password. You can change your password at any time. 7. Enter your Password Reset Question and Answer. This can be used at a later time if you forget your password. 8. Enter your e-mail address. You will receive e-mail notification when new information is available in [address] 9. Click Sign Up. You can now view and download portions of your medical record. 10. Click the Download Summary menu link to download a portable copy of your medical information. If you have questions, please visit the Frequently Asked Questions section of the YUPIQ website. Remember, YUPIQ is NOT to be used for urgent needs. For medical emergencies, dial 911. Now available from your iPhone and Android! Please provide this summary of care documentation to your next provider. Your primary care clinician is listed as Maude Worrell. If you have any questions after today's visit, please call [phone].

## 2018-02-20 LAB
ANION GAP SERPL CALC-SCNC: 17 MMOL/L
BUN SERPL-MCNC: 10 MG/DL (ref 6–22)
CALCIUM SERPL-MCNC: 9.3 MG/DL (ref 8.4–10.4)
CHLORIDE SERPL-SCNC: 105 MMOL/L (ref 98–110)
CHOLEST SERPL-MCNC: 170 MG/DL (ref 110–200)
CO2 SERPL-SCNC: 22 MMOL/L (ref 20–32)
CREAT SERPL-MCNC: 0.9 MG/DL (ref 0.8–1.6)
GFRAA, 66117: >60
GFRNA, 66118: >60
GLUCOSE SERPL-MCNC: 100 MG/DL (ref 70–99)
HDLC SERPL-MCNC: 34 MG/DL (ref 40–59)
HDLC SERPL-MCNC: 5 MG/DL (ref 0–5)
LDLC SERPL CALC-MCNC: 93 MG/DL (ref 50–99)
POTASSIUM SERPL-SCNC: 4 MMOL/L (ref 3.5–5.5)
SODIUM SERPL-SCNC: 144 MMOL/L (ref 133–145)
T4 FREE SERPL-MCNC: 1 NG/DL (ref 0.9–1.8)
TRIGL SERPL-MCNC: 218 MG/DL (ref 40–149)
TSH SERPL DL<=0.005 MIU/L-ACNC: 1.34 MCU/ML (ref 0.27–4.2)
VLDLC SERPL CALC-MCNC: 44 MG/DL (ref 8–30)

## 2018-02-21 NOTE — PROGRESS NOTES
Patient called, spoke with wife. She will instruct him to begin low carb diet to lower triglycerides.

## 2018-02-23 DIAGNOSIS — R07.9 CHEST PAIN, UNSPECIFIED TYPE: ICD-10-CM

## 2018-02-23 DIAGNOSIS — R00.2 PALPITATION: ICD-10-CM

## 2018-03-01 ENCOUNTER — CLINICAL SUPPORT (OUTPATIENT)
Dept: CARDIOLOGY CLINIC | Age: 63
End: 2018-03-01

## 2018-03-01 DIAGNOSIS — R07.9 CHEST PAIN, UNSPECIFIED TYPE: Primary | ICD-10-CM

## 2018-03-01 DIAGNOSIS — R00.2 PALPITATION: ICD-10-CM

## 2018-03-01 DIAGNOSIS — I10 ESSENTIAL HYPERTENSION: ICD-10-CM

## 2018-03-01 NOTE — PROGRESS NOTES
[facility]  [address], [address] [place] [address], [place], [address] [place] [address]  [phone] [place]  [phone] [place]       Name: Katina Olguin         MRN#: [id number]        YOB: 1955   Gender: male Ht:5'10\" Wt:185 lbs       . Date of Rest/Stress Images: 3/1/2018   Referring Physician: Paola Muñoz MD  Ordering Physician: Nam Arellano. Song Navarrete MD, [facility]  Technologist: Lynda Quinones. JAIME Storey., C.N.M.T  Diagnosis:  1. Chest pain, unspecified type    2. Palpitation    3. Essential hypertension          Rest/Stress Myoview SPECT Myocardial Perfusion Imaging with  Lexiscan Stress and gated SPECT Imaging      PROCEDURE:      Myocardial perfusion imaging was performed at rest approximately 30 mins following the intravenous injection,(Right hand ) of 12.1 mCi of Tc99m Myoview for evaluation of myocardial function and perfusion at rest.    Baseline Data:    Baseline EKG reveals sinus rhythm, within normal limits. Baseline heart rate is 81. Baseline blood pressure is 124/90. Procedure: The patient was injected with 0.4 mg IV Lexiscan. The patient had abdominal distress and nausea that improved spontaneously. Heart rate increased from baseline to a heart rate of 115. Blood pressure increased slightly to 134/84. Electrocardiogram showed no significant ST-T changes or arrhythmia during the procedure. Diagnosis:   1. Negative EKG portion of Lexiscan stress test.    2. Nuclear imaging report to follow. Pharmacological:  Patient was injected with . 4 mg/mL with Lexiscan intravenously over a period 10 to 20 sec. After pharmacologic stress, the patient was injected intravenously with 37.4 mCi of Tc99m Myoview. Gating post stress tomographic imaging performed approximately 45 minutes post tracer injection.  The data was reconstructed in the short, horizontal long and vertical long axis views and tomographic slices were generated. NUCLEAR IMAGING:    Findings:   1. Stress images reveal normal Myoview distrubution in all the LV segments in short axis, vertical and horizontal long axis views. 2. Resting images have a normal uptake. 3. Gated images reveal normal wall motion and the ejection fraction is calculated to be 71%. Conclusion:   1. Normal perfusion scan. 2. Normal wall motion and ejection fraction is calculated at 71%. 3. No evidence of significant fixed or reversible defect suggesting ischemia or myocardial infarction noted from this nuclear study. 4. Low risk scan. Thank you for the referral.    E-signed and Interpreting Physician:    Yemi Gastelum.  Lino Nix MD, [facility]     Date of interpretation: 3/1/2018  Date of final report: 3/1/2018

## 2018-03-07 ENCOUNTER — OFFICE VISIT (OUTPATIENT)
Dept: ORTHOPEDIC SURGERY | Facility: CLINIC | Age: 63
End: 2018-03-07

## 2018-03-07 VITALS
HEIGHT: 70 IN | SYSTOLIC BLOOD PRESSURE: 121 MMHG | DIASTOLIC BLOOD PRESSURE: 85 MMHG | RESPIRATION RATE: 18 BRPM | OXYGEN SATURATION: 97 % | BODY MASS INDEX: 27.2 KG/M2 | TEMPERATURE: 97.1 F | HEART RATE: 90 BPM | WEIGHT: 190 LBS

## 2018-03-07 DIAGNOSIS — M16.11 PRIMARY OSTEOARTHRITIS OF RIGHT HIP: Primary | ICD-10-CM

## 2018-03-07 DIAGNOSIS — M25.551 RIGHT HIP PAIN: ICD-10-CM

## 2018-03-07 DIAGNOSIS — G89.18 POST-OP PAIN: ICD-10-CM

## 2018-03-07 DIAGNOSIS — Z47.1 AFTERCARE FOLLOWING RIGHT HIP JOINT REPLACEMENT SURGERY: ICD-10-CM

## 2018-03-07 DIAGNOSIS — Z96.641 AFTERCARE FOLLOWING RIGHT HIP JOINT REPLACEMENT SURGERY: ICD-10-CM

## 2018-03-07 RX ORDER — OXYCODONE AND ACETAMINOPHEN 7.5; 325 MG/1; MG/1
1 TABLET ORAL
Qty: 28 TAB | Refills: 0 | Status: SHIPPED | OUTPATIENT
Start: 2018-03-07 | End: 2018-03-27 | Stop reason: SDUPTHER

## 2018-03-07 NOTE — PROGRESS NOTES
HISTORY:  Mr. Waylon De Anda returns just over six weeks status post his primary right total hip replacement under the care of Dr. Zulma Marie. He is doing very well today. Review of a recent physical therapy note from [facility], dated March 5, 2018, finds the patient continuing to improve relative to his strength and range of motion of his right lower extremity. He still has some pain by report when he is involved in internal rotation of his right hip. He denies any catching or giveaway sensation to the right leg. PHYSICAL EXAMINATION: His wound site has healed nicely associated with the proximal anterior right hip. There is no evidence of skin breakdown or infection. Femoral nerve and quad function intact fully, right lower extremity. With the patient sitting at bedside in shorts, with the knee flexed at 90? on the right, his internal rotation noted at 8? with some pain. External rotation 12? without pain. Distal sensation intact fully, right lower extremity. Capillary refill is brisk and less than 2 seconds of the right lower extremity. No evidence of DVT or calf tenderness, right lower extremity. PLAN: We are going to continue physical therapy. We will see him back in about one month. He is going to continue full weightbearing with a cane to assist in ambulation. He was refilled of his Oxycodone in the form of Percocet 7.5 mg today, #28 dispensed. Diagnosis for pain medication postop pain from a right primary total hip replacement. Date of surgery was January 23, 2018.

## 2018-03-07 NOTE — MR AVS SNAPSHOT
[facility] 
 
 
 [address] [facility], [address] [facility] [address] 
[phone] Patient: Joya Theodore MRN: [id number] TNB:[id number]/97/1076 Visit Information Date & Time Provider Department Dept. Phone Encounter #  
 3/7/2018 10:15 AM Marbella Dennis [address] [facility]  Follow-up Instructions Return in about 1 month (around 4/7/2018). Follow-up and Disposition History Your Appointments 3/7/2018 10:15 AM  
Follow Up with Marbella Dennis PA-C  
[facility] [address]) Appt Note: 3 WK FU RT HIP  
 [address] [facility], [address] [facility] [address]  
[phone]  
  
   
 [address] [facility], [address] [place] [id number] 4/17/2018 11:15 AM  
Follow Up with Alba Valencia MD  
[facility] ([address] [facility]) Appt Note: nuc/event bmp/lipid/t4/tsh follow up  
 [address] [facility] [address]  
  
   
 [address] [address] [address] [address] Upcoming Health Maintenance Date Due Hepatitis C Screening 1955 Pneumococcal 19-64 Highest Risk (1 of 3 - PCV13) 8/10/1976 DTaP/Tdap/Td series (1 - Tdap) 8/10/1978 FOBT Q 1 YEAR AGE 50-75 8/10/2007 ZOSTER VACCINE AGE 60> 6/10/2017 Influenza Age 5 to Adult 8/1/2017 Allergies as of 3/7/2018  Review Complete On: 3/7/2018 By: Wolf Smoke Severity Noted Reaction Type Reactions Doxycycline  03/23/2008    Swelling Current Immunizations  Never Reviewed No immunizations on file. Not reviewed this visit You Were Diagnosed With   
  
 Codes Comments Primary osteoarthritis of right hip    -  Primary ICD-10-CM: M16.11 
ICD-9-CM: 715.15 Aftercare following right hip joint replacement surgery     ICD-10-CM: Z47.1, R96.600 ICD-9-CM: V54.81, V43.64 Right hip pain     ICD-10-CM: M25.551 ICD-9-CM: 719.45   
 Post-op pain     ICD-10-CM: G89.18 
ICD-9-CM: 338.18 Vitals BP Pulse Temp Resp Height(growth percentile) Weight(growth percentile) 121/85 90 97.1 °F (36.2 °C) (Oral) 18 5' 10\" (1.778 m) 190 lb (86.2 kg) SpO2 BMI Smoking Status 97% 27.26 kg/m2 Former Smoker BMI and BSA Data Body Mass Index Body Surface Area  
 27.26 kg/m 2 2.06 m 2 Your Updated Medication List  
  
   
This list is accurate as of 3/7/18 10:11 AM.  Always use your most recent med list.  
  
  
  
  
 aspirin delayed-release 81 mg tablet Take  by mouth daily. metoprolol tartrate 50 mg tablet Commonly known as:  LOPRESSOR Take 50 mg by mouth two (2) times a day.  
  
 naloxegol 25 mg Tab tablet Commonly known as:  Vicie Caitlin Take 1 Tab by mouth Daily (before breakfast). Indications: OPIOID-INDUCED CONSTIPATION  
  
 oxyCODONE-acetaminophen 7.5-325 mg per tablet Commonly known as:  PERCOCET Take 1 Tab by mouth every eight (8) hours as needed for Pain. Max Daily Amount: 3 Tabs. TRIUMEQ tablet Generic drug:  abacavir-dolutegravir-lamiVUDine Take 1 Tab by mouth daily. [id number] Prescriptions Printed Refills  
 oxyCODONE-acetaminophen (PERCOCET) 7.5-325 mg per tablet 0 Sig: Take 1 Tab by mouth every eight (8) hours as needed for Pain. Max Daily Amount: 3 Tabs. Class: Print Route: Oral  
  
Follow-up Instructions Return in about 1 month (around 4/7/2018). Introducing [facility]! Carson Jain introduces Melon patient portal. Now you can access parts of your medical record, email your doctor's office, and request medication refills online. 1. In your internet browser, go to [email or web address] 2. Click on the First Time User? Click Here link in the Sign In box. You will see the New Member Sign Up page. 3. Enter your Melon Access Code exactly as it appears below.  You will not need to use this code after youve completed the sign-up process. If you do not sign up before the expiration date, you must request a new code. · [employer] Access Code: [email or web address] Expires: 4/17/2018  9:46 AM 
 
4. Enter the last four digits of your Social Security Number (xxxx) and Date of Birth (mm/dd/yyyy) as indicated and click Submit. You will be taken to the next sign-up page. 5. Create a [employer] ID. This will be your [employer] login ID and cannot be changed, so think of one that is secure and easy to remember. 6. Create a [employer] password. You can change your password at any time. 7. Enter your Password Reset Question and Answer. This can be used at a later time if you forget your password. 8. Enter your e-mail address. You will receive e-mail notification when new information is available in [address] 9. Click Sign Up. You can now view and download portions of your medical record. 10. Click the Download Summary menu link to download a portable copy of your medical information. If you have questions, please visit the Frequently Asked Questions section of the [employer] website. Remember, [employer] is NOT to be used for urgent needs. For medical emergencies, dial 911. Now available from your iPhone and Android! Please provide this summary of care documentation to your next provider. Your primary care clinician is listed as Aydee Morgan. If you have any questions after today's visit, please call [phone].

## 2018-03-09 ENCOUNTER — TELEPHONE (OUTPATIENT)
Dept: ORTHOPEDIC SURGERY | Facility: CLINIC | Age: 63
End: 2018-03-09

## 2018-03-09 NOTE — TELEPHONE ENCOUNTER
Fax sent to the plan  Your PA has been faxed to the plan as a paper copy. Please contact the plan directly if you haven't received a determination in a typical timeframe. You will be notified of the determination via fax. How do I know if the plan approved the PA?     Contact plan to follow up on [facility]

## 2018-03-13 NOTE — TELEPHONE ENCOUNTER
Called and made patient aware of the denial for the Movantik and to take OTC Metamucil instead.  Patient verbalized understanding and thanked writer for the call

## 2018-03-27 DIAGNOSIS — Z47.1 AFTERCARE FOLLOWING RIGHT HIP JOINT REPLACEMENT SURGERY: ICD-10-CM

## 2018-03-27 DIAGNOSIS — G89.18 POST-OP PAIN: ICD-10-CM

## 2018-03-27 DIAGNOSIS — Z96.641 AFTERCARE FOLLOWING RIGHT HIP JOINT REPLACEMENT SURGERY: ICD-10-CM

## 2018-03-27 RX ORDER — OXYCODONE AND ACETAMINOPHEN 7.5; 325 MG/1; MG/1
1 TABLET ORAL
Qty: 28 TAB | Refills: 0 | Status: SHIPPED | OUTPATIENT
Start: 2018-03-27 | End: 2018-03-27 | Stop reason: SDUPTHER

## 2018-04-17 ENCOUNTER — OFFICE VISIT (OUTPATIENT)
Dept: CARDIOLOGY CLINIC | Age: 63
End: 2018-04-17

## 2018-04-17 VITALS
DIASTOLIC BLOOD PRESSURE: 73 MMHG | SYSTOLIC BLOOD PRESSURE: 118 MMHG | HEIGHT: 70 IN | WEIGHT: 195 LBS | BODY MASS INDEX: 27.92 KG/M2 | HEART RATE: 80 BPM

## 2018-04-17 DIAGNOSIS — R00.2 PALPITATION: ICD-10-CM

## 2018-04-17 DIAGNOSIS — I10 ESSENTIAL HYPERTENSION: ICD-10-CM

## 2018-04-17 DIAGNOSIS — R07.9 CHEST PAIN, UNSPECIFIED TYPE: Primary | ICD-10-CM

## 2018-04-17 NOTE — PROGRESS NOTES
1. Have you been to the ER, urgent care clinic since your last visit? Hospitalized since your last visit?     no    2. Have you seen or consulted any other health care providers outside of the [address] [place] [address] since your last visit? Include any pap smears or colon screening. Yes Where: pcp     3. Since your last visit, have you had any of the following symptoms? no       4. Have you had any blood work, X-rays or cardiac testing? no         5. Where do you normally have your labs drawn? 6. Do you need any refills today?    no

## 2018-04-17 NOTE — LETTER
Toño Tang 1955 4/17/2018 Dear Kelsey Perez MD 
 
I had the pleasure of evaluating  Mr. Paola Elizabeth in office today. Below are the relevant portions of my assessment and plan of care. ICD-10-CM ICD-9-CM 1. Chest pain, unspecified type R07.9 786.50   
 stable 
negative stress test 
  
2. Essential hypertension I10 401.9   
 controlled 3. Palpitation R00.2 785.1   
 no significant arrhythmia 
normal tsh Current Outpatient Prescriptions Medication Sig Dispense Refill  oxyCODONE-acetaminophen (PERCOCET) 7.5-325 mg per tablet Take 1 Tab by mouth every eight (8) hours as needed for Pain. Max Daily Amount: 3 Tabs. Indications: Pain 28 Tab 0  
 aspirin delayed-release 81 mg tablet Take  by mouth daily.  metoprolol tartrate (LOPRESSOR) 50 mg tablet Take 50 mg by mouth two (2) times a day.  naloxegol (MOVANTIK) 25 mg tab tablet Take 1 Tab by mouth Daily (before breakfast). Indications: OPIOID-INDUCED CONSTIPATION 30 Tab 0  
 abacavir-dolutegravir-lamiVUDine (TRIUMEQ) tablet Take 1 Tab by mouth daily. [id number] No orders of the defined types were placed in this encounter. If you have questions, please do not hesitate to call me. I look forward to following Mr. Paola Elizabeth along with you. Sincerely, Natalee Saul MD 
 
 
 need for outpatient follow-up/radiology results/return to ED if symptoms worsen, persist or questions arise

## 2018-04-17 NOTE — PROGRESS NOTES
HISTORY OF PRESENT ILLNESS  Fco James is a 63 y.o. male. HPI Comments: Patient is here for follow up of diagnostic tests. Results will be discussed. Palpitations    The history is provided by the patient. This is a new problem. The current episode started more than 1 week ago. The problem has been gradually worsening. The problem occurs every several days. The problem is associated with nothing. Associated symptoms include chest pain. Pertinent negatives include no fever, no claudication, no orthopnea, no PND, no nausea, no vomiting, no dizziness, no weakness, no cough, no hemoptysis and no sputum production. Chest Pain (Angina)    The history is provided by the patient. This is a new problem. The current episode started more than 1 week ago. The problem has been resolved. The problem occurs rarely. The pain is associated with exertion. The pain is present in the substernal region and left side. The pain is mild. The quality of the pain is described as pressure-like. Associated symptoms include palpitations. Pertinent negatives include no claudication, no cough, no dizziness, no fever, no hemoptysis, no nausea, no orthopnea, no PND, no sputum production, no vomiting and no weakness. Review of Systems   Constitutional: Negative for chills and fever. HENT: Negative for nosebleeds. Eyes: Negative for blurred vision and double vision. Respiratory: Negative for cough, hemoptysis, sputum production and wheezing. Cardiovascular: Positive for chest pain and palpitations. Negative for orthopnea, claudication, leg swelling and PND. Gastrointestinal: Negative for heartburn, nausea and vomiting. Musculoskeletal: Negative for myalgias. Skin: Negative for rash. Neurological: Negative for dizziness and weakness. Endo/Heme/Allergies: Does not bruise/bleed easily.      Family History   Problem Relation Age of Onset    No Known Problems Mother     Heart Attack Father     Heart Attack Brother  Heart Attack Paternal Aunt     Heart Attack Maternal Grandmother     Heart Attack Paternal Grandmother        Past Medical History:   Diagnosis Date    Chest pain 2/16/2018    ? angina,chest wall,    Family history of coronary arteriosclerosis 2/16/2018    brother mi age 48    HIV (human immunodeficiency virus infection) ([facility] [address]     Liver disease     Hepatitis C (treated)    Osteoarthritis of right hip 1/23/2018    Palpitation 2/16/2018    recently worse        Past Surgical History:   Procedure Laterality Date    HX HIP REPLACEMENT Right 01/23/2017    NEUROLOGICAL PROCEDURE UNLISTED      neck surgery       Social History   Substance Use Topics    Smoking status: Former Smoker     Types: Cigarettes     Quit date: 2/16/2013    Smokeless tobacco: Never Used    Alcohol use No       Allergies   Allergen Reactions    Doxycycline Swelling       Prior to Admission medications    Medication Sig Start Date End Date Taking? Authorizing Provider   oxyCODONE-acetaminophen (PERCOCET) 7.5-325 mg per tablet Take 1 Tab by mouth every eight (8) hours as needed for Pain. Max Daily Amount: 3 Tabs. Indications: Pain 3/28/18  Yes Jim Franco PA-C   aspirin delayed-release 81 mg tablet Take  by mouth daily. Yes Historical Provider   metoprolol tartrate (LOPRESSOR) 50 mg tablet Take 50 mg by mouth two (2) times a day. 2/6/18  Yes Historical Provider   naloxegol (MOVANTIK) 25 mg tab tablet Take 1 Tab by mouth Daily (before breakfast). Indications: OPIOID-INDUCED CONSTIPATION 1/24/18  Yes Jim Franco PA-C   abacavir-dolutegravir-lamiVUDine (TRIUMEQ) tablet Take 1 Tab by mouth daily. [phone]   Yes Historical Provider         Visit Vitals    /73    Pulse 80    Ht 5' 10\" (1.778 m)    Wt 88.5 kg (195 lb)    BMI 27.98 kg/m2       Physical Exam   Constitutional: He is oriented to person, place, and time. He appears well-developed and well-nourished. HENT:   Head: Normocephalic and atraumatic.    Eyes: Conjunctivae are normal.   Neck: Neck supple. No JVD present. No tracheal deviation present. No thyromegaly present. Cardiovascular: Normal rate, regular rhythm and normal heart sounds. Exam reveals no gallop and no friction rub. No murmur heard. Pulmonary/Chest: Breath sounds normal. No respiratory distress. He has no wheezes. He has no rales. He exhibits no tenderness. Abdominal: Soft. There is no tenderness. Musculoskeletal: He exhibits no edema. Neurological: He is alert and oriented to person, place, and time. Skin: Skin is warm and dry. Psychiatric: He has a normal mood and affect. Mr. Regi Diaz has a reminder for a \"due or due soon\" health maintenance. I have asked that he contact his primary care provider for follow-up on this health maintenance. I have personally reviewed patients ekg done at other facility. I Have personally reviewed recent relevant labs available and discussed with patient    Impression 3/2018     Findings:   1. Stress images reveal normal Myoview distrubution in all the LV segments in short axis, vertical and horizontal long axis views. 2. Resting images have a normal uptake. 3. Gated images reveal normal wall motion and the ejection fraction is calculated to be 71%. Conclusion:   1. Normal perfusion scan. 2. Normal wall motion and ejection fraction is calculated at 71%. 3. No evidence of significant fixed or reversible defect suggesting ischemia or myocardial infarction noted from this nuclear study. 4. Low risk scan. I Have personally reviewed recent relevant labs available and discussed with patient  2/2018  Event monitor  st [place],3-2018    Assessment         ICD-10-CM ICD-9-CM    1. Chest pain, unspecified type R07.9 786.50     stable  negative stress test     2. Essential hypertension I10 401.9     controlled   3. Palpitation R00.2 785.1     no significant arrhythmia  normal tsh       There are no discontinued medications.     No orders of the defined types were placed in this encounter. Follow-up Disposition:  Return if symptoms worsen or fail to improve.

## 2018-04-18 ENCOUNTER — OFFICE VISIT (OUTPATIENT)
Dept: ORTHOPEDIC SURGERY | Facility: CLINIC | Age: 63
End: 2018-04-18

## 2018-04-18 VITALS
TEMPERATURE: 96.8 F | DIASTOLIC BLOOD PRESSURE: 81 MMHG | WEIGHT: 197.8 LBS | HEIGHT: 70 IN | SYSTOLIC BLOOD PRESSURE: 126 MMHG | HEART RATE: 77 BPM | RESPIRATION RATE: 18 BRPM | OXYGEN SATURATION: 98 % | BODY MASS INDEX: 28.32 KG/M2

## 2018-04-18 DIAGNOSIS — Z47.1 AFTERCARE FOLLOWING RIGHT HIP JOINT REPLACEMENT SURGERY: ICD-10-CM

## 2018-04-18 DIAGNOSIS — G89.18 POST-OP PAIN: ICD-10-CM

## 2018-04-18 DIAGNOSIS — Z96.641 AFTERCARE FOLLOWING RIGHT HIP JOINT REPLACEMENT SURGERY: ICD-10-CM

## 2018-04-18 DIAGNOSIS — M25.551 RIGHT HIP PAIN: Primary | ICD-10-CM

## 2018-04-18 DIAGNOSIS — M70.61 TROCHANTERIC BURSITIS OF RIGHT HIP: ICD-10-CM

## 2018-04-18 RX ORDER — OXYCODONE AND ACETAMINOPHEN 7.5; 325 MG/1; MG/1
1 TABLET ORAL
Qty: 28 TAB | Refills: 0 | Status: SHIPPED | OUTPATIENT
Start: 2018-04-18 | End: 2019-08-27

## 2018-04-18 NOTE — LETTER
[facility] - [address] 
[phone] Work/School Note Date: 4/18/2018 To Whom It May concern: 
 
Daniela Richardson was seen and treated today in the orthopaedic clinic. He is cleared to return to work \"full duty\" on 23 April 18.  
 
 
 
 
 
 
Cam Palacio PA-C

## 2018-04-18 NOTE — PROGRESS NOTES
HISTORY:  Mr. ABDI COBB returns just over 11 weeks status post his primary right total hip replacement under the care of Dr. Harman Hawkins. He is doing very well today. Review of a recent physical therapy note from [facility], dated March 12, 2018, finds the patient continuing to improve relative to his strength and range of motion of his right lower extremity. His pain by report when he is involved in internal rotation of his right hip has improved. He denies any catching or giveaway sensation to the right leg. PHYSICAL EXAMINATION: His wound site has healed nicely associated with the proximal anterior right hip. There is no evidence of skin breakdown or infection. Femoral nerve and quad function intact fully, right lower extremity. With the patient sitting at bedside in shorts, with the knee flexed at 90? on the right, his internal rotation noted at 10? with some pain. External rotation 12? without pain. Distal sensation intact fully, right lower extremity. Capillary refill is brisk and less than 2 seconds of the right lower extremity. No evidence of DVT or calf tenderness, right lower extremity. Motor Hip Flexor on right 4-/5. Point tender over greater trochanter with 2cm radiation distal from point of maximal tenderness. PLAN: He is going to continue \"HEP\". We will see him back in 1 year. He is going to continue full weightbearing with a cane to assist in ambulation. He was refilled of his Oxycodone in the form of Percocet 7.5 mg today, #28 dispensed and he is encouraged to taper. Diagnosis for pain medication postop pain from a right primary total hip replacement. Date of surgery was January 23, 2018. Return to work letter provided for April 23, 2018.

## 2018-05-14 DIAGNOSIS — Z96.641 AFTERCARE FOLLOWING RIGHT HIP JOINT REPLACEMENT SURGERY: ICD-10-CM

## 2018-05-14 DIAGNOSIS — Z47.1 AFTERCARE FOLLOWING RIGHT HIP JOINT REPLACEMENT SURGERY: ICD-10-CM

## 2018-05-14 DIAGNOSIS — G89.18 POST-OP PAIN: ICD-10-CM

## 2018-05-14 RX ORDER — OXYCODONE AND ACETAMINOPHEN 7.5; 325 MG/1; MG/1
1 TABLET ORAL
Qty: 28 TAB | Refills: 0 | OUTPATIENT
Start: 2018-05-14

## 2018-05-14 NOTE — TELEPHONE ENCOUNTER
[place] Regulation 18V DY68-86-56 dated March 15, 2017, states, \" acute pain\" medication shall not be prescribed by a medical provider with a short acting opioid for more than 7 days. Chronic pain treatment will require a \"drug contract, urine drug screen,psychological evaluation\". Please refer patient to pain management, and or PCP.

## 2018-05-14 NOTE — TELEPHONE ENCOUNTER
PATIENT CALLED AGAIN REQUESTING A REFILL ON OXYCODONE MEDICATION FROM DR. WILSON. WILL  FROM [facility].    PATIENT TEL. [phone].

## 2018-05-14 NOTE — TELEPHONE ENCOUNTER
Last Visit: 04/18/2018 with PA [address]    Next Appointment: noted to f/u in one year   Previous Refill Encounters: 04/18/2018 per ROSANNA Franco #28    Requested Prescriptions     Pending Prescriptions Disp Refills    oxyCODONE-acetaminophen (PERCOCET) 7.5-325 mg per tablet 28 Tab 0     Sig: Take 1 Tab by mouth every eight (8) hours as needed for Pain. Max Daily Amount: 3 Tabs.  Indications: Pain

## 2018-07-30 NOTE — ANESTHESIA PREPROCEDURE EVALUATION
Anesthetic History               Review of Systems / Medical History  Patient summary reviewed and pertinent labs reviewed    Pulmonary                   Neuro/Psych              Cardiovascular                       GI/Hepatic/Renal           Liver disease     Endo/Other        Arthritis    Comments: HIV Other Findings   Comments: Documentation of current medication  Current medications obtained, documented and obtained? YES      Risk Factors for Postoperative nausea/vomiting:       History of postoperative nausea/vomiting? NO       Female? NO       Motion sickness? NO       Intended opioid administration for postoperative analgesia? YES      Smoking Abstinence:  Current Smoker? NO  Elective Surgery? YES  Seen preoperatively by anesthesiologist or proxy prior to day of surgery? YES  Pt abstained from smoking 24 hours prior to anesthesia?  N/A    Preventive care/screening for High Blood Pressure:  Aged 18 years and older: YES  Screened for high blood pressure: YES  Patients with high blood pressure referred to primary care provider   for BP management: YES               Physical Exam    Airway  Mallampati: III  TM Distance: > 6 cm  Neck ROM: normal range of motion   Mouth opening: Normal     Cardiovascular    Rhythm: regular  Rate: normal         Dental    Dentition: Poor dentition     Pulmonary  Breath sounds clear to auscultation               Abdominal  GI exam deferred       Other Findings            Anesthetic Plan    ASA: 3  Anesthesia type: general      Post-op pain plan if not by surgeon: peripheral nerve block single    Induction: Intravenous  Anesthetic plan and risks discussed with: Patient
Yes

## 2019-04-11 ENCOUNTER — OFFICE VISIT (OUTPATIENT)
Dept: ORTHOPEDIC SURGERY | Facility: CLINIC | Age: 64
End: 2019-04-11

## 2019-04-11 VITALS
DIASTOLIC BLOOD PRESSURE: 66 MMHG | OXYGEN SATURATION: 96 % | HEART RATE: 85 BPM | TEMPERATURE: 98.8 F | SYSTOLIC BLOOD PRESSURE: 102 MMHG | BODY MASS INDEX: 24.54 KG/M2 | RESPIRATION RATE: 18 BRPM | WEIGHT: 171.4 LBS | HEIGHT: 70 IN

## 2019-04-11 DIAGNOSIS — M25.512 BILATERAL SHOULDER PAIN, UNSPECIFIED CHRONICITY: ICD-10-CM

## 2019-04-11 DIAGNOSIS — M25.511 BILATERAL SHOULDER PAIN, UNSPECIFIED CHRONICITY: ICD-10-CM

## 2019-04-11 DIAGNOSIS — M19.012 PRIMARY OSTEOARTHRITIS OF BOTH SHOULDERS: Primary | ICD-10-CM

## 2019-04-11 DIAGNOSIS — M19.011 PRIMARY OSTEOARTHRITIS OF BOTH SHOULDERS: Primary | ICD-10-CM

## 2019-04-11 RX ORDER — BETAMETHASONE SODIUM PHOSPHATE AND BETAMETHASONE ACETATE 3; 3 MG/ML; MG/ML
6 INJECTION, SUSPENSION INTRA-ARTICULAR; INTRALESIONAL; INTRAMUSCULAR; SOFT TISSUE ONCE
Qty: 0.5 ML | Refills: 0
Start: 2019-04-11 | End: 2019-04-11

## 2019-04-11 NOTE — PROGRESS NOTES
Patient: Mj Mijares                MRN: [id number]       SSN: [id number] YOB: 1955        AGE: 66 y.o. SEX: male PCP: Madalyn Baird MD 
04/11/19 Chief Complaint Patient presents with  Shoulder Pain Ari HISTORY:  Mj Mijares is a 66 y.o. male who is seen for bilateral shoulder pain. He has been experiencing increasing shoulder pain for the past 2 years. There is no history of injury. He notes shoulder pain with overhead activities and at night. He is right handed. He has throbbing pain in his shoulders. He notes some radiating pain in his arms to his hands. He reports that he had an MRI of his shoulder from [facility] in [place], [place]. He is s/p cervical disc replacement in 2004 in [place]. He is s/p right JESSICA on 1/23/18 and is doing significantly better. He reports pain for since March 2017 with no history of injury. He was previously treated at [facility] in September 2017 and received an injection with some relief. He states he is in severe pain all day every day. He denies any h/o long-term steroid use. He reports that he relates his onset of AVN to his long term use of anti-viral HIV medications. He reports pain radiating down through his right leg. He states he walks with a severe limp. Pain Assessment  4/11/2019 Location of Pain Shoulder Location Modifiers Left;Right Severity of Pain 9 Quality of Pain Aching Duration of Pain Persistent Frequency of Pain Constant Aggravating Factors Bending;Stretching Limiting Behavior Yes Relieving Factors Rest;Ice;Heat Result of Injury No  
 
Occupation, etc:  Mr. 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 He almost drowned 2 weeks ago while filling a hole and working on a pump. He previously worked as a  for [employer].  He lives in [place] with his wife. Current weight is 189 pounds. He is 5'10\" tall. He is HIV positive as a result of long-term IV drug use. He states that his Hepatitis C in not active. Lab Results Component Value Date/Time Hemoglobin A1c 5.7 (H) 01/17/2018 03:58 PM  
 
Weight Metrics 4/11/2019 4/18/2018 4/17/2018 3/7/2018 2/16/2018 2/14/2018 2/2/2018 Weight 171 lb 6.4 oz 197 lb 12.8 oz 195 lb 190 lb 185 lb 185 lb 188 lb 6.4 oz BMI 24.59 kg/m2 28.38 kg/m2 27.98 kg/m2 27.26 kg/m2 26.54 kg/m2 26.54 kg/m2 27.03 kg/m2 Patient Active Problem List  
Diagnosis Code  Osteoarthritis of right hip M16.11  
 Family history of coronary arteriosclerosis Z82.49  Chest pain R07.9  Palpitation R00.2  Essential hypertension I10 REVIEW OF SYSTEMS: All Below are Negative except: See HPI Constitutional: negative for fever, chills, and weight loss. Cardiovascular: negative for chest pain, claudication, leg swelling, SOB, MCMAHON Gastrointestinal: Negative for pain, N/V/C/D, Blood in stool or urine, dysuria,  hematuria, incontinence, pelvic pain. Musculoskeletal: See HPI Neurological: Negative for dizziness and weakness. Negative for headaches, Visual changes, confusion, seizures Phychiatric/Behavioral: Negative for depression, memory loss, substance  abuse. Extremities: Negative for hair changes, rash, or skin lesion changes. Hematologic: Negative for bleeding problems, bruising, pallor or swollen lymph  nodes Peripheral Vascular: No calf pain, no circulation deficits. Social History Socioeconomic History  Marital status:  Spouse name: Not on file  Number of children: Not on file  Years of education: Not on file  Highest education level: Not on file Occupational History  Not on file Social Needs  Financial resource strain: Not on file  Food insecurity:  
  Worry: Not on file Inability: Not on file  Transportation needs:  
  Medical: Not on file Non-medical: Not on file Tobacco Use  Smoking status: Former Smoker Types: Cigarettes Last attempt to quit: 2013 Years since quittin.1  Smokeless tobacco: Never Used Substance and Sexual Activity  Alcohol use: No  
 Drug use: No  
  Types: Heroin Comment: none since   Sexual activity: Yes  
  Partners: Female Lifestyle  Physical activity:  
  Days per week: Not on file Minutes per session: Not on file  Stress: Not on file Relationships  Social connections:  
  Talks on phone: Not on file Gets together: Not on file Attends Evangelical service: Not on file Active member of club or organization: Not on file Attends meetings of clubs or organizations: Not on file Relationship status: Not on file  Intimate partner violence:  
  Fear of current or ex partner: Not on file Emotionally abused: Not on file Physically abused: Not on file Forced sexual activity: Not on file Other Topics Concern  Not on file Social History Narrative  Not on file Allergies Allergen Reactions  Doxycycline Swelling Current Outpatient Medications Medication Sig  
 aspirin delayed-release 81 mg tablet Take 325 mg by mouth daily. Indications: Thrombosis Prevention after PCI  metoprolol tartrate (LOPRESSOR) 50 mg tablet Take 50 mg by mouth two (2) times a day. Indications: hypertension  abacavir-dolutegravir-lamiVUDine (TRIUMEQ) tablet Take 1 Tab by mouth daily. [id number]  Indications: HIV infection  oxyCODONE-acetaminophen (PERCOCET) 7.5-325 mg per tablet Take 1 Tab by mouth every eight (8) hours as needed for Pain. Max Daily Amount: 3 Tabs. Indications: Pain  
 naloxegol (MOVANTIK) 25 mg tab tablet Take 1 Tab by mouth Daily (before breakfast). Indications: OPIOID-INDUCED CONSTIPATION No current facility-administered medications for this visit. PHYSICAL EXAMINATION: 
Visit Vitals /66 Pulse 85 Temp 98.8 °F (37.1 °C) (Oral) Resp 18 Ht 5' 10\" (1.778 m) Wt 171 lb 6.4 oz (77.7 kg) SpO2 96% BMI 24.59 kg/m² ORTHO EXAMINATION: 
Examination Neck Skin Left anterior cervical  
Tenderness - Tightness - Flexion Decreased 25% Extension Decreased 25% Lateral bend left Normal  
Lateral bend right Normal  
Masses - Biceps reflex Normal  
Triceps reflex Normal  
Brachioradialis reflex Normal  
 
Examination Right shoulder Left shoulder Skin Intact Intact Effusion - - Biceps deformity - - Atrophy - -  
AC joint tenderness - - Acromial tenderness + + Biceps tenderness - - Forward flexion/Elevation  160 Active abduction  160 External rotation ROM 10 10 Internal rotation ROM 85 85 Apprehension - - Impingement - - Drop Arm Test - - Neurovascular Intact Intact Examination Right hip Left hip Skin Intact Intact External Rotation ROM 5 20 Internal Rotation ROM 0 10 Trochanteric tenderness - - Hip flexion contracture 5 degree - Antalgic gait + + Trendelenberg sign + - Lumbar tenderness - - Straight leg raise - - Calf tenderness - - Neurovascular Intact Intact Severe pain with hip rotation Wearing a soft knee sleeve on the right MRI RIGHT HIP 7/21/17 IMPRESSION:  
1. Avascular necrosis of the right femoral head noted although no appreciable subchondral flattening/collapse is seen. There is surrounding reactive marrow edema which extends caudally to the proximal intertrochanteric region of the right hip joint noted. Mild to moderate degenerative changes of the right hip joint noted. 2. Moderate T2 hyperintense soft tissue signal intensite noted about the right hip joint. Nonspecific whether related to reactive effusion or synovitis. 3. A smaller focus of avascular necrosis arises along the anterior superior margin of the contralateral left femoral head.   
4. Mild partial thickness tearing of the left common hamstring tendon origin noted. 5. An intermuscular lipoma measuring 2.4x4.5x8.3 cm arises between the right rectus femoris, tensor fascia starla, and vastus lateralis muscles. RADIOGRAPHS: 
XR YASMINE SHOULDER 4/11/19 HERNESTO IMPRESSION:  Three views - No fractures, severe acromioclavicular narrowing, severe glenohumeral narrowing, large calcific densities. Articulation of humeral head with acromion R>L c/w severe rotator cuff arthropathy XR RIGHT HIP 1/17/18 HERNESTO IMPRESSION:  AP pelvis and two views - No fractures, complete joint space narrowing, + osteophytes present. Evidence of AVN on the right. + subchondral collapse/fragmentation. IMPRESSION:   
  ICD-10-CM ICD-9-CM 1. Primary osteoarthritis of both shoulders M19.011 715.11 betamethasone (CELESTONE SOLUSPAN) 6 mg/mL injection M19.012  BETAMETHASONE ACETATE & SODIUM PHOSPHATE INJECTION 3 MG EA.  
   DRAIN/INJECT LARGE JOINT/BURSA 2. Bilateral shoulder pain, unspecified chronicity M25.511 719.41 AMB POC XRAY, SHOULDER; COMPLETE, 2+  
 M25.512  betamethasone (CELESTONE SOLUSPAN) 6 mg/mL injection BETAMETHASONE ACETATE & SODIUM PHOSPHATE INJECTION 3 MG EA.  
   DRAIN/INJECT LARGE JOINT/BURSA PLAN:  After discussing treatment options, patient's shoulders were injected with 4 cc Marcaine and 1/2 cc Celestone. He will follow up with Dr. Abdirashid Montenegro for orthopedic shoulder surgery consultation. Continue weight loss efforts with a low carb diet.    
 
Scribed by Oc Parmar ([address]) as dictated by Jersey Dean MD

## 2019-06-21 ENCOUNTER — OFFICE VISIT (OUTPATIENT)
Dept: ORTHOPEDIC SURGERY | Facility: CLINIC | Age: 64
End: 2019-06-21

## 2019-06-21 VITALS
DIASTOLIC BLOOD PRESSURE: 80 MMHG | BODY MASS INDEX: 25.88 KG/M2 | HEART RATE: 88 BPM | SYSTOLIC BLOOD PRESSURE: 121 MMHG | RESPIRATION RATE: 18 BRPM | OXYGEN SATURATION: 97 % | TEMPERATURE: 97.7 F | HEIGHT: 70 IN | WEIGHT: 180.8 LBS

## 2019-06-21 DIAGNOSIS — M12.812 ROTATOR CUFF ARTHROPATHY OF BOTH SHOULDERS: ICD-10-CM

## 2019-06-21 DIAGNOSIS — M12.811 ROTATOR CUFF ARTHROPATHY OF BOTH SHOULDERS: ICD-10-CM

## 2019-06-21 DIAGNOSIS — M19.012 PRIMARY OSTEOARTHRITIS OF BOTH SHOULDERS: Primary | ICD-10-CM

## 2019-06-21 DIAGNOSIS — M25.511 CHRONIC PAIN OF BOTH SHOULDERS: ICD-10-CM

## 2019-06-21 DIAGNOSIS — M25.512 CHRONIC PAIN OF BOTH SHOULDERS: ICD-10-CM

## 2019-06-21 DIAGNOSIS — G89.29 CHRONIC PAIN OF BOTH SHOULDERS: ICD-10-CM

## 2019-06-21 DIAGNOSIS — M19.011 PRIMARY OSTEOARTHRITIS OF BOTH SHOULDERS: Primary | ICD-10-CM

## 2019-06-21 RX ORDER — DICLOFENAC SODIUM 10 MG/G
GEL TOPICAL 4 TIMES DAILY
COMMUNITY
End: 2019-08-27

## 2019-06-21 RX ORDER — DICLOFENAC SODIUM 10 MG/G
GEL TOPICAL
Qty: 5 EACH | Refills: 5 | Status: SHIPPED | OUTPATIENT
Start: 2019-06-21

## 2019-06-21 NOTE — PROGRESS NOTES
1. Have you been to the ER, urgent care clinic since your last visit? Hospitalized since your last visit? No    2. Have you seen or consulted any other health care providers outside of the [address] [place] [address] since your last visit? Include any pap smears or colon screening.  No

## 2019-06-21 NOTE — PROGRESS NOTES
Patient: Ann Marie Lcuas                MRN: [id number]       SSN: [id number]  YOB: 1955        AGE: 66 y.o. SEX: male    PCP: Rupinder Zhang MD  06/21/19    Chief Complaint   Patient presents with    Shoulder Pain     bilateral     HISTORY:  Ann Marie Lucas is a 66 y.o. male who is seen for increased bilateral shoulder pain. He has been experiencing increasing shoulder pain for the past 2 years. He states his pain increased recently. He notes shoulder pain with overhead activities and at night. He is right handed. He has throbbing pain in his shoulders. He notes some radiating pain in his arms to his hands. He reports that he had an MRI of his shoulder from [facility] in [place], [place]. He is s/p cervical disc replacement in 2004 in [place]. He has received previous cortisone injections but did not relieve his pain as much as he would like. He is s/p right JESSICA on 1/23/18 and is doing significantly better. He reports pain for since March 2017 with no history of injury. He was previously treated at [facility] [place] in September 2017 and received an injection with some relief. He states he is in severe pain all day every day. He denies any h/o long-term steroid use. He reports that he relates his onset of AVN to his long term use of anti-viral HIV medications. He reports pain radiating down through his right leg. He states he walks with a severe limp. Pain Assessment  6/21/2019   Location of Pain Shoulder   Location Modifiers Left;Right   Severity of Pain -   Quality of Pain Aching;Dull   Duration of Pain Persistent   Frequency of Pain Constant   Aggravating Factors Bending;Stretching;Straightening   Limiting Behavior -   Relieving Factors Heat;Ice   Result of Injury -     Occupation, etc:  Mr. 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 He almost drowned previously while filling a hole and working on a pump. He previously worked as a  for [employer]. He lives in [place] with his wife. Current weight is 180 pounds. He is 5'10\" tall. He is HIV positive as a result of long-term IV drug use. He states that his Hepatitis C in not active. Lab Results   Component Value Date/Time    Hemoglobin A1c 5.7 (H) 01/17/2018 03:58 PM     Weight Metrics 6/21/2019 4/11/2019 4/18/2018 4/17/2018 3/7/2018 2/16/2018 2/14/2018   Weight 180 lb 12.8 oz 171 lb 6.4 oz 197 lb 12.8 oz 195 lb 190 lb 185 lb 185 lb   BMI 25.94 kg/m2 24.59 kg/m2 28.38 kg/m2 27.98 kg/m2 27.26 kg/m2 26.54 kg/m2 26.54 kg/m2       Patient Active Problem List   Diagnosis Code    Osteoarthritis of right hip M16.11    Family history of coronary arteriosclerosis Z82.49    Chest pain R07.9    Palpitation R00.2    Essential hypertension I10     REVIEW OF SYSTEMS: All Below are Negative except: See HPI   Constitutional: negative for fever, chills, and weight loss. Cardiovascular: negative for chest pain, claudication, leg swelling, SOB, MCMAHON   Gastrointestinal: Negative for pain, N/V/C/D, Blood in stool or urine, dysuria,  hematuria, incontinence, pelvic pain. Musculoskeletal: See HPI   Neurological: Negative for dizziness and weakness. Negative for headaches, Visual changes, confusion, seizures   Phychiatric/Behavioral: Negative for depression, memory loss, substance  abuse. Extremities: Negative for hair changes, rash, or skin lesion changes. Hematologic: Negative for bleeding problems, bruising, pallor or swollen lymph  nodes   Peripheral Vascular: No calf pain, no circulation deficits.     Social History     Socioeconomic History    Marital status:      Spouse name: Not on file    Number of children: Not on file    Years of education: Not on file    Highest education level: Not on file   Occupational History    Not on file   Social Needs    Financial resource strain: Not on file    Food insecurity:     Worry: Not on file Inability: Not on file    Transportation needs:     Medical: Not on file     Non-medical: Not on file   Tobacco Use    Smoking status: Former Smoker     Types: Cigarettes     Last attempt to quit: 2013     Years since quittin.3    Smokeless tobacco: Never Used   Substance and Sexual Activity    Alcohol use: No    Drug use: No     Types: Heroin     Comment: none since     Sexual activity: Yes     Partners: Female   Lifestyle    Physical activity:     Days per week: Not on file     Minutes per session: Not on file    Stress: Not on file   Relationships    Social connections:     Talks on phone: Not on file     Gets together: Not on file     Attends Anglican service: Not on file     Active member of club or organization: Not on file     Attends meetings of clubs or organizations: Not on file     Relationship status: Not on file    Intimate partner violence:     Fear of current or ex partner: Not on file     Emotionally abused: Not on file     Physically abused: Not on file     Forced sexual activity: Not on file   Other Topics Concern    Not on file   Social History Narrative    Not on file      Allergies   Allergen Reactions    Doxycycline Swelling      Current Outpatient Medications   Medication Sig    diclofenac (VOLTAREN) 1 % gel Apply  to affected area four (4) times daily.  aspirin delayed-release 81 mg tablet Take 325 mg by mouth daily. Indications: Thrombosis Prevention after PCI    metoprolol tartrate (LOPRESSOR) 50 mg tablet Take 50 mg by mouth two (2) times a day. Indications: hypertension    abacavir-dolutegravir-lamiVUDine (TRIUMEQ) tablet Take 1 Tab by mouth daily. [id number]  Indications: HIV infection    oxyCODONE-acetaminophen (PERCOCET) 7.5-325 mg per tablet Take 1 Tab by mouth every eight (8) hours as needed for Pain. Max Daily Amount: 3 Tabs. Indications: Pain    naloxegol (MOVANTIK) 25 mg tab tablet Take 1 Tab by mouth Daily (before breakfast).  Indications: OPIOID-INDUCED CONSTIPATION     No current facility-administered medications for this visit. PHYSICAL EXAMINATION:  Visit Vitals  /80 (BP 1 Location: Left arm, BP Patient Position: Sitting)   Pulse 88   Temp 97.7 °F (36.5 °C) (Oral)   Resp 18   Ht 5' 10\" (1.778 m)   Wt 180 lb 12.8 oz (82 kg)   SpO2 97% Comment: RA   BMI 25.94 kg/m²      ORTHO EXAMINATION:  Examination Neck   Skin Left anterior cervical   Tenderness -   Tightness -   Flexion Decreased 25%   Extension Decreased 25%   Lateral bend left Normal   Lateral bend right Normal   Masses -   Biceps reflex Normal   Triceps reflex Normal   Brachioradialis reflex Normal     Examination Right shoulder Left shoulder   Skin Intact Intact   Effusion - -   Biceps deformity - -   Atrophy - -   AC joint tenderness - -   Acromial tenderness + +   Biceps tenderness - -   Forward flexion/Elevation  160 with crepitation   Active abduction  160   External rotation ROM 10 10   Internal rotation ROM 85 85   Apprehension - -   Impingement - -   Drop Arm Test - -   Neurovascular Intact Intact     Examination Right hip Left hip   Skin Intact Intact   External Rotation ROM 5 20   Internal Rotation ROM 0 10   Trochanteric tenderness - -   Hip flexion contracture 5 degree -   Antalgic gait + +   Trendelenberg sign + -   Lumbar tenderness - -   Straight leg raise - -   Calf tenderness - -   Neurovascular Intact Intact   Severe pain with hip rotation  Wearing a soft knee sleeve on the right     MRI RIGHT HIP 7/21/17  IMPRESSION:   1. Avascular necrosis of the right femoral head noted although no appreciable subchondral flattening/collapse is seen. There is surrounding reactive marrow edema which extends caudally to the proximal intertrochanteric region of the right hip joint noted. Mild to moderate degenerative changes of the right hip joint noted. 2. Moderate T2 hyperintense soft tissue signal intensite noted about the right hip joint.  Nonspecific whether related to reactive effusion or synovitis. 3. A smaller focus of avascular necrosis arises along the anterior superior margin of the contralateral left femoral head. 4. Mild partial thickness tearing of the left common hamstring tendon origin noted. 5. An intermuscular lipoma measuring 2.4x4.5x8.3 cm arises between the right rectus femoris, tensor fascia starla, and vastus lateralis muscles. RADIOGRAPHS:  XR YASMINE SHOULDER 4/11/19 HERNESTO  IMPRESSION:  Three views - No fractures, severe acromioclavicular narrowing, severe glenohumeral narrowing, large calcific densities. Articulation of humeral head with acromion R>L c/w severe rotator cuff arthropathy     XR RIGHT HIP 1/17/18 HERNESTO  IMPRESSION:  AP pelvis and two views - No fractures, complete joint space narrowing, + osteophytes present. Evidence of AVN on the right. + subchondral collapse/fragmentation. IMPRESSION:      ICD-10-CM ICD-9-CM    1. Primary osteoarthritis of both shoulders M19.011 715.11 diclofenac (VOLTAREN) 1 % gel    M19.012     2. Chronic pain of both shoulders M25.511 719.41 diclofenac (VOLTAREN) 1 % gel    G89.29 338.29     M25.512     3. Rotator cuff arthropathy of both shoulders M12.811 716.81 diclofenac (VOLTAREN) 1 % gel    M12.812       PLAN:  Voltaren gel Rx provided. Continue weight loss efforts with a low carb diet. He will follow up with Dr. Sukumar Patrick for orthopedic shoulder surgery consultation.     Scribed by Odalis Puckett ([address]) as dictated by Arabella Tran MD

## 2021-03-09 RX ORDER — CEFADROXIL 500 MG/1
CAPSULE ORAL
Qty: 5 CAP | Refills: 3 | Status: SHIPPED | OUTPATIENT
Start: 2021-03-09

## 2022-03-18 PROBLEM — I10 ESSENTIAL HYPERTENSION: Status: ACTIVE | Noted: 2018-02-16

## 2022-03-18 PROBLEM — R00.2 PALPITATION: Status: ACTIVE | Noted: 2018-02-16

## 2022-03-19 PROBLEM — R07.9 CHEST PAIN: Status: ACTIVE | Noted: 2018-02-16

## 2022-03-19 PROBLEM — M16.11 OSTEOARTHRITIS OF RIGHT HIP: Status: ACTIVE | Noted: 2018-01-23

## 2022-03-19 PROBLEM — Z82.49 FAMILY HISTORY OF CORONARY ARTERIOSCLEROSIS: Status: ACTIVE | Noted: 2018-02-16

## 2022-11-11 ENCOUNTER — DOCUMENTATION ONLY (OUTPATIENT)
Dept: ORTHOPEDIC SURGERY | Age: 67
End: 2022-11-11

## 2022-11-11 NOTE — PROGRESS NOTES
Form received from [facility] for Medical Clearance For Dental Treatment via Fax at our [address] [place] [address] location. Blank form scanned into patient's chart. Form placed in forms bin at [address] [place] [address] location for completion.

## 2022-11-15 ENCOUNTER — DOCUMENTATION ONLY (OUTPATIENT)
Dept: ORTHOPEDIC SURGERY | Age: 67
End: 2022-11-15

## 2023-10-10 PROBLEM — G03.9 MENINGITIS: Status: ACTIVE | Noted: 2022-08-23

## 2023-10-10 PROBLEM — K21.9 GASTROESOPHAGEAL REFLUX DISEASE WITHOUT ESOPHAGITIS: Status: ACTIVE | Noted: 2022-08-23

## 2023-10-25 ENCOUNTER — OFFICE VISIT (OUTPATIENT)
Age: 68
End: 2023-10-25
Payer: MEDICAID

## 2023-10-25 VITALS — WEIGHT: 214 LBS | TEMPERATURE: 97.1 F

## 2023-10-25 DIAGNOSIS — M12.812 ROTATOR CUFF ARTHROPATHY OF BOTH SHOULDERS: ICD-10-CM

## 2023-10-25 DIAGNOSIS — M75.100 TEAR OF ROTATOR CUFF, UNSPECIFIED LATERALITY, UNSPECIFIED TEAR EXTENT, UNSPECIFIED WHETHER TRAUMATIC: ICD-10-CM

## 2023-10-25 DIAGNOSIS — B18.2 CHRONIC HEPATITIS C WITHOUT HEPATIC COMA (HCC): ICD-10-CM

## 2023-10-25 DIAGNOSIS — B20 HIV INFECTION, UNSPECIFIED SYMPTOM STATUS (HCC): Primary | ICD-10-CM

## 2023-10-25 DIAGNOSIS — M12.811 ROTATOR CUFF ARTHROPATHY OF BOTH SHOULDERS: ICD-10-CM

## 2023-10-25 PROCEDURE — 1123F ACP DISCUSS/DSCN MKR DOCD: CPT | Performed by: SPECIALIST

## 2023-10-25 PROCEDURE — 99203 OFFICE O/P NEW LOW 30 MIN: CPT | Performed by: SPECIALIST

## 2023-10-25 RX ORDER — FLUTICASONE PROPIONATE 50 MCG
SPRAY, SUSPENSION (ML) NASAL
COMMUNITY

## 2023-10-25 RX ORDER — PREDNISONE 20 MG/1
TABLET ORAL
COMMUNITY
Start: 2023-06-13

## 2023-10-25 RX ORDER — PNEUMOCOCCAL VACCINE POLYVALENT 25; 25; 25; 25; 25; 25; 25; 25; 25; 25; 25; 25; 25; 25; 25; 25; 25; 25; 25; 25; 25; 25; 25 UG/.5ML; UG/.5ML; UG/.5ML; UG/.5ML; UG/.5ML; UG/.5ML; UG/.5ML; UG/.5ML; UG/.5ML; UG/.5ML; UG/.5ML; UG/.5ML; UG/.5ML; UG/.5ML; UG/.5ML; UG/.5ML; UG/.5ML; UG/.5ML; UG/.5ML; UG/.5ML; UG/.5ML; UG/.5ML; UG/.5ML
INJECTION, SOLUTION INTRAMUSCULAR; SUBCUTANEOUS
COMMUNITY
Start: 2019-06-01

## 2023-10-25 RX ORDER — FLUTICASONE PROPIONATE 50 MCG
SPRAY, SUSPENSION (ML) NASAL
COMMUNITY
Start: 2023-10-18

## 2023-10-25 RX ORDER — SILDENAFIL 100 MG/1
TABLET, FILM COATED ORAL
COMMUNITY

## 2023-10-25 RX ORDER — TIOTROPIUM BROMIDE 18 UG/1
CAPSULE ORAL; RESPIRATORY (INHALATION)
COMMUNITY
Start: 2023-10-06

## 2023-10-25 RX ORDER — DICYCLOMINE HCL 20 MG
TABLET ORAL
COMMUNITY

## 2023-10-25 RX ORDER — AZELASTINE HYDROCHLORIDE 137 UG/1
SPRAY, METERED NASAL
COMMUNITY
Start: 2023-10-23

## 2023-10-25 RX ORDER — OXYCODONE AND ACETAMINOPHEN 7.5; 325 MG/1; MG/1
TABLET ORAL
COMMUNITY
Start: 2023-10-05

## 2023-10-25 RX ORDER — METHOCARBAMOL 500 MG/1
TABLET, FILM COATED ORAL
COMMUNITY

## 2023-10-25 RX ORDER — ONDANSETRON 4 MG/1
TABLET, ORALLY DISINTEGRATING ORAL
COMMUNITY

## 2023-10-25 NOTE — PROGRESS NOTES
Patient: Isaiah Claudio                MRN: [id number]       SSN: [id number]  YOB: 1955        AGE: 79 y.o. SEX: male      PCP: Hien Perez MD  10/25/23    Chief Complaint   Patient presents with    Shoulder Pain     BIlateral     HISTORY:  Isaiah Claudio is a 79 y.o. male who is seen for bilateral shoulder pain. He has throbbing pain in his shoulders. He notes some radiating  pain in his arms to his hands. He reports that he had an MRI of his shoulder from [facility] in [place], [place]. He is s/p cervical disc replacement in 2004 in [facility]. He has received previous cortisone injections but did not relieve his pain  as much as he would like. He has to work part time to pay his bills but he is not able to work everyday due to the pain. He is right handed. He is s/p right OSCAR on 1/23/18--doing well. He relates his AVN to long term use of anti-viral HIV medications. Occupation, etc: Mr. Yonathan Mohr is retired but he works part time to pay his bills. 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 He almost drowned previously while filling a hole and working on a pump. He previously worked as a   for [employer]. He lives in [place] with his wife. Current weight is 212 pounds. He is 5'10\" tall. He is HIV positive as a result of long-term IV drug use. He states that his Hepatitis C in not active. He has a rat terrier named Johann Dave. Wt Readings from Last 3 Encounters:   10/25/23 97.1 kg (214 lb)      There is no height or weight on file to calculate BMI.     Patient Active Problem List   Diagnosis    Essential hypertension    Palpitation    Family history of coronary arteriosclerosis    Chest pain    Osteoarthritis of right hip    Fever    Gastroesophageal reflux disease without esophagitis    Meningitis    PNA (pneumonia)       Social History     Tobacco Use    Smoking status: Former     Types: Cigarettes

## 2023-11-08 ENCOUNTER — TELEPHONE (OUTPATIENT)
Age: 68
End: 2023-11-08

## 2023-11-08 RX ORDER — CEFADROXIL 500 MG/1
CAPSULE ORAL
Qty: 20 CAPSULE | Refills: 0 | Status: SHIPPED | OUTPATIENT
Start: 2023-11-08

## 2023-11-08 NOTE — TELEPHONE ENCOUNTER
Patient states he has a dental appt scheduled for 11/9/23, and is asking for a pre med to be sent to [facility] on [address] in [place].    Patient can be reached at [phone]

## 2023-12-01 ENCOUNTER — OFFICE VISIT (OUTPATIENT)
Age: 68
End: 2023-12-01

## 2023-12-01 VITALS — BODY MASS INDEX: 30.71 KG/M2 | HEIGHT: 70 IN | RESPIRATION RATE: 18 BRPM

## 2023-12-01 DIAGNOSIS — M12.812 LEFT ROTATOR CUFF TEAR ARTHROPATHY: ICD-10-CM

## 2023-12-01 DIAGNOSIS — M25.512 CHRONIC PAIN OF BOTH SHOULDERS: ICD-10-CM

## 2023-12-01 DIAGNOSIS — M75.102 LEFT ROTATOR CUFF TEAR ARTHROPATHY: ICD-10-CM

## 2023-12-01 DIAGNOSIS — M12.811 ROTATOR CUFF ARTHROPATHY OF RIGHT SHOULDER: Primary | ICD-10-CM

## 2023-12-01 DIAGNOSIS — M25.511 CHRONIC PAIN OF BOTH SHOULDERS: ICD-10-CM

## 2023-12-01 DIAGNOSIS — G89.29 CHRONIC PAIN OF BOTH SHOULDERS: ICD-10-CM

## 2023-12-01 NOTE — PROGRESS NOTES
fluticasone (FLONASE) 50 MCG/ACT nasal spray       methocarbamol (ROBAXIN) 500 MG tablet take 1 tablet by mouth every 12 hours if needed Oral for 30      ondansetron (ZOFRAN-ODT) 4 MG disintegrating tablet dissolve 1 tablet ON TONGUE every 8 hours if needed for nausea and vomiting Oral for 4      oxyCODONE-acetaminophen (PERCOCET) 7.5-325 MG per tablet take 1 tablet by mouth every 6 hours if needed for MODERATE pain      predniSONE (DELTASONE) 20 MG tablet take 3 tabs x 3d, then 2 tabs x 3d, then 1 tab x 3d, then 1/2 tab x 4d, then stop Orally Once a day for 13 days      pneumococcal polyvalent (PNEUMOVAX 23) 25 MCG/0.5ML inj intramuscular Injection once in office now for 1 dose      sildenafil (VIAGRA) 100 MG tablet TAKE ONE TABLET BY MOUTH DAILY AS NEEDED for 30      SPIRIVA HANDIHALER 18 MCG inhalation capsule inhale THE CONTENTS OF ONE CAPSULE IN THE HANDIHALER once daily      abacavir-dolutegravir-lamivudine 600- MG TABS Take 1 tablet by mouth daily      albuterol sulfate HFA (PROVENTIL;VENTOLIN;PROAIR) 108 (90 Base) MCG/ACT inhaler Inhale 2 puffs into the lungs      aspirin 81 MG EC tablet Take 1 tablet by mouth daily      bictegravir-emtricitab-tenofovir alafenamide (BIKTARVY) -25 MG TABS per tablet Take 1 tablet by mouth daily      cefadroxil (DURICEF) 500 MG capsule Take 3 capsules by mouth at bedtime prior to appt, and take 2 caps by mouth 1 hour prior to appt      diclofenac sodium (VOLTAREN) 1 % GEL Apply 1 Application topically 4 times daily as needed      cetirizine (ZYRTEC) 10 MG tablet Take 1 tablet by mouth daily      famotidine (PEPCID) 40 MG tablet Take 1 tablet by mouth daily      gabapentin (NEURONTIN) 300 MG capsule Take 1 capsule by mouth 2 times daily.       metoprolol tartrate (LOPRESSOR) 25 MG tablet Take 1 tablet by mouth 2 times daily      montelukast (SINGULAIR) 10 MG tablet Take 1 tablet by mouth      naloxegol (MOVANTIK) 25 MG TABS tablet Take 1 tablet by mouth every morning

## 2023-12-27 ENCOUNTER — OFFICE VISIT (OUTPATIENT)
Age: 68
End: 2023-12-27
Payer: MEDICAID

## 2023-12-27 VITALS — HEIGHT: 70 IN | BODY MASS INDEX: 30.71 KG/M2

## 2023-12-27 DIAGNOSIS — M12.811 ROTATOR CUFF ARTHROPATHY OF RIGHT SHOULDER: Primary | ICD-10-CM

## 2023-12-27 PROCEDURE — 99214 OFFICE O/P EST MOD 30 MIN: CPT | Performed by: ORTHOPAEDIC SURGERY

## 2023-12-27 PROCEDURE — 1123F ACP DISCUSS/DSCN MKR DOCD: CPT | Performed by: ORTHOPAEDIC SURGERY

## 2023-12-27 NOTE — PROGRESS NOTES
+  Crepitus                       +                                  -  Effusion                       -                                   -  Warmth                       -                                   -             Erythema                     -                                   -  Instability                     -                                   -  AC Joint TTP               -                                   -  Clavicle              Deformity         -                                   -              TTP                 -                                   -  Proximal Humerus              Deformity         -                                   -              TTP                 -                                   -  Deltoid Strength          5                                  5  Biceps Strength          5                                  5  Biceps Deformity         -                                   -  Biceps Groove Pain    -                                   -  Impingement Sign       -                                   -             Pseudoparalysis both shoulders     IMAGING:  Imaging read by myself and interpreted as follows:  4 view x-rays of the right shoulder taken in the office today show rotator cuff arthropathy with a high riding humeral head glenohumeral arthritis. 4 view x-rays of the left shoulder taken the office today show rotator cuff arthropathy with a high riding humeral head and glenohumeral arthritis     CT scan of the right shoulder was reviewed which shows rotator cuff arthropathy. ASSESSMENT & PLAN  Diagnosis: Right shoulder rotator cuff arthropathy    Sofi Hyatt has symptomatic right shoulder rotator cuff arthropathy. CT scan is completed. He would like to move forward with surgery. Will start the process of getting that set up for him in the near future. Risk factors for surgery are history of cardiovascular disease.     Prescription medication management

## 2024-01-03 ENCOUNTER — TELEPHONE (OUTPATIENT)
Age: 69
End: 2024-01-03

## 2024-01-04 ENCOUNTER — TELEPHONE (OUTPATIENT)
Age: 69
End: 2024-01-04

## 2024-01-04 NOTE — TELEPHONE ENCOUNTER
Patient called again requesting a call back, he is ready to schedule an appointment to have surgery done on his R Shoulder

## 2024-01-10 RX ORDER — NALOXONE HYDROCHLORIDE 4 MG/.1ML
1 SPRAY NASAL PRN
COMMUNITY
Start: 2023-12-06

## 2024-01-16 ENCOUNTER — PROCEDURE VISIT (OUTPATIENT)
Age: 69
End: 2024-01-16
Payer: MEDICAID

## 2024-01-16 VITALS — WEIGHT: 212 LBS | HEIGHT: 70 IN | BODY MASS INDEX: 30.35 KG/M2

## 2024-01-16 DIAGNOSIS — R06.02 SHORTNESS OF BREATH: Primary | ICD-10-CM

## 2024-01-16 PROCEDURE — 94727 GAS DIL/WSHOT DETER LNG VOL: CPT | Performed by: INTERNAL MEDICINE

## 2024-01-16 PROCEDURE — 94060 EVALUATION OF WHEEZING: CPT | Performed by: INTERNAL MEDICINE

## 2024-01-16 PROCEDURE — 94729 DIFFUSING CAPACITY: CPT | Performed by: INTERNAL MEDICINE

## 2024-01-22 ENCOUNTER — HOSPITAL ENCOUNTER (OUTPATIENT)
Facility: HOSPITAL | Age: 69
Discharge: HOME | End: 2024-01-25
Payer: MEDICAID

## 2024-01-22 DIAGNOSIS — M12.811 ROTATOR CUFF ARTHROPATHY OF RIGHT SHOULDER: Primary | ICD-10-CM

## 2024-01-22 DIAGNOSIS — Z01.810 PREOP CARDIOVASCULAR EXAM: ICD-10-CM

## 2024-01-22 DIAGNOSIS — Z01.811 PRE-OP CHEST EXAM: ICD-10-CM

## 2024-01-22 DIAGNOSIS — Z01.818 PREOPERATIVE TESTING: ICD-10-CM

## 2024-01-22 DIAGNOSIS — M12.811 ROTATOR CUFF ARTHROPATHY OF RIGHT SHOULDER: ICD-10-CM

## 2024-01-22 LAB
ALBUMIN SERPL-MCNC: 3.7 G/DL (ref 3.4–5)
ALBUMIN/GLOB SERPL: 1.2 (ref 0.8–1.7)
ALP SERPL-CCNC: 95 U/L (ref 45–117)
ALT SERPL-CCNC: 52 U/L (ref 16–61)
ANION GAP SERPL CALC-SCNC: 3 MMOL/L (ref 3–18)
APPEARANCE UR: CLEAR
APTT PPP: 30.5 SEC (ref 23–36.4)
AST SERPL-CCNC: 24 U/L (ref 10–38)
BACTERIA URNS QL MICRO: NEGATIVE /HPF
BASOPHILS # BLD: 0 K/UL (ref 0–0.1)
BASOPHILS NFR BLD: 0 % (ref 0–2)
BILIRUB SERPL-MCNC: 0.4 MG/DL (ref 0.2–1)
BILIRUB UR QL: NEGATIVE
BUN SERPL-MCNC: 16 MG/DL (ref 7–18)
BUN/CREAT SERPL: 15 (ref 12–20)
CALCIUM SERPL-MCNC: 8.9 MG/DL (ref 8.5–10.1)
CHLORIDE SERPL-SCNC: 113 MMOL/L (ref 100–111)
CO2 SERPL-SCNC: 25 MMOL/L (ref 21–32)
COLOR UR: YELLOW
CREAT SERPL-MCNC: 1.04 MG/DL (ref 0.6–1.3)
DIFFERENTIAL METHOD BLD: ABNORMAL
EOSINOPHIL # BLD: 0.4 K/UL (ref 0–0.4)
EOSINOPHIL NFR BLD: 5 % (ref 0–5)
EPITH CASTS URNS QL MICRO: NEGATIVE /LPF (ref 0–5)
ERYTHROCYTE [DISTWIDTH] IN BLOOD BY AUTOMATED COUNT: 12.2 % (ref 11.6–14.5)
GLOBULIN SER CALC-MCNC: 3.1 G/DL (ref 2–4)
GLUCOSE SERPL-MCNC: 121 MG/DL (ref 74–99)
GLUCOSE UR STRIP.AUTO-MCNC: NEGATIVE MG/DL
HCT VFR BLD AUTO: 40.3 % (ref 36–48)
HGB BLD-MCNC: 13.7 G/DL (ref 13–16)
HGB UR QL STRIP: NEGATIVE
IMM GRANULOCYTES # BLD AUTO: 0 K/UL (ref 0–0.04)
IMM GRANULOCYTES NFR BLD AUTO: 0 % (ref 0–0.5)
INR PPP: 1.2 (ref 0.9–1.1)
KETONES UR QL STRIP.AUTO: NEGATIVE MG/DL
LEUKOCYTE ESTERASE UR QL STRIP.AUTO: NEGATIVE
LYMPHOCYTES # BLD: 4.1 K/UL (ref 0.9–3.6)
LYMPHOCYTES NFR BLD: 52 % (ref 21–52)
MCH RBC QN AUTO: 31 PG (ref 24–34)
MCHC RBC AUTO-ENTMCNC: 34 G/DL (ref 31–37)
MCV RBC AUTO: 91.2 FL (ref 78–100)
MONOCYTES # BLD: 0.7 K/UL (ref 0.05–1.2)
MONOCYTES NFR BLD: 10 % (ref 3–10)
NEUTS SEG # BLD: 2.5 K/UL (ref 1.8–8)
NEUTS SEG NFR BLD: 33 % (ref 40–73)
NITRITE UR QL STRIP.AUTO: NEGATIVE
NRBC # BLD: 0 K/UL (ref 0–0.01)
NRBC BLD-RTO: 0 PER 100 WBC
PH UR STRIP: 6 (ref 5–8)
PLATELET # BLD AUTO: 191 K/UL (ref 135–420)
PMV BLD AUTO: 11.6 FL (ref 9.2–11.8)
POTASSIUM SERPL-SCNC: 3.8 MMOL/L (ref 3.5–5.5)
PROT SERPL-MCNC: 6.8 G/DL (ref 6.4–8.2)
PROT UR STRIP-MCNC: NEGATIVE MG/DL
PROTHROMBIN TIME: 15.2 SEC (ref 11.9–14.7)
RBC # BLD AUTO: 4.42 M/UL (ref 4.35–5.65)
RBC #/AREA URNS HPF: NEGATIVE /HPF (ref 0–5)
SODIUM SERPL-SCNC: 141 MMOL/L (ref 136–145)
SP GR UR REFRACTOMETRY: 1.03 (ref 1–1.03)
UROBILINOGEN UR QL STRIP.AUTO: 1 EU/DL (ref 0.2–1)
WBC # BLD AUTO: 7.8 K/UL (ref 4.6–13.2)
WBC URNS QL MICRO: NEGATIVE /HPF (ref 0–4)

## 2024-01-22 PROCEDURE — 81001 URINALYSIS AUTO W/SCOPE: CPT

## 2024-01-22 PROCEDURE — 93005 ELECTROCARDIOGRAM TRACING: CPT | Performed by: ORTHOPAEDIC SURGERY

## 2024-01-22 PROCEDURE — 85610 PROTHROMBIN TIME: CPT

## 2024-01-22 PROCEDURE — 85025 COMPLETE CBC W/AUTO DIFF WBC: CPT

## 2024-01-22 PROCEDURE — 85730 THROMBOPLASTIN TIME PARTIAL: CPT

## 2024-01-22 PROCEDURE — 71046 X-RAY EXAM CHEST 2 VIEWS: CPT

## 2024-01-22 PROCEDURE — 36415 COLL VENOUS BLD VENIPUNCTURE: CPT

## 2024-01-22 PROCEDURE — 80053 COMPREHEN METABOLIC PANEL: CPT

## 2024-01-22 NOTE — PERIOP NOTE
Instructions for your surgery at [facility]      Today's Date:  1/22/2024      Patient's Name:  William Lawrence           Surgery Date:  02/05/2024              Please enter the main entrance of the hospital and check-in at the  located in the lobby. Once checked in at the , you will take the elevators to the second floor, and report to the waiting room on the left. The room will say Procedure Registration.    Do NOT eat or drink anything, including candy, gum, or ice chips after midnight prior to your surgery, unless you have specific instructions from your surgeon or anesthesia provider to do so.  Brush your teeth before coming to the hospital. You may swish with water, but do not swallow.  No smoking/Vaping/E-Cigarettes 24 hours prior to the day of surgery.  No alcohol 24 hours prior to the day of surgery.  No recreational drugs for one week prior to the day of surgery.  Bring Photo ID, Insurance information, and Co-pay if required on day of surgery.  Bring in pertinent legal documents, such as, Medical Power of , DNR, Advance Directive, etc.  Leave all valuables, including money/purse, at home.  Remove all jewelry, including ALL body piercings, nail polish, acrylic nails, and makeup (including mascara); no lotions, powders, deodorant, or perfume/cologne/after shave on the skin.  Follow instruction for Hibiclens washes and CHG wipes from surgeon's office.   Glasses and dentures may be worn to the hospital. They must be removed prior to surgery. Please bring case/container for glasses or dentures.   Contact lenses should not be worn on day of surgery.   Call your doctor's office if symptoms of a cold or illness develop within 24-48 hours prior to your surgery.  Call your doctor's office if you have any questions concerning insurance or co-pays.  15. AN ADULT (relative or friend 18 years or older) MUST DRIVE YOU HOME AFTER YOUR SURGERY.  16. Please make

## 2024-01-23 LAB
EKG ATRIAL RATE: 80 BPM
EKG DIAGNOSIS: NORMAL
EKG P AXIS: 42 DEGREES
EKG P-R INTERVAL: 156 MS
EKG Q-T INTERVAL: 376 MS
EKG QRS DURATION: 82 MS
EKG QTC CALCULATION (BAZETT): 433 MS
EKG R AXIS: 52 DEGREES
EKG T AXIS: 41 DEGREES
EKG VENTRICULAR RATE: 80 BPM

## 2024-01-24 ENCOUNTER — OFFICE VISIT (OUTPATIENT)
Age: 69
End: 2024-01-24

## 2024-01-24 VITALS
DIASTOLIC BLOOD PRESSURE: 90 MMHG | SYSTOLIC BLOOD PRESSURE: 137 MMHG | HEART RATE: 76 BPM | WEIGHT: 212 LBS | HEIGHT: 70 IN | RESPIRATION RATE: 16 BRPM | BODY MASS INDEX: 30.35 KG/M2

## 2024-01-24 DIAGNOSIS — M12.811 ROTATOR CUFF ARTHROPATHY OF RIGHT SHOULDER: Primary | ICD-10-CM

## 2024-01-24 PROCEDURE — 99024 POSTOP FOLLOW-UP VISIT: CPT | Performed by: ORTHOPAEDIC SURGERY

## 2024-01-24 RX ORDER — OXYCODONE HYDROCHLORIDE AND ACETAMINOPHEN 5; 325 MG/1; MG/1
1-2 TABLET ORAL EVERY 4 HOURS PRN
Qty: 35 TABLET | Refills: 0 | Status: SHIPPED | OUTPATIENT
Start: 2024-01-24 | End: 2024-01-31

## 2024-01-24 NOTE — PROGRESS NOTES
Not on file   Social History Narrative    Not on file     Social Determinants of Health     Financial Resource Strain: Not on file   Food Insecurity: Not on file   Transportation Needs: Not on file   Physical Activity: Not on file   Stress: Not on file   Social Connections: Not on file   Intimate Partner Violence: Not on file   Housing Stability: Not on file       REVIEW OF SYSTEMS:    14 point review of systems on the intake form is negative except as noted in the HPI    PHYSICAL EXAMINATION:  BP (!) 137/90 (Site: Right Upper Arm, Position: Sitting)   Pulse 76   Resp 16   Ht 1.778 m (5' 10\")   Wt 96.2 kg (212 lb)   BMI 30.42 kg/m²   Body mass index is 30.42 kg/m².    GENERAL: Alert and oriented x3, in no acute distress, well-developed, well-nourished.  HEENT: Normocephalic, atraumatic.    ROM              FF                    Full                              Full  ER                   Full                              Full              IR                     Full                              Full  Rotator Cuff Pain/Weakness              Supra               +                                  +              Infra                 +                                  +              Subscap          +                                  +  Crepitus                       +                                  -  Effusion                       -                                   -  Warmth                       -                                   -             Erythema                     -                                   -  Instability                     -                                   -  AC Joint TTP               -                                   -  Clavicle              Deformity         -                                   -              TTP                 -                                   -  Proximal Humerus              Deformity         -                                   -              TTP

## 2024-01-25 ENCOUNTER — OFFICE VISIT (OUTPATIENT)
Age: 69
End: 2024-01-25
Payer: MEDICAID

## 2024-01-25 VITALS
HEART RATE: 77 BPM | DIASTOLIC BLOOD PRESSURE: 96 MMHG | SYSTOLIC BLOOD PRESSURE: 147 MMHG | OXYGEN SATURATION: 95 % | BODY MASS INDEX: 30.49 KG/M2 | TEMPERATURE: 97.6 F | HEIGHT: 70 IN | WEIGHT: 213 LBS | RESPIRATION RATE: 18 BRPM

## 2024-01-25 DIAGNOSIS — Z77.090 ASBESTOS EXPOSURE: ICD-10-CM

## 2024-01-25 DIAGNOSIS — Z87.891 PERSONAL HISTORY OF TOBACCO USE, PRESENTING HAZARDS TO HEALTH: ICD-10-CM

## 2024-01-25 DIAGNOSIS — R05.3 CHRONIC COUGH: Primary | ICD-10-CM

## 2024-01-25 DIAGNOSIS — J45.40 MODERATE PERSISTENT REACTIVE AIRWAY DISEASE WITHOUT COMPLICATION: ICD-10-CM

## 2024-01-25 DIAGNOSIS — R09.82 POST-NASAL DRIP: ICD-10-CM

## 2024-01-25 DIAGNOSIS — T78.40XS ALLERGY, SEQUELA: ICD-10-CM

## 2024-01-25 PROCEDURE — 1123F ACP DISCUSS/DSCN MKR DOCD: CPT | Performed by: INTERNAL MEDICINE

## 2024-01-25 PROCEDURE — 99204 OFFICE O/P NEW MOD 45 MIN: CPT | Performed by: INTERNAL MEDICINE

## 2024-01-25 PROCEDURE — 3077F SYST BP >= 140 MM HG: CPT | Performed by: INTERNAL MEDICINE

## 2024-01-25 PROCEDURE — 3080F DIAST BP >= 90 MM HG: CPT | Performed by: INTERNAL MEDICINE

## 2024-01-25 RX ORDER — FLUTICASONE FUROATE, UMECLIDINIUM BROMIDE AND VILANTEROL TRIFENATATE 200; 62.5; 25 UG/1; UG/1; UG/1
1 POWDER RESPIRATORY (INHALATION) DAILY
Qty: 3 EACH | Refills: 3 | Status: SHIPPED | OUTPATIENT
Start: 2024-01-25

## 2024-01-25 ASSESSMENT — PATIENT HEALTH QUESTIONNAIRE - PHQ9
1. LITTLE INTEREST OR PLEASURE IN DOING THINGS: 0
2. FEELING DOWN, DEPRESSED OR HOPELESS: 0
SUM OF ALL RESPONSES TO PHQ QUESTIONS 1-9: 0
SUM OF ALL RESPONSES TO PHQ9 QUESTIONS 1 & 2: 0

## 2024-01-25 NOTE — PROGRESS NOTES
William Lawrence presents today for   Chief Complaint   Patient presents with    New Patient     Referred by EDINSON Rubi for SOB and exposure to asbestos    Results     CXR 9/15/2023 ([facility])       Is someone accompanying this pt? Yes. Family member    Is the patient using any DME equipment during OV? No    -DME Company N/A    Depression Screenin/25/2024    10:52 AM   PHQ-9 Questionaire   Little interest or pleasure in doing things 0   Feeling down, depressed, or hopeless 0   PHQ-9 Total Score 0       Learning Needs Questionnaire:     Who is the primary learner? Patient    What is the preferred language for health care of the primary learner? ENGLISH    How does the primary learner prefer to learn new concepts? DEMONSTRATION    Answered By Patient    Relationship to Learner SELF          Fall Risk:         2024    10:52 AM   Fall Risk   2 or more falls in past year? no   Fall with injury in past year? no        Abuse Screenin/25/2024    10:00 AM   AMB Abuse Screening   Do you ever feel afraid of your partner? N   Are you in a relationship with someone who physically or mentally threatens you? N   Is it safe for you to go home? Y         Coordination of Care:    1. Have you been to the ER, urgent care clinic since your last visit? Hospitalized since your last visit? Yes. [facility] ED-2023-left hand laceration and 2023-encounter for removal of sutures    2. Have you seen or consulted any other health care providers outside of the [facility] since your last visit? Include any pap smears or colon screening. Yes. Dr. Eileen Giron, PCP, NP Coco Rubi, referring provider    Medication list has been update per patient.

## 2024-01-25 NOTE — PROGRESS NOTES
[address]  [address]  [place]  [address]  [phone]    [facility]  Pulmonary, Critical Care, and Sleep Medicine    Pulmonary Office Initial Consultation  Name: William Lawrence 68 y.o. male  MRN: [id number]  : 1955  Service Date: 24    Referring Provider: Coco Rubi APRN - NP  [address] [place] [address]  Chief Complaint:   Chief Complaint   Patient presents with    New Patient     Referred by NP Coco Rubi for SOB and exposure to asbestos    Results     CXR 9/15/2023 ([facility])         History of Present Illness:  (pt with wife)  William Lawrence is a 68 y.o. male, who presents to Pulmonary clinic referred for coughing up mucus.  Pt reports sx started about 5-10 years ago  Pt reports chronic cough, productive - black and brown mucus vs white  Worse in the mornings but occurs throughout the day.  Sx worse when driving -- occurs everytime he gets in the care  Waxes and wanes in severity  Pt attempted mucinex, singulair, Spiriva.  Pt reports no difference with any of these  No seasonal variation  When coughing, wife reports he gets SOB.  Reports relieved with PRN albuterol --- using 3-4x per week.  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 Uses inhaler 3 X week PRN    Hepatitis C 2018    Treated , Tests negative    HIV (human immunodeficiency virus infection) (HCC)     Nondetectable    Osteoarthritis of right hip 2018    Palpitation  Controlled with Metoprolol     Past Surgical Hx: I have personally reviewed surgical hx  Past Surgical History:   Procedure Laterality Date    CERVICAL DISC SURGERY  2004    COLONOSCOPY N/A

## 2024-02-02 ENCOUNTER — ANESTHESIA EVENT (OUTPATIENT)
Facility: HOSPITAL | Age: 69
End: 2024-02-02
Payer: MEDICAID

## 2024-02-02 ENCOUNTER — TELEPHONE (OUTPATIENT)
Facility: HOSPITAL | Age: 69
End: 2024-02-02

## 2024-02-02 ENCOUNTER — PREP FOR PROCEDURE (OUTPATIENT)
Age: 69
End: 2024-02-02

## 2024-02-02 RX ORDER — CELECOXIB 100 MG/1
200 CAPSULE ORAL ONCE
Status: CANCELLED | OUTPATIENT
Start: 2024-02-02 | End: 2024-02-02

## 2024-02-02 RX ORDER — GABAPENTIN 100 MG/1
300 CAPSULE ORAL
Status: CANCELLED | OUTPATIENT
Start: 2024-02-02 | End: 2024-02-02

## 2024-02-02 RX ORDER — SODIUM CHLORIDE 9 MG/ML
INJECTION, SOLUTION INTRAVENOUS PRN
Status: CANCELLED | OUTPATIENT
Start: 2024-02-02

## 2024-02-02 RX ORDER — SODIUM CHLORIDE 0.9 % (FLUSH) 0.9 %
5-40 SYRINGE (ML) INJECTION EVERY 12 HOURS SCHEDULED
Status: CANCELLED | OUTPATIENT
Start: 2024-02-02

## 2024-02-02 RX ORDER — SODIUM CHLORIDE 0.9 % (FLUSH) 0.9 %
5-40 SYRINGE (ML) INJECTION PRN
Status: CANCELLED | OUTPATIENT
Start: 2024-02-02

## 2024-02-02 RX ORDER — ACETAMINOPHEN 325 MG/1
1000 TABLET ORAL ONCE
Status: CANCELLED | OUTPATIENT
Start: 2024-02-02 | End: 2024-02-02

## 2024-02-05 ENCOUNTER — ANESTHESIA (OUTPATIENT)
Facility: HOSPITAL | Age: 69
End: 2024-02-05
Payer: MEDICAID

## 2024-02-05 ENCOUNTER — HOSPITAL ENCOUNTER (OUTPATIENT)
Facility: HOSPITAL | Age: 69
Discharge: HOME | End: 2024-02-05
Attending: ORTHOPAEDIC SURGERY | Admitting: ORTHOPAEDIC SURGERY
Payer: MEDICAID

## 2024-02-05 ENCOUNTER — APPOINTMENT (OUTPATIENT)
Facility: HOSPITAL | Age: 69
End: 2024-02-05
Attending: ORTHOPAEDIC SURGERY
Payer: MEDICAID

## 2024-02-05 VITALS
HEIGHT: 70 IN | DIASTOLIC BLOOD PRESSURE: 84 MMHG | WEIGHT: 208 LBS | HEART RATE: 70 BPM | SYSTOLIC BLOOD PRESSURE: 167 MMHG | BODY MASS INDEX: 29.78 KG/M2 | OXYGEN SATURATION: 99 % | TEMPERATURE: 97.7 F | RESPIRATION RATE: 18 BRPM

## 2024-02-05 PROBLEM — Z96.611 S/P SHOULDER REPLACEMENT, RIGHT: Status: ACTIVE | Noted: 2024-02-05

## 2024-02-05 LAB
AMPHET UR QL SCN: NEGATIVE
BARBITURATES UR QL SCN: NEGATIVE
BENZODIAZ UR QL: NEGATIVE
CANNABINOIDS UR QL SCN: NEGATIVE
COCAINE UR QL SCN: NEGATIVE
Lab: NORMAL
METHADONE UR QL: NEGATIVE
OPIATES UR QL: NEGATIVE
PCP UR QL: NEGATIVE

## 2024-02-05 PROCEDURE — 3700000000 HC ANESTHESIA ATTENDED CARE: Performed by: ORTHOPAEDIC SURGERY

## 2024-02-05 PROCEDURE — 80307 DRUG TEST PRSMV CHEM ANLYZR: CPT

## 2024-02-05 PROCEDURE — 2580000003 HC RX 258

## 2024-02-05 PROCEDURE — 2720000010 HC SURG SUPPLY STERILE: Performed by: ORTHOPAEDIC SURGERY

## 2024-02-05 PROCEDURE — 23472 RECONSTRUCT SHOULDER JOINT: CPT | Performed by: ORTHOPAEDIC SURGERY

## 2024-02-05 PROCEDURE — 3600000012 HC SURGERY LEVEL 2 ADDTL 15MIN: Performed by: ORTHOPAEDIC SURGERY

## 2024-02-05 PROCEDURE — 6370000000 HC RX 637 (ALT 250 FOR IP): Performed by: ORTHOPAEDIC SURGERY

## 2024-02-05 PROCEDURE — 73020 X-RAY EXAM OF SHOULDER: CPT

## 2024-02-05 PROCEDURE — 7100000000 HC PACU RECOVERY - FIRST 15 MIN: Performed by: ORTHOPAEDIC SURGERY

## 2024-02-05 PROCEDURE — C1713 ANCHOR/SCREW BN/BN,TIS/BN: HCPCS | Performed by: ORTHOPAEDIC SURGERY

## 2024-02-05 PROCEDURE — 7100000001 HC PACU RECOVERY - ADDTL 15 MIN: Performed by: ORTHOPAEDIC SURGERY

## 2024-02-05 PROCEDURE — 3700000001 HC ADD 15 MINUTES (ANESTHESIA): Performed by: ORTHOPAEDIC SURGERY

## 2024-02-05 PROCEDURE — 7100000011 HC PHASE II RECOVERY - ADDTL 15 MIN: Performed by: ORTHOPAEDIC SURGERY

## 2024-02-05 PROCEDURE — 2500000003 HC RX 250 WO HCPCS: Performed by: NURSE ANESTHETIST, CERTIFIED REGISTERED

## 2024-02-05 PROCEDURE — 2580000003 HC RX 258: Performed by: ORTHOPAEDIC SURGERY

## 2024-02-05 PROCEDURE — 64415 NJX AA&/STRD BRCH PLXS IMG: CPT | Performed by: ANESTHESIOLOGY

## 2024-02-05 PROCEDURE — 2500000003 HC RX 250 WO HCPCS

## 2024-02-05 PROCEDURE — 6370000000 HC RX 637 (ALT 250 FOR IP): Performed by: NURSE ANESTHETIST, CERTIFIED REGISTERED

## 2024-02-05 PROCEDURE — 7100000010 HC PHASE II RECOVERY - FIRST 15 MIN: Performed by: ORTHOPAEDIC SURGERY

## 2024-02-05 PROCEDURE — 6360000002 HC RX W HCPCS: Performed by: NURSE ANESTHETIST, CERTIFIED REGISTERED

## 2024-02-05 PROCEDURE — 2709999900 HC NON-CHARGEABLE SUPPLY: Performed by: ORTHOPAEDIC SURGERY

## 2024-02-05 PROCEDURE — A4217 STERILE WATER/SALINE, 500 ML: HCPCS | Performed by: ORTHOPAEDIC SURGERY

## 2024-02-05 PROCEDURE — C1776 JOINT DEVICE (IMPLANTABLE): HCPCS | Performed by: ORTHOPAEDIC SURGERY

## 2024-02-05 PROCEDURE — 3600000002 HC SURGERY LEVEL 2 BASE: Performed by: ORTHOPAEDIC SURGERY

## 2024-02-05 PROCEDURE — 6360000002 HC RX W HCPCS: Performed by: ANESTHESIOLOGY

## 2024-02-05 PROCEDURE — 6360000002 HC RX W HCPCS

## 2024-02-05 PROCEDURE — 2580000003 HC RX 258: Performed by: NURSE ANESTHETIST, CERTIFIED REGISTERED

## 2024-02-05 PROCEDURE — 6360000002 HC RX W HCPCS: Performed by: ORTHOPAEDIC SURGERY

## 2024-02-05 DEVICE — SPHERE GLEN DIA36MM +4 BASEPLT 24MM SHLDR LAT TAPR MOD UNIV: Type: IMPLANTABLE DEVICE | Site: SHOULDER | Status: FUNCTIONAL

## 2024-02-05 DEVICE — SCREW BNE L28MM DIA5.5MM PERIPH LOK FOR MOD GLEN SYS: Type: IMPLANTABLE DEVICE | Site: SHOULDER | Status: FUNCTIONAL

## 2024-02-05 DEVICE — SCREW BNE L36MM DIA5.5MM PERIPH LOK FOR MOD GLEN SYS: Type: IMPLANTABLE DEVICE | Site: SHOULDER | Status: FUNCTIONAL

## 2024-02-05 DEVICE — SCREW BNE L20MM DIA45MM PERIPH NONLOCKING FOR MOD GLEN SYS: Type: IMPLANTABLE DEVICE | Site: SHOULDER | Status: FUNCTIONAL

## 2024-02-05 DEVICE — IMPLANTABLE DEVICE: Type: IMPLANTABLE DEVICE | Status: FUNCTIONAL

## 2024-02-05 DEVICE — LINER HUM SM DIA36MM +3MM OFFSET SHLDR UHMWPE UNIVERS: Type: IMPLANTABLE DEVICE | Site: SHOULDER | Status: FUNCTIONAL

## 2024-02-05 DEVICE — IMPLANTABLE DEVICE: Type: IMPLANTABLE DEVICE | Site: SHOULDER | Status: FUNCTIONAL

## 2024-02-05 DEVICE — CUP HUM DIA36MM +2MM OFFSET R SHLDR SUT UNIVERS REVERS: Type: IMPLANTABLE DEVICE | Site: SHOULDER | Status: FUNCTIONAL

## 2024-02-05 DEVICE — SCREW BNE L32MM DIA5.5MM PERIPH LOK FOR MOD GLEN SYS: Type: IMPLANTABLE DEVICE | Site: SHOULDER | Status: FUNCTIONAL

## 2024-02-05 DEVICE — MODULAR POST 30MM: Type: IMPLANTABLE DEVICE | Status: FUNCTIONAL

## 2024-02-05 RX ORDER — PROPOFOL 10 MG/ML
INJECTION, EMULSION INTRAVENOUS PRN
Status: DISCONTINUED | OUTPATIENT
Start: 2024-02-05 | End: 2024-02-05 | Stop reason: SDUPTHER

## 2024-02-05 RX ORDER — CELECOXIB 100 MG/1
200 CAPSULE ORAL ONCE
Status: COMPLETED | OUTPATIENT
Start: 2024-02-05 | End: 2024-02-05

## 2024-02-05 RX ORDER — FENTANYL CITRATE 50 UG/ML
25 INJECTION, SOLUTION INTRAMUSCULAR; INTRAVENOUS EVERY 5 MIN PRN
Status: DISCONTINUED | OUTPATIENT
Start: 2024-02-05 | End: 2024-02-05 | Stop reason: HOSPADM

## 2024-02-05 RX ORDER — LIDOCAINE HYDROCHLORIDE 10 MG/ML
1 INJECTION, SOLUTION EPIDURAL; INFILTRATION; INTRACAUDAL; PERINEURAL
Status: COMPLETED | OUTPATIENT
Start: 2024-02-05 | End: 2024-02-05

## 2024-02-05 RX ORDER — SODIUM CHLORIDE 9 MG/ML
INJECTION, SOLUTION INTRAVENOUS PRN
Status: DISCONTINUED | OUTPATIENT
Start: 2024-02-05 | End: 2024-02-05 | Stop reason: HOSPADM

## 2024-02-05 RX ORDER — PHENYLEPHRINE HCL IN 0.9% NACL 1 MG/10 ML
SYRINGE (ML) INTRAVENOUS PRN
Status: DISCONTINUED | OUTPATIENT
Start: 2024-02-05 | End: 2024-02-05 | Stop reason: SDUPTHER

## 2024-02-05 RX ORDER — ROCURONIUM BROMIDE 10 MG/ML
INJECTION, SOLUTION INTRAVENOUS PRN
Status: DISCONTINUED | OUTPATIENT
Start: 2024-02-05 | End: 2024-02-05 | Stop reason: SDUPTHER

## 2024-02-05 RX ORDER — GLYCOPYRROLATE 0.2 MG/ML
INJECTION INTRAMUSCULAR; INTRAVENOUS PRN
Status: DISCONTINUED | OUTPATIENT
Start: 2024-02-05 | End: 2024-02-05 | Stop reason: SDUPTHER

## 2024-02-05 RX ORDER — SODIUM CHLORIDE 0.9 % (FLUSH) 0.9 %
5-40 SYRINGE (ML) INJECTION EVERY 12 HOURS SCHEDULED
Status: DISCONTINUED | OUTPATIENT
Start: 2024-02-05 | End: 2024-02-05 | Stop reason: HOSPADM

## 2024-02-05 RX ORDER — MIDAZOLAM HYDROCHLORIDE 2 MG/2ML
2 INJECTION, SOLUTION INTRAMUSCULAR; INTRAVENOUS
Status: COMPLETED | OUTPATIENT
Start: 2024-02-05 | End: 2024-02-05

## 2024-02-05 RX ORDER — ROPIVACAINE HYDROCHLORIDE 5 MG/ML
30 INJECTION, SOLUTION EPIDURAL; INFILTRATION; PERINEURAL ONCE
Status: COMPLETED | OUTPATIENT
Start: 2024-02-05 | End: 2024-02-05

## 2024-02-05 RX ORDER — ONDANSETRON 2 MG/ML
4 INJECTION INTRAMUSCULAR; INTRAVENOUS
Status: DISCONTINUED | OUTPATIENT
Start: 2024-02-05 | End: 2024-02-05 | Stop reason: HOSPADM

## 2024-02-05 RX ORDER — DEXAMETHASONE SODIUM PHOSPHATE 4 MG/ML
INJECTION, SOLUTION INTRA-ARTICULAR; INTRALESIONAL; INTRAMUSCULAR; INTRAVENOUS; SOFT TISSUE PRN
Status: DISCONTINUED | OUTPATIENT
Start: 2024-02-05 | End: 2024-02-05 | Stop reason: SDUPTHER

## 2024-02-05 RX ORDER — ROPIVACAINE HYDROCHLORIDE 5 MG/ML
INJECTION, SOLUTION EPIDURAL; INFILTRATION; PERINEURAL
Status: COMPLETED | OUTPATIENT
Start: 2024-02-05 | End: 2024-02-05

## 2024-02-05 RX ORDER — ONDANSETRON 2 MG/ML
INJECTION INTRAMUSCULAR; INTRAVENOUS PRN
Status: DISCONTINUED | OUTPATIENT
Start: 2024-02-05 | End: 2024-02-05 | Stop reason: SDUPTHER

## 2024-02-05 RX ORDER — NEOSTIGMINE METHYLSULFATE 1 MG/ML
INJECTION, SOLUTION INTRAVENOUS PRN
Status: DISCONTINUED | OUTPATIENT
Start: 2024-02-05 | End: 2024-02-05 | Stop reason: SDUPTHER

## 2024-02-05 RX ORDER — ACETAMINOPHEN 500 MG
1000 TABLET ORAL ONCE
Status: COMPLETED | OUTPATIENT
Start: 2024-02-05 | End: 2024-02-05

## 2024-02-05 RX ORDER — SODIUM CHLORIDE 0.9 % (FLUSH) 0.9 %
5-40 SYRINGE (ML) INJECTION PRN
Status: DISCONTINUED | OUTPATIENT
Start: 2024-02-05 | End: 2024-02-05 | Stop reason: HOSPADM

## 2024-02-05 RX ORDER — FAMOTIDINE 20 MG/1
20 TABLET, FILM COATED ORAL ONCE
Status: COMPLETED | OUTPATIENT
Start: 2024-02-05 | End: 2024-02-05

## 2024-02-05 RX ORDER — OXYCODONE HYDROCHLORIDE AND ACETAMINOPHEN 5; 325 MG/1; MG/1
1 TABLET ORAL ONCE
Status: COMPLETED | OUTPATIENT
Start: 2024-02-05 | End: 2024-02-05

## 2024-02-05 RX ORDER — GABAPENTIN 300 MG/1
300 CAPSULE ORAL
Status: COMPLETED | OUTPATIENT
Start: 2024-02-05 | End: 2024-02-05

## 2024-02-05 RX ORDER — FENTANYL CITRATE 50 UG/ML
100 INJECTION, SOLUTION INTRAMUSCULAR; INTRAVENOUS
Status: COMPLETED | OUTPATIENT
Start: 2024-02-05 | End: 2024-02-05

## 2024-02-05 RX ORDER — LIDOCAINE HYDROCHLORIDE 20 MG/ML
INJECTION, SOLUTION EPIDURAL; INFILTRATION; INTRACAUDAL; PERINEURAL PRN
Status: DISCONTINUED | OUTPATIENT
Start: 2024-02-05 | End: 2024-02-05 | Stop reason: SDUPTHER

## 2024-02-05 RX ORDER — FENTANYL CITRATE 50 UG/ML
INJECTION, SOLUTION INTRAMUSCULAR; INTRAVENOUS PRN
Status: DISCONTINUED | OUTPATIENT
Start: 2024-02-05 | End: 2024-02-05 | Stop reason: SDUPTHER

## 2024-02-05 RX ORDER — SODIUM CHLORIDE, SODIUM LACTATE, POTASSIUM CHLORIDE, CALCIUM CHLORIDE 600; 310; 30; 20 MG/100ML; MG/100ML; MG/100ML; MG/100ML
INJECTION, SOLUTION INTRAVENOUS CONTINUOUS
Status: DISCONTINUED | OUTPATIENT
Start: 2024-02-05 | End: 2024-02-05 | Stop reason: HOSPADM

## 2024-02-05 RX ORDER — SODIUM CHLORIDE, SODIUM LACTATE, POTASSIUM CHLORIDE, CALCIUM CHLORIDE 600; 310; 30; 20 MG/100ML; MG/100ML; MG/100ML; MG/100ML
INJECTION, SOLUTION INTRAVENOUS CONTINUOUS PRN
Status: DISCONTINUED | OUTPATIENT
Start: 2024-02-05 | End: 2024-02-05 | Stop reason: SDUPTHER

## 2024-02-05 RX ADMIN — FENTANYL CITRATE 25 MCG: 50 INJECTION INTRAMUSCULAR; INTRAVENOUS at 09:50

## 2024-02-05 RX ADMIN — ROPIVACAINE HYDROCHLORIDE 30 ML: 5 INJECTION EPIDURAL; INFILTRATION; PERINEURAL at 07:24

## 2024-02-05 RX ADMIN — CELECOXIB 200 MG: 100 CAPSULE ORAL at 07:11

## 2024-02-05 RX ADMIN — Medication 100 MCG: at 08:07

## 2024-02-05 RX ADMIN — ACETAMINOPHEN 1000 MG: 500 TABLET ORAL at 07:11

## 2024-02-05 RX ADMIN — SODIUM CHLORIDE, SODIUM LACTATE, POTASSIUM CHLORIDE, AND CALCIUM CHLORIDE: 600; 310; 30; 20 INJECTION, SOLUTION INTRAVENOUS at 07:39

## 2024-02-05 RX ADMIN — Medication 100 MCG: at 09:27

## 2024-02-05 RX ADMIN — Medication 100 MCG: at 08:32

## 2024-02-05 RX ADMIN — SODIUM CHLORIDE, POTASSIUM CHLORIDE, SODIUM LACTATE AND CALCIUM CHLORIDE: 600; 310; 30; 20 INJECTION, SOLUTION INTRAVENOUS at 07:18

## 2024-02-05 RX ADMIN — ROCURONIUM BROMIDE 60 MG: 10 INJECTION, SOLUTION INTRAVENOUS at 07:47

## 2024-02-05 RX ADMIN — LIDOCAINE HYDROCHLORIDE 80 MG: 20 INJECTION, SOLUTION EPIDURAL; INFILTRATION; INTRACAUDAL; PERINEURAL at 07:46

## 2024-02-05 RX ADMIN — HYDROMORPHONE HYDROCHLORIDE 0.5 MG: 1 INJECTION, SOLUTION INTRAMUSCULAR; INTRAVENOUS; SUBCUTANEOUS at 10:09

## 2024-02-05 RX ADMIN — LIDOCAINE HYDROCHLORIDE 1 ML: 10 INJECTION, SOLUTION EPIDURAL; INFILTRATION; INTRACAUDAL; PERINEURAL at 07:23

## 2024-02-05 RX ADMIN — GLYCOPYRROLATE 0.4 MG: 0.2 INJECTION INTRAMUSCULAR; INTRAVENOUS at 09:48

## 2024-02-05 RX ADMIN — Medication 3 MG: at 09:48

## 2024-02-05 RX ADMIN — FENTANYL CITRATE 50 MCG: 50 INJECTION INTRAMUSCULAR; INTRAVENOUS at 09:15

## 2024-02-05 RX ADMIN — MIDAZOLAM HYDROCHLORIDE 2 MG: 1 INJECTION, SOLUTION INTRAMUSCULAR; INTRAVENOUS at 07:23

## 2024-02-05 RX ADMIN — DEXAMETHASONE SODIUM PHOSPHATE 4 MG: 4 INJECTION INTRA-ARTICULAR; INTRALESIONAL; INTRAMUSCULAR; INTRAVENOUS; SOFT TISSUE at 08:08

## 2024-02-05 RX ADMIN — OXYCODONE HYDROCHLORIDE AND ACETAMINOPHEN 1 TABLET: 5; 325 TABLET ORAL at 11:54

## 2024-02-05 RX ADMIN — Medication 100 MCG: at 08:15

## 2024-02-05 RX ADMIN — GABAPENTIN 300 MG: 300 CAPSULE ORAL at 07:11

## 2024-02-05 RX ADMIN — ONDANSETRON 4 MG: 2 INJECTION INTRAMUSCULAR; INTRAVENOUS at 08:08

## 2024-02-05 RX ADMIN — Medication 50 MCG: at 09:01

## 2024-02-05 RX ADMIN — FENTANYL CITRATE 25 MCG: 50 INJECTION INTRAMUSCULAR; INTRAVENOUS at 09:57

## 2024-02-05 RX ADMIN — FAMOTIDINE 20 MG: 20 TABLET ORAL at 07:11

## 2024-02-05 RX ADMIN — FENTANYL CITRATE 100 MCG: 50 INJECTION, SOLUTION INTRAMUSCULAR; INTRAVENOUS at 07:23

## 2024-02-05 RX ADMIN — PROPOFOL 150 MG: 10 INJECTION, EMULSION INTRAVENOUS at 07:46

## 2024-02-05 RX ADMIN — ROPIVACAINE HYDROCHLORIDE 30 ML: 5 INJECTION EPIDURAL; INFILTRATION; PERINEURAL at 05:00

## 2024-02-05 RX ADMIN — Medication 50 MCG: at 09:09

## 2024-02-05 RX ADMIN — Medication 100 MCG: at 08:30

## 2024-02-05 RX ADMIN — HYDROMORPHONE HYDROCHLORIDE 0.5 MG: 1 INJECTION, SOLUTION INTRAMUSCULAR; INTRAVENOUS; SUBCUTANEOUS at 10:14

## 2024-02-05 RX ADMIN — VANCOMYCIN HYDROCHLORIDE 1500 MG: 1 INJECTION, POWDER, LYOPHILIZED, FOR SOLUTION INTRAVENOUS at 07:54

## 2024-02-05 ASSESSMENT — PAIN SCALES - GENERAL
PAINLEVEL_OUTOF10: 7
PAINLEVEL_OUTOF10: 5
PAINLEVEL_OUTOF10: 9
PAINLEVEL_OUTOF10: 6
PAINLEVEL_OUTOF10: 9
PAINLEVEL_OUTOF10: 8

## 2024-02-05 ASSESSMENT — PAIN DESCRIPTION - DESCRIPTORS
DESCRIPTORS: SORE

## 2024-02-05 ASSESSMENT — PAIN DESCRIPTION - ORIENTATION: ORIENTATION: RIGHT

## 2024-02-05 ASSESSMENT — PAIN - FUNCTIONAL ASSESSMENT
PAIN_FUNCTIONAL_ASSESSMENT: 0-10
PAIN_FUNCTIONAL_ASSESSMENT: 0-10

## 2024-02-05 ASSESSMENT — PAIN DESCRIPTION - PAIN TYPE: TYPE: SURGICAL PAIN

## 2024-02-05 ASSESSMENT — PAIN DESCRIPTION - LOCATION
LOCATION: SHOULDER

## 2024-02-05 NOTE — PERIOP NOTE
Patient /Family /Designee has been informed that [facility] is not responsible for patient belongings per policy and the signed [facility] Patient Agreement document.  Personal items should be sent home or checked in with security.  Patient /Family /Designee selected the following action:                            []  Send personal items home with a family member or friend                                                 []  Check in personal items with security, excluding clothing                            [x]  Maintain personal items at the bedside, against recommendation                                 by Gurpreet Menendez [facility]                                   ** If patient /family /designee chooses to maintain personal items at the bedside,                                      Complete the patient belongings inventory in the EMR.   Belongings are in PACU locker.  Contact is wife, Rosa Lawrence.  Number: [phone]

## 2024-02-05 NOTE — DISCHARGE INSTRUCTIONS
DISCHARGE SUMMARY from Nurse    PATIENT INSTRUCTIONS:    After general anesthesia or intravenous sedation, for 24 hours or while taking prescription Narcotics:  Limit your activities  Do not drive and operate hazardous machinery  Do not make important personal or business decisions  Do  not drink alcoholic beverages  If you have not urinated within 8 hours after discharge, please contact your surgeon on call.    Report the following to your surgeon:  Excessive pain, swelling, redness or odor of or around the surgical area  Temperature over 100.5  Nausea and vomiting lasting longer than 4 hours or if unable to take medications  Any signs of decreased circulation or nerve impairment to extremity: change in color, persistent  numbness, tingling, coldness or increase pain  Any questions      These are general instructions for a healthy lifestyle:    No smoking/ No tobacco products/ Avoid exposure to second hand smoke  Surgeon General's Warning:  Quitting smoking now greatly reduces serious risk to your health.    Obesity, smoking, and sedentary lifestyle greatly increases your risk for illness    A healthy diet, regular physical exercise & weight monitoring are important for maintaining a healthy lifestyle    You may be retaining fluid if you have a history of heart failure or if you experience any of the following symptoms:  Weight gain of 3 pounds or more overnight or 5 pounds in a week, increased swelling in our hands or feet or shortness of breath while lying flat in bed.  Please call your doctor as soon as you notice any of these symptoms; do not wait until your next office visit.        The discharge information has been reviewed with the patient and spouse.  The patient and spouse verbalized understanding.  Discharge medications reviewed with the patient and spouse and appropriate educational materials and side effects teaching were

## 2024-02-05 NOTE — OP NOTE
dressing was placed.  The right arm was placed in a sling.  The patient was then awakened from anesthesia, extubated, and taken to PACU in stable condition with a plan for discharge home.    Electronically signed by Armand Rodirguez MD on 2/5/2024 at 10:03 AM

## 2024-02-05 NOTE — ANESTHESIA POSTPROCEDURE EVALUATION
Department of Anesthesiology  Postprocedure Note    Patient: William Lawrence  MRN: [id number]  YOB: 1955  Date of evaluation: 2/5/2024    Procedure Summary       Date: 02/05/24 Room / Location: [facility] MAIN 05 / [facility] MAIN OR    Anesthesia Start: 0739 Anesthesia Stop: 1008    Procedure: RIGHT REVERSE TOTAL SHOULDER ARTHROPLASTY; [[employer]], SPIDER, TMAX; NERVE BLOCK; 23 HR (Right: Shoulder) Diagnosis:       Rotator cuff arthropathy, right      (Rotator cuff arthropathy, right [M12.811])    Surgeons: Armand Rodriguez MD Responsible Provider: Janusz Levine MD    Anesthesia Type: General, Regional ASA Status: 3            Anesthesia Type: General, Regional    Oneyda Phase I: Oneyda Score: 10    Oneyda Phase II: Oneyda Score: 10    Anesthesia Post Evaluation    Patient location during evaluation: bedside  Patient participation: complete - patient participated  Level of consciousness: awake  Pain score: 5  Airway patency: patent  Nausea & Vomiting: no nausea  Cardiovascular status: hemodynamically stable  Respiratory status: acceptable  Hydration status: euvolemic  Pain management: satisfactory to patient        No notable events documented.

## 2024-02-05 NOTE — BRIEF OP NOTE
Brief Postoperative Note      Patient: William Lawrence  YOB: 1955  MRN: [id number]    Date of Procedure: 2/5/2024    Pre-Op Diagnosis Codes:     * Rotator cuff arthropathy, right [M12.811]    Post-Op Diagnosis: Same       Procedure(s):  RIGHT REVERSE TOTAL SHOULDER ARTHROPLASTY; [[employer]], SPIDER, TMAX; NERVE BLOCK; 23 HR    Surgeon(s):  Armand Rodriguez MD    Assistant:  Surgical Assistant: Andrew Bang    Anesthesia: General    Estimated Blood Loss (mL): less than 100     Complications: None    Specimens:   * No specimens in log *    Implants:  Implant Name Type Inv. Item Serial No.  Lot No. LRB No. Used Action   BASEPLATE GLENOID FULL AUGMENT 20 DEGREE 24 MM - [id number]  BASEPLATE GLENOID FULL AUGMENT 20 DEGREE 24 MM [id number] [employer] [id number] Right 1 Implanted   MODULAR POST 30MM - [id number]  MODULAR POST 30MM [id number] [employer] [id number] Right 1 Implanted   SCREW BNE L20MM [id number] PERIPH NONLOCKING FOR MOD URSZULA SYS - [id number]  SCREW BNE L20MM [id number] PERIPH NONLOCKING FOR MOD URSZULA SYS  [employer] [id number] Right 1 Implanted   SCREW BNE L28MM DIA5.5MM PERIPH PHYLLIS FOR MOD URSZULA SYS - [id number]  SCREW BNE L28MM DIA5.5MM PERIPH PHYLLIS FOR MOD URSZLUA SYS  [employer] [id number] Right 1 Implanted   SCREW BNE L36MM DIA5.5MM PERIPH PHYLLIS FOR MOD URSZULA SYS - [id number]  SCREW BNE L36MM DIA5.5MM PERIPH PHYLLIS FOR MOD URSZULA SYS  [employer] [id number] Right 1 Implanted   SCREW BNE L32MM DIA5.5MM PERIPH PHYLLIS FOR MOD URSZULA SYS - [id number]  SCREW BNE L32MM DIA5.5MM PERIPH PHYLLIS FOR MOD URSZULA SYS  [employer] [id number] Right 1 Implanted   SPHERE URSZULA [id number] +4 BASEPLT 24MM SHLDR LAT TAPR MOD UNIV - [id number]  SPHERE URSZULA [id number] +4 BASEPLT 24MM SHLDR LAT TAPR MOD UNIV  [employer] [id number] Right 1 Implanted   CUP HUM [id number] +2MM OFFSET R SHLDR SUT [facility] REVERS - [id number]  CUP HUM [id number] +2MM OFFSET R SHLDR SUT [facility] REVERS  [employer] [id number] Right 1

## 2024-02-05 NOTE — ANESTHESIA PROCEDURE NOTES
Peripheral Block    Patient location during procedure: pre-op  Reason for block: post-op pain management and at surgeon's request  Start time: 2/5/2024 7:23 AM  End time: 2/5/2024 7:28 AM  Staffing  Anesthesiologist: Janusz Levine MD  Performed by: Janusz Levine MD  Authorized by: Janusz Levine MD    Peripheral Block   Patient position: supine  Prep: ChloraPrep  Provider prep: mask  Patient monitoring: continuous pulse ox, frequent blood pressure checks, IV access, oxygen and responsive to questions  Block type: Brachial plexus  Interscalene  Laterality: right  Injection technique: single-shot  Guidance: Doppler guided  Local infiltration: ropivacaine  Infiltration strength: 0.5 %  Local infiltration: ropivacaine  Dose: 30 mL    Needle   Needle type: insulated echogenic nerve stimulator needle   Needle gauge: 22 G  Needle localization: ultrasound guidance and nerve stimulator  Assessment   Injection assessment: negative aspiration for heme, no paresthesia on injection, local visualized surrounding nerve on ultrasound and no intravascular symptoms  Slow fractionated injection: yes  Hemodynamics: stable  Real-time US image taken/store: yes  Outcomes: uncomplicated    Medications Administered  ropivacaine (NAROPIN) injection 0.5% - Perineural   30 mL - 2/5/2024 5:00:00 AM

## 2024-02-05 NOTE — ANESTHESIA PRE PROCEDURE
Department of Anesthesiology  Preprocedure Note       Name:  William Lawrence   Age:  68 y.o.  :  1955                                          MRN:  [id number]         Date:  2024      Surgeon: Surgeon(s):  Armand Rodriguez MD    Procedure: Procedure(s):  RIGHT REVERSE TOTAL SHOULDER ARTHROPLASTY; [[employer]], SPIDER, TMAX; NERVE BLOCK; 23 HR    Medications prior to admission:   Prior to Admission medications    Medication Sig Start Date End Date Taking? Authorizing Provider   Multiple Vitamins-Minerals (MULTIVITAMIN MEN 50+ PO) Take 1 tablet by mouth Daily    Grazyna Garcia MD   fluticasone-umeclidin-vilant (TRELEGY ELLIPTA) 200-62.5-25 MCG/ACT AEPB inhaler Inhale 1 puff into the lungs daily Rinse and gargle mouth after each use 24   Abran Silva MD   naloxone 4 MG/0.1ML LIQD nasal spray 1 spray by Nasal route as needed for Opioid Reversal 23   Grazyna Garcia MD   Azelastine HCl 137 MCG/SPRAY SOLN 2 sprays by Each Nostril route daily 10/23/23   Grazyna Garcia MD   fluticasone (FLONASE) 50 MCG/ACT nasal spray 2 sprays by Nasal route daily as needed for Rhinitis    Grazyna Garcia MD   ondansetron (ZOFRAN-ODT) 4 MG disintegrating tablet Place 1 tablet under the tongue every 8 hours as needed for Nausea or Vomiting    Grazyna Garcia MD   oxyCODONE-acetaminophen (PERCOCET) 7.5-325 MG per tablet Take 1 tablet by mouth every 6 hours as needed for Pain. 10/5/23   Grazyna Garcia MD   sildenafil (VIAGRA) 100 MG tablet Take 1 tablet by mouth as needed for Erectile Dysfunction    Grazyna Garcia MD   albuterol sulfate HFA (PROVENTIL;VENTOLIN;PROAIR) 108 (90 Base) MCG/ACT inhaler Inhale 2 puffs into the lungs every 4 hours as needed for Wheezing or Shortness of Breath 20   Grazyna Garcia MD   aspirin 81 MG EC tablet Take 1 tablet by mouth daily    Grazyna Garcia MD   bictegravir-emtricitab-tenofovir alafenamide (BIKTARVY)

## 2024-02-07 ENCOUNTER — TELEPHONE (OUTPATIENT)
Age: 69
End: 2024-02-07

## 2024-02-08 ENCOUNTER — OFFICE VISIT (OUTPATIENT)
Age: 69
End: 2024-02-08

## 2024-02-08 DIAGNOSIS — M12.811 ROTATOR CUFF ARTHROPATHY OF RIGHT SHOULDER: Primary | ICD-10-CM

## 2024-02-08 PROCEDURE — 99024 POSTOP FOLLOW-UP VISIT: CPT | Performed by: PHYSICIAN ASSISTANT

## 2024-02-08 NOTE — TELEPHONE ENCOUNTER
Called and spoke to patient and patient's wife. She states that the blood is not red but there is some \"drainage\". I advised that that can be normal after the surgery and that the dressing on it is waterproof and shouldn't come through the dressing. Advised patient and patient's wife to keep an eye on it and if it becomes cici red blood, or any redness and swelling to let us know. Advised that if it becomes concerning call us and we can get him in sooner than his post op appointment. Patient and patient's wife both verbalized understanding and thanked writer for the call.  
Patient called in first thing this morning stating he has to be seen by us today.  He states he is bleeding and was shouting during the call.  He was advised we will call him back as soon as possible to further discuss, [phone].  
Patient spouse Rosa called and states that the patient had surgery on 02/05/24 and the patient incision on his right shoulder is bleeding bad and is requesting a call back to see how they should move forward        Please call and advise patient spouse at   [phone]  
No
No

## 2024-02-08 NOTE — PROGRESS NOTES
William Lawrence  1955     HISTORY OF PRESENT ILLNESS  William Lawrence is a 68 y.o. male who presents today for evaluation s/p right reverse total shoulder arthroplasty by Dr. Rodriguez on 2/2/24.  Patient presents for a wound check.  He states that starting yesterday he started noticed drainage from his incision and now he has pockets of fluid in his bandage.  He denies any fever.  He denies any injury.  Patient denies any fever, chills, chest pain, shortness of breath or calf pain. The remainder of the review of systems is negative. There are no new illness or injuries to report since last seen in the office. No changes in medications, allergies, social or family history.      PHYSICAL EXAM:   There were no vitals taken for this visit.   The patient is a well-developed, well-nourished male in no acute distress.  The patient is alert and oriented times three. The patient appears to be well groomed. Mood and affect are normal.  ORTHOPEDIC EXAM of right shoulder:  Inspection: swelling present,  Bruising present  Incision, clean, dry, intact, sutures in place, collection x2 of blood tinged serous fluid in bandage  Did not assess motion of shoulder  Able to move elbow  2+ distal pulses    IMPRESSION:  S/P right reverse total shoulder arthroplasty    PLAN:   Patient's bandage was removed today.  There is no evidence of dislocation on exam today.  He does have significant swelling in the shoulder.  Fluid appears to be serous fluid.  Incision was cleaned.  Steri-Strips were applied.  A new bandage was applied.  An additional bandage was given to the patient to change if needed.  Patient will follow-up with Dr. Rodriguez next week.  Encouraged him to call if the drainage continues or he has any fevers.  RTC 1 week with Dr. Michael Viveros PA-C  [facility]

## 2024-02-14 ENCOUNTER — OFFICE VISIT (OUTPATIENT)
Age: 69
End: 2024-02-14
Payer: MEDICAID

## 2024-02-14 DIAGNOSIS — Z96.611 STATUS POST REVERSE TOTAL ARTHROPLASTY OF RIGHT SHOULDER: Primary | ICD-10-CM

## 2024-02-14 PROCEDURE — 99024 POSTOP FOLLOW-UP VISIT: CPT | Performed by: ORTHOPAEDIC SURGERY

## 2024-02-14 PROCEDURE — 73030 X-RAY EXAM OF SHOULDER: CPT | Performed by: ORTHOPAEDIC SURGERY

## 2024-02-14 RX ORDER — OXYCODONE HYDROCHLORIDE AND ACETAMINOPHEN 5; 325 MG/1; MG/1
1 TABLET ORAL EVERY 6 HOURS PRN
Qty: 28 TABLET | Refills: 0 | Status: SHIPPED | OUTPATIENT
Start: 2024-02-14 | End: 2024-02-21

## 2024-02-14 RX ORDER — CLINDAMYCIN HYDROCHLORIDE 150 MG/1
CAPSULE ORAL
Qty: 4 CAPSULE | OUTPATIENT
Start: 2024-02-14

## 2024-02-14 NOTE — PROGRESS NOTES
[facility] AMBULATORY OFFICE NOTE    Patient: William Lawrence                MRN: [id number]       SSN: [id number]  YOB: 1955        AGE: 68 y.o.        SEX: male  There is no height or weight on file to calculate BMI.    PCP: Eileen Giron MD  02/14/24    Chief Complaint: Right shoulder follow-up    HPI: William Lawrence is a 68 y.o. male patient who returns today for his right shoulder.  He is now little over 1 week out from his right reverse shoulder replacement.  He had initial issue with the bandage which was removed last week.  Otherwise he has not been having any problems with his incision.  He has been wearing his sling.  He reports his pain is 5 out of 10 today.    Past Medical History:   Diagnosis Date    Bronchitis     Chronic . Uses inhaler 3 X week PRN    Hepatitis C 2018    Treated , Tests negative    HIV (human immunodeficiency virus infection) (HCC)     Nondetectable    Osteoarthritis of right hip 01/23/2018    Palpitation 2018 1/22 Controlled with Metoprolol       Family History   Problem Relation Age of Onset    Heart Attack Maternal Grandmother     Heart Attack Paternal Aunt     Heart Attack Brother     Heart Attack Father     Heart Attack Paternal Grandmother     No Known Problems Mother        Current Outpatient Medications   Medication Sig Dispense Refill    oxyCODONE-acetaminophen (PERCOCET) 5-325 MG per tablet Take 1 tablet by mouth every 6 hours as needed for Pain for up to 7 days. Intended supply: 7 days. Take lowest dose possible to manage pain Max Daily Amount: 4 tablets 28 tablet 0    Multiple Vitamins-Minerals (MULTIVITAMIN MEN 50+ PO) Take 1 tablet by mouth Daily      fluticasone-umeclidin-vilant (TRELEGY ELLIPTA) 200-62.5-25 MCG/ACT AEPB inhaler Inhale 1 puff into the lungs daily Rinse and gargle mouth after each use 3 each 3    naloxone 4 MG/0.1ML LIQD nasal spray 1 spray by Nasal route as needed for Opioid Reversal

## 2024-02-14 NOTE — TELEPHONE ENCOUNTER
Patient forgot to request antibiotics at today's appointment for upcoming dental cleaning and filling appointments scheduled for 2/21/24 & 2/27/24.  Please call in to [facility] on [address] in [place].

## 2024-02-16 ENCOUNTER — CLINICAL DOCUMENTATION (OUTPATIENT)
Age: 69
End: 2024-02-16

## 2024-02-16 NOTE — PROGRESS NOTES
Form received from [facility] for Clearance For Dental Treatment via Fax at our [facility] location.    Blank form scanned into patient's chart.    Form placed in forms bin at [facility] location for completion.

## 2024-02-20 ENCOUNTER — CLINICAL DOCUMENTATION (OUTPATIENT)
Age: 69
End: 2024-02-20

## 2024-03-04 DIAGNOSIS — Z96.611 STATUS POST REVERSE TOTAL ARTHROPLASTY OF RIGHT SHOULDER: Primary | ICD-10-CM

## 2024-03-05 RX ORDER — OXYCODONE HYDROCHLORIDE AND ACETAMINOPHEN 5; 325 MG/1; MG/1
1 TABLET ORAL EVERY 6 HOURS PRN
Qty: 28 TABLET | Refills: 0 | Status: SHIPPED | OUTPATIENT
Start: 2024-03-05 | End: 2024-03-12

## 2024-03-13 ENCOUNTER — OFFICE VISIT (OUTPATIENT)
Age: 69
End: 2024-03-13
Payer: MEDICAID

## 2024-03-13 DIAGNOSIS — Z96.611 STATUS POST REVERSE TOTAL ARTHROPLASTY OF RIGHT SHOULDER: Primary | ICD-10-CM

## 2024-03-13 PROCEDURE — 73030 X-RAY EXAM OF SHOULDER: CPT | Performed by: ORTHOPAEDIC SURGERY

## 2024-03-13 PROCEDURE — 99024 POSTOP FOLLOW-UP VISIT: CPT | Performed by: ORTHOPAEDIC SURGERY

## 2024-03-13 NOTE — PROGRESS NOTES
[facility] AMBULATORY OFFICE NOTE    Patient: William Lawrence                MRN: [id number]       SSN: [id number]  YOB: 1955        AGE: 68 y.o.        SEX: male  There is no height or weight on file to calculate BMI.    PCP: Eileen Giron MD  03/13/24    Chief Complaint: Right shoulder follow-up    HPI: William Lawrence is a 68 y.o. male patient who returns 1 month out from his right reverse shoulder replacement.  Pain is controlled.    Past Medical History:   Diagnosis Date    Bronchitis     Chronic . Uses inhaler 3 X week PRN    Hepatitis C 2018    Treated , Tests negative    HIV (human immunodeficiency virus infection) (HCC)     Nondetectable    Osteoarthritis of right hip 01/23/2018    Palpitation 2018 1/22 Controlled with Metoprolol       Family History   Problem Relation Age of Onset    Heart Attack Maternal Grandmother     Heart Attack Paternal Aunt     Heart Attack Brother     Heart Attack Father     Heart Attack Paternal Grandmother     No Known Problems Mother        Current Outpatient Medications   Medication Sig Dispense Refill    Multiple Vitamins-Minerals (MULTIVITAMIN MEN 50+ PO) Take 1 tablet by mouth Daily      fluticasone-umeclidin-vilant (TRELEGY ELLIPTA) 200-62.5-25 MCG/ACT AEPB inhaler Inhale 1 puff into the lungs daily Rinse and gargle mouth after each use 3 each 3    naloxone 4 MG/0.1ML LIQD nasal spray 1 spray by Nasal route as needed for Opioid Reversal      Azelastine HCl 137 MCG/SPRAY SOLN 2 sprays by Each Nostril route daily      fluticasone (FLONASE) 50 MCG/ACT nasal spray 2 sprays by Nasal route daily as needed for Rhinitis      ondansetron (ZOFRAN-ODT) 4 MG disintegrating tablet Place 1 tablet under the tongue every 8 hours as needed for Nausea or Vomiting      oxyCODONE-acetaminophen (PERCOCET) 7.5-325 MG per tablet Take 1 tablet by mouth every 6 hours as needed for Pain.      sildenafil (VIAGRA) 100 MG tablet Take 1

## 2024-03-14 ENCOUNTER — TELEPHONE (OUTPATIENT)
Facility: HOSPITAL | Age: 69
End: 2024-03-14

## 2024-03-14 ENCOUNTER — HOSPITAL ENCOUNTER (OUTPATIENT)
Facility: HOSPITAL | Age: 69
Setting detail: RECURRING SERIES
Discharge: HOME | End: 2024-03-17
Payer: MEDICAID

## 2024-03-14 PROCEDURE — 97162 PT EVAL MOD COMPLEX 30 MIN: CPT | Performed by: PHYSICAL THERAPIST

## 2024-03-14 PROCEDURE — 97140 MANUAL THERAPY 1/> REGIONS: CPT | Performed by: PHYSICAL THERAPIST

## 2024-03-14 NOTE — TELEPHONE ENCOUNTER
CXL>24hrs. Patient has another appt, unable to make 3/26 appt. Offered to reschedule to 3/27 at 1pm or 140pm but the wife declined.

## 2024-03-14 NOTE — THERAPY EVALUATION
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    Comorbidities: Allergies, BMI 30.4     Assessment / key information:  Patient with signs and symptoms consistent with right shoulder pain and limited ROM s/p right reverse total shoulder.  Patient voices that he had his rotator cuff replaced.   He presents with limited AROM and expresses a goal of his right shoulder being as good as his left shoulder and to be able to reach overhead without pain.  MMT deferred at this time, strength is grossly 2/5.  Functionally, he is unable to reach to his     Patient will benefit from a program of skilled physical therapy to include therapeutic exercises to address strength deficits, therapeutic activities to improve functional mobility, neuromuscular reeducation to address balance, coordination and proprioception, manual therapy to address ROM and tissue extensibility and modalities as indicated.  All questions were answered.    Post operative: URGENT: Patient was evaluated for a post operative condition and early physical therapy intervention is critical to positive outcomes.  Insurance authorization should be expedited to prevent delay in treatment.      Evaluation Complexity:  History:  MEDIUM  Complexity : 1-2 comorbidities / personal factors will impact the outcome/ POC ; Examination:  MEDIUM Complexity : 3 Standardized tests and measures addressin body structure, function, 
PT DAILY TREATMENT NOTE/SHOULDER EVAL     Patient Name: William Lawrence    Date: 3/14/2024    : 1955  Insurance: Payor: [employer] / Plan: [facility] PLAN MEDICARE / Product Type: *No Product type* /      Patient  verified yes     Visit #   Current / Total 1 24   Time   In / Out 1:02 1:34   Pain   In / Out 0/10 8   Subjective Functional Status/Changes: Patient voices frustration that his ROM is not the same as his left arm yet and that he cannot reach overhead.  He states he had a new rotator cuff done.       Treatment Area: Right shoulder pain [M25.511]    SUBJECTIVE  Pain Level (0-10 scale): 0/10 now, 0/10 at best; 10/10 at worst.  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  Limitations to PLOF: limited use of the right UE  Mechanism of Injury: States he had constant pain from arthritis.  Had a reverse total shoulder surgery on 2024.    Current symptoms/Complaints: His CC is lack of motion and weakness in the right shoulder.  Has intermittent pain, mostly when he exercises.    Previous Treatment/Compliance: None  PMHx/Surgical Hx: reverse TSA, right shoulder 2/3/2024. Hep C, HIV, Right OSCAR 2017  Work Hx: Retired   Pt Goals: \"Stretch my arm out and lift my arm up without using the other arm, so I can control it and use it.\"  Barriers: [x]pain []financial []time []transportation []other  Cognition: A & O x 3    Other:    OBJECTIVE/EXAMINATION  Domestic Life: Lives with his wife.  Activity/Recreational Limitations: limited use of his right arm, he is right hand dominant.  Mobility: ambulates without deviation.  Self Care: Needs help with self care      20 min []Eval - untimed                      Therapeutic Procedures:  Tx Min Billable or 1:1 Min (if diff from Tx Min) Procedure, Rationale, Specifics   12  81457 Manual Therapy 
None

## 2024-03-18 ENCOUNTER — HOSPITAL ENCOUNTER (OUTPATIENT)
Facility: HOSPITAL | Age: 69
Setting detail: RECURRING SERIES
Discharge: HOME | End: 2024-03-21
Payer: MEDICAID

## 2024-03-18 DIAGNOSIS — Z96.611 STATUS POST REVERSE TOTAL ARTHROPLASTY OF RIGHT SHOULDER: Primary | ICD-10-CM

## 2024-03-18 PROCEDURE — 97110 THERAPEUTIC EXERCISES: CPT

## 2024-03-18 PROCEDURE — 97140 MANUAL THERAPY 1/> REGIONS: CPT

## 2024-03-18 RX ORDER — OXYCODONE HYDROCHLORIDE AND ACETAMINOPHEN 5; 325 MG/1; MG/1
1 TABLET ORAL EVERY 6 HOURS PRN
Qty: 28 TABLET | Refills: 0 | Status: SHIPPED | OUTPATIENT
Start: 2024-03-18 | End: 2024-03-25

## 2024-03-18 NOTE — PROGRESS NOTES
PHYSICAL / OCCUPATIONAL THERAPY - DAILY TREATMENT NOTE    Patient Name: William Lawrence    Date: 3/18/2024    : 1955  Insurance: Payor: [employer] [address] / Plan: [facility] PLAN MEDICARE / Product Type: *No Product type* /      Patient  verified Yes     Visit #   Current / Total 2 24   Time   In / Out 9:40 10:20   Pain   In / Out 8 12+   Subjective Functional Status/Changes: Patient notes he is in a lot of pain this morning but he took his pain medication one hour before he came in. The pain is bad now that he is doing his exercises. He does do the exercises daily at home thus far. The other shoulder has to be done too but needs to finish the rehab for the R shoulder.      TREATMENT AREA =  Right shoulder pain [M25.511]    OBJECTIVE      Therapeutic Procedures:    78045 Therapeutic Exercise (timed):  increase ROM, strength, coordination, balance, and proprioception to improve patient's ability to progress to PLOF and address remaining functional goals.   Tx Min Billable or 1:1 Min   (if diff from Tx Min) Details:   32  See flow sheet as applicable       08932 Manual Therapy (timed):  decrease pain, increase ROM, and increase tissue extensibility to improve patient's ability to progress to PLOF and address remaining functional goals.  The manual therapy interventions were performed at a separate and distinct time from the therapeutic activities interventions .   Tx Min Billable or 1:1 Min   (if diff from Tx Min) Details:   8  See flow sheet as applicable and PROM per post operative protocol with GHJ mobilizations Gr II         40  [facility] Totals Reminder: bill using total billable min of TIMED therapeutic procedures (example: do not include dry needle or estim unattended, both untimed codes, in totals to left)  8-22 min = 1 unit; 23-37 min = 2 units; 38-52 min = 3 units; 53-67 min = 4 units; 68-82 min = 5 units   Total Total     [x]  Patient Education billed concurrently with other

## 2024-03-18 NOTE — TELEPHONE ENCOUNTER
Patient called and is requesting a refill on         oxyCODONE-acetaminophen (PERCOCET) 5-325 MG per tablet           [facility]   [address] [place] [id number]  Phone: [phone]  Fax: [phone]

## 2024-03-20 ENCOUNTER — HOSPITAL ENCOUNTER (OUTPATIENT)
Facility: HOSPITAL | Age: 69
Setting detail: RECURRING SERIES
Discharge: HOME | End: 2024-03-23
Payer: MEDICAID

## 2024-03-20 PROCEDURE — 97110 THERAPEUTIC EXERCISES: CPT | Performed by: PHYSICAL THERAPIST

## 2024-03-20 PROCEDURE — 97140 MANUAL THERAPY 1/> REGIONS: CPT | Performed by: PHYSICAL THERAPIST

## 2024-03-20 NOTE — PROGRESS NOTES
PHYSICAL / OCCUPATIONAL THERAPY - DAILY TREATMENT NOTE    Patient Name: William Lawrence    Date: 3/20/2024    : 1955  Insurance: Payor: [employer] [address] / Plan: [facility] PLAN MEDICARE / Product Type: *No Product type* /      Patient  verified Yes     Visit #   Current / Total 3 24   Time   In / Out 1:00 1:40   Pain   In / Out 0 12   Subjective Functional Status/Changes: States he is feeling no pain due to being medicated.     TREATMENT AREA =  Right shoulder pain [M25.511]    OBJECTIVE    Therapeutic Procedures:    15532 Therapeutic Exercise (timed):  increase ROM, strength, coordination, balance, and proprioception to improve patient's ability to progress to PLOF and address remaining functional goals.   Tx Min Billable or 1:1 Min   (if diff from Tx Min) Details:   30  See flow sheet as applicable     92859 Manual Therapy (timed):  decrease pain, increase ROM, and increase tissue extensibility to improve patient's ability to progress to PLOF and address remaining functional goals.  The manual therapy interventions were performed at a separate and distinct time from the therapeutic activities interventions .   Tx Min Billable or 1:1 Min   (if diff from Tx Min) Details:   10  Right shoulder manual stretching per protocol to increase flex, abd, ER       40  [facility] Totals Reminder: bill using total billable min of TIMED therapeutic procedures (example: do not include dry needle or estim unattended, both untimed codes, in totals to left)  8-22 min = 1 unit; 23-37 min = 2 units; 38-52 min = 3 units; 53-67 min = 4 units; 68-82 min = 5 units   Total Total     [x]  Patient Education billed concurrently with other procedures   [x] Review HEP    [] Progressed/Changed HEP, detail:    [] Other detail:       Objective Information/Functional Measures/Assessment  Empty end feel to elevation and rotation.  Limited IR to 30 degrees in neutral add.    Patient will continue to benefit from skilled PT /

## 2024-03-22 ENCOUNTER — APPOINTMENT (OUTPATIENT)
Facility: HOSPITAL | Age: 69
End: 2024-03-22
Payer: MEDICAID

## 2024-03-26 ENCOUNTER — APPOINTMENT (OUTPATIENT)
Facility: HOSPITAL | Age: 69
End: 2024-03-26
Payer: MEDICAID

## 2024-03-27 ENCOUNTER — HOSPITAL ENCOUNTER (OUTPATIENT)
Facility: HOSPITAL | Age: 69
Setting detail: RECURRING SERIES
Discharge: HOME | End: 2024-03-30
Payer: MEDICAID

## 2024-03-27 PROCEDURE — 97112 NEUROMUSCULAR REEDUCATION: CPT | Performed by: PHYSICAL THERAPIST

## 2024-03-27 PROCEDURE — 97140 MANUAL THERAPY 1/> REGIONS: CPT | Performed by: PHYSICAL THERAPIST

## 2024-03-27 PROCEDURE — 97110 THERAPEUTIC EXERCISES: CPT | Performed by: PHYSICAL THERAPIST

## 2024-03-27 NOTE — PROGRESS NOTES
PHYSICAL / OCCUPATIONAL THERAPY - DAILY TREATMENT NOTE    Patient Name: William Lawrence    Date: 3/27/2024    : 1955  Insurance: Payor: [employer] / Plan: [facility] PLAN MEDICARE / Product Type: *No Product type* /      Patient  verified Yes     Visit #   Current / Total 4 24   Time   In / Out 1:42 2:22   Pain   In / Out 5 8   Subjective Functional Status/Changes: Patient states he is frustrated because he is not where he thinks he should be.  He feels he should be able to reach overhead, wants to be \"100% like I was before\".     TREATMENT AREA =  Right shoulder pain [M25.511]    OBJECTIVE    Therapeutic Procedures:    09494 Therapeutic Exercise (timed):  increase ROM, strength, coordination, balance, and proprioception to improve patient's ability to progress to PLOF and address remaining functional goals.   Tx Min Billable or 1:1 Min   (if diff from Tx Min) Details:   20  See flow sheet as applicable     41286 Neuromuscular Re-Education (timed):  improve balance, coordination, kinesthetic sense, posture, core stability and proprioception to improve patient's ability to develop conscious control of individual muscles and awareness of position of extremities in order to progress to PLOF and address remaining functional goals.   Tx Min Billable or 1:1 Min   (if diff from Tx Min) Details:   10  See flow sheet as applicable     83453 Manual Therapy (timed):  increase ROM and increase tissue extensibility to improve patient's ability to progress to PLOF and address remaining functional goals.  The manual therapy interventions were performed at a separate and distinct time from the therapeutic activities interventions .   Tx Min Billable or 1:1 Min   (if diff from Tx Min) Details:   10  Right shoulder manual stretching per protocol to increase flex, abd, ER        40  [facility] Totals Reminder: bill using total billable min of TIMED therapeutic procedures (example: do not include dry

## 2024-03-28 ENCOUNTER — APPOINTMENT (OUTPATIENT)
Facility: HOSPITAL | Age: 69
End: 2024-03-28
Payer: MEDICAID

## 2024-04-01 ENCOUNTER — TELEPHONE (OUTPATIENT)
Age: 69
End: 2024-04-01

## 2024-04-01 NOTE — TELEPHONE ENCOUNTER
Patient called to request a refill of   oxyCODONE-acetaminophen (PERCOCET) 5-325 MG per tablet be sent to the [facility] on [address] in [place].    Please review and advise patient, [phone]

## 2024-04-02 ENCOUNTER — HOSPITAL ENCOUNTER (OUTPATIENT)
Facility: HOSPITAL | Age: 69
Setting detail: RECURRING SERIES
Discharge: HOME | End: 2024-04-05
Payer: MEDICAID

## 2024-04-02 DIAGNOSIS — G89.18 ACUTE POSTOPERATIVE PAIN: Primary | ICD-10-CM

## 2024-04-02 PROCEDURE — 97112 NEUROMUSCULAR REEDUCATION: CPT

## 2024-04-02 PROCEDURE — 97110 THERAPEUTIC EXERCISES: CPT

## 2024-04-02 PROCEDURE — 97140 MANUAL THERAPY 1/> REGIONS: CPT

## 2024-04-02 RX ORDER — OXYCODONE AND ACETAMINOPHEN 7.5; 325 MG/1; MG/1
1 TABLET ORAL EVERY 6 HOURS PRN
Qty: 28 TABLET | Refills: 0 | OUTPATIENT
Start: 2024-04-02 | End: 2024-04-09

## 2024-04-02 RX ORDER — OXYCODONE HYDROCHLORIDE AND ACETAMINOPHEN 5; 325 MG/1; MG/1
1 TABLET ORAL EVERY 6 HOURS PRN
Qty: 28 TABLET | Refills: 0 | Status: SHIPPED | OUTPATIENT
Start: 2024-04-02 | End: 2024-04-09

## 2024-04-02 NOTE — TELEPHONE ENCOUNTER
Patient is requesting an update about his message for 4/1 about a refill for oxycodone.    Patient tel [phone]

## 2024-04-02 NOTE — PROGRESS NOTES
include dry needle or estim unattended, both untimed codes, in totals to left)  8-22 min = 1 unit; 23-37 min = 2 units; 38-52 min = 3 units; 53-67 min = 4 units; 68-82 min = 5 units   Total Total     [x]  Patient Education billed concurrently with other procedures   [x] Review HEP    [] Progressed/Changed HEP, detail:    [] Other detail:       Objective Information/Functional Measures/Assessment    Decr'd tolerance to therex today with c/o significant incr'd pain after over working it this weekend. Pt is seeking medication advice and was told to contact MD. Pt encouraged to focus on gentle stretching and avoid incr'd pain. PD modalities and states he will ice at home.     Patient will continue to benefit from skilled PT / OT services to modify and progress therapeutic interventions, analyze and address functional mobility deficits, analyze and address ROM deficits, analyze and address strength deficits, analyze and address soft tissue restrictions, analyze and cue for proper movement patterns, and analyze and modify for postural abnormalities to address functional deficits and attain remaining goals.    Progress toward goals / Updated goals:  []  See Progress Note/Recertification    Short Term Goals: To be accomplished in 1 WEEKS  1.  Patient will become proficient in their HEP and will be compliant in performing that program.  Evaluation:   Patient given a written/illustrated HEP.  Current: reports daily compliance with HEP. MET. 3/18/24.     Long Term Goals: To be accomplished in 8 WEEKS  1. Patient's pain level will be 0-4/10 with activity in order to improve patient's ability to perform normal ADLs.  Evaluation:  0/10-10/10  Current:  0/10-10/10.  3/27/2024.  Progressing.  2. Patient will demonstrate AROM right shoulder flex 0-130, scaption 0-120, IR 0-30, ER 0-30 to increase ease of ADLs.  Evaluation:  Right shoulder AROM standing flex 0-65, scaption 0-63, ER @ 0 degrees 0-20.  Current:  Right shoulder AROM

## 2024-04-04 ENCOUNTER — HOSPITAL ENCOUNTER (OUTPATIENT)
Facility: HOSPITAL | Age: 69
Setting detail: RECURRING SERIES
Discharge: HOME | End: 2024-04-07
Payer: MEDICAID

## 2024-04-04 PROCEDURE — 97110 THERAPEUTIC EXERCISES: CPT

## 2024-04-04 PROCEDURE — 97112 NEUROMUSCULAR REEDUCATION: CPT

## 2024-04-04 PROCEDURE — 97140 MANUAL THERAPY 1/> REGIONS: CPT

## 2024-04-04 NOTE — PROGRESS NOTES
compliance with HEP. MET. 3/18/24.     Long Term Goals: To be accomplished in 8 WEEKS  1. Patient's pain level will be 0-4/10 with activity in order to improve patient's ability to perform normal ADLs.  Evaluation:  0/10-10/10  Current:  0/10-10/10.  3/27/2024.  Progressing.  2. Patient will demonstrate AROM right shoulder flex 0-130, scaption 0-120, IR 0-30, ER 0-30 to increase ease of ADLs.  Evaluation:  Right shoulder AROM standing flex 0-65, scaption 0-63, ER @ 0 degrees 0-20.  Current:  Right shoulder AROM standing flex 0-110, scaption 0-100.  ULISSES unable to reach occiput, FIR unable to complete. 4/4/24. Progressing gradually.    3. Patient will increase FOTO score to  65 to indicate increased functional mobility.  Evaluation:  32  4. Patient will be able to reach a shelf at shoulder height without difficulty in order to perform normal ADLs.  Evaluation:  Notes, \"I can't do that\".  Current:  Patient is able to reach a shelf just below shoulder height with little difficulty.  3/27/2024.  Progressing.       Next PN/ RC due 4/14/2024 (pn 5/11/2024)  Auth due (visit number/ date)  VARUN    PLAN  - Continue Plan of Care    Cristiana Loyd PT    4/4/2024    7:02 AM    Future Appointments   Date Time Provider Department Center   4/9/2024  7:40 AM [facility]   4/11/2024  7:00 AM [facility]oyd [facility]   4/24/2024 10:00 AM Armand Rodriguez MD [facility]   5/14/2024  8:45 AM Abran Silva MD [facility]

## 2024-04-09 ENCOUNTER — HOSPITAL ENCOUNTER (OUTPATIENT)
Facility: HOSPITAL | Age: 69
Setting detail: RECURRING SERIES
Discharge: HOME | End: 2024-04-12
Payer: MEDICAID

## 2024-04-09 PROCEDURE — 97112 NEUROMUSCULAR REEDUCATION: CPT

## 2024-04-09 PROCEDURE — 97110 THERAPEUTIC EXERCISES: CPT

## 2024-04-09 NOTE — PROGRESS NOTES
PHYSICAL / OCCUPATIONAL THERAPY - DAILY TREATMENT NOTE    Patient Name: William Lawrence    Date: 2024    : 1955  Insurance: Payor: [employer] / Plan: [facility] PLAN MEDICARE / Product Type: *No Product type* /      Patient  verified Yes     Visit #   Current / Total 7 24   Time   In / Out 7:39 8:15   Pain   In / Out 4 5   Subjective Functional Status/Changes: Patient states the shoulder is doing okay today but he has been working it a lot      TREATMENT AREA =  Right shoulder pain [M25.511]    OBJECTIVE     Therapeutic Procedures:    Tx Min Billable or 1:1 Min (if diff from Tx Min) Procedure, Rationale, Specifics   22 15 60585 Therapeutic Exercise (timed):  increase ROM, strength, coordination, balance, and proprioception to improve patient's ability to progress to PLOF and address remaining functional goals. (see flow sheet as applicable)     Details if applicable:  see flow sheet     9 8 11175 Neuromuscular Re-Education (timed):  improve balance, coordination, kinesthetic sense, posture, core stability and proprioception to improve patient's ability to develop conscious control of individual muscles and awareness of position of extremities in order to progress to PLOF and address remaining functional goals. (see flow sheet as applicable)     Details if applicable:  see flow sheet    5 5 72602 Manual Therapy (timed):  decrease pain, increase ROM, and increase tissue extensibility to improve patient's ability to progress to PLOF and address remaining functional goals.  The manual therapy interventions were performed at a separate and distinct time from the therapeutic activities interventions . (see flow sheet as applicable)     Details if applicable:  PROM per post operative protocol focusing on end range holds to gain additional ROM, ER    36 28 [facility] Totals Reminder: bill using total billable min of TIMED therapeutic procedures (example: do not include dry needle or

## 2024-04-11 ENCOUNTER — HOSPITAL ENCOUNTER (OUTPATIENT)
Facility: HOSPITAL | Age: 69
Setting detail: RECURRING SERIES
Discharge: HOME | End: 2024-04-14
Payer: MEDICAID

## 2024-04-11 PROCEDURE — 97530 THERAPEUTIC ACTIVITIES: CPT

## 2024-04-11 PROCEDURE — 97110 THERAPEUTIC EXERCISES: CPT

## 2024-04-11 PROCEDURE — 97112 NEUROMUSCULAR REEDUCATION: CPT

## 2024-04-11 NOTE — PROGRESS NOTES
estim unattended, both untimed codes, in totals to left)  8-22 min = 1 unit; 23-37 min = 2 units; 38-52 min = 3 units; 53-67 min = 4 units; 68-82 min = 5 units   Total Total     [x]  Patient Education billed concurrently with other procedures   [] Review HEP    [x] Progressed/Changed HEP, detail: update HEP see exercises in chart  [] Other detail:       Objective Information/Functional Measures/Assessment    Right shoulder AROM standing flex 0-120, scaption 0-80.  ULISSES to occiput, FIR unable to complete    Tapered off manual intervention with focus into more active range and functional strengthening with appropriate challenge today. PROM is near functional with end range limitations     Patient will continue to benefit from skilled PT / OT services to modify and progress therapeutic interventions, analyze and address functional mobility deficits, analyze and address ROM deficits, analyze and address strength deficits, analyze and address soft tissue restrictions, analyze and cue for proper movement patterns, and analyze and modify for postural abnormalities to address functional deficits and attain remaining goals.    Progress toward goals / Updated goals:  []  See Progress Note/Recertification    Short Term Goals: To be accomplished in 1 WEEKS  1.  Patient will become proficient in their HEP and will be compliant in performing that program.  Evaluation:   Patient given a written/illustrated HEP.  Current: reports daily compliance with HEP. MET. 3/18/24.     Long Term Goals: To be accomplished in 8 WEEKS  1. Patient's pain level will be 0-4/10 with activity in order to improve patient's ability to perform normal ADLs.  Evaluation:  0/10-10/10  Current:  0/10-10/10.  4/11/2024.  Progressing.  2. Patient will demonstrate AROM right shoulder flex 0-130, scaption 0-120, IR 0-30, ER 0-30 to increase ease of ADLs.  Evaluation:  Right shoulder AROM standing flex 0-65, scaption 0-63, ER @ 0 degrees 0-20.  Current:  Right

## 2024-04-11 NOTE — PROGRESS NOTES
4/11/2024.  Progressing.  2. Patient will demonstrate AROM right shoulder flex 0-130, scaption 0-120, IR 0-30, ER 0-30 to increase ease of ADLs.  Evaluation:  Right shoulder AROM standing flex 0-65, scaption 0-63, ER @ 0 degrees 0-20.  Current:  Right shoulder AROM standing flex 0-120, scaption 0-80.  ULISSES to occiput, FIR unable to complete. 4/11/24. Progressing slowly with all motions.   3. Patient will increase FOTO score to  65 to indicate increased functional mobility.  Evaluation:  32  Current: FOTO= 36.increased by 4 points. 4/11/24.  4. Patient will be able to reach a shelf at shoulder height without difficulty in order to perform normal ADLs.  Evaluation:  Notes, \"I can't do that\".  Current:  Patient is able to reach a shelf just below shoulder height with little difficulty.  4/11/2024.  Progressing.       Payor: [employer] / Plan: [facility] MEDICARE / Product Type: *No Product type* /     Medicare, cannot change goals, cannot adjust frequency/duration, no signature required   Reporting Period: (date from last Prog Note/Eval to current Prog Note/Recert)  03/14/2024 - 04/11/2024    RECOMMENDATIONS  Continue therapy per initial Plan of Care or most recent Medicare Recert.      If you have any questions/comments please contact us directly.  Thank you for allowing us to assist in the care of your patient.    Cristiana Loyd, PT       4/11/2024       7:14 AM

## 2024-04-15 ENCOUNTER — TELEPHONE (OUTPATIENT)
Age: 69
End: 2024-04-15

## 2024-04-15 RX ORDER — CLINDAMYCIN HYDROCHLORIDE 150 MG/1
CAPSULE ORAL
Qty: 4 CAPSULE | Status: SHIPPED | OUTPATIENT
Start: 2024-04-15

## 2024-04-15 NOTE — TELEPHONE ENCOUNTER
Patient called and said he has a Dental appt this Friday 4/19/24 and next week as well.     Patient said that he needs some Antibiotic medication from , due to he had shoulder and hip surgery.    [facility] on [facility]  Tel. [phone]    Patient tel. [phone].    Note : patient said he had sx done by Dr. Rodriguez back in Feb of this year.

## 2024-04-16 ENCOUNTER — HOSPITAL ENCOUNTER (OUTPATIENT)
Facility: HOSPITAL | Age: 69
Setting detail: RECURRING SERIES
Discharge: HOME | End: 2024-04-19
Payer: MEDICAID

## 2024-04-16 PROCEDURE — 97530 THERAPEUTIC ACTIVITIES: CPT

## 2024-04-16 PROCEDURE — 97112 NEUROMUSCULAR REEDUCATION: CPT

## 2024-04-16 PROCEDURE — 97110 THERAPEUTIC EXERCISES: CPT

## 2024-04-16 NOTE — PROGRESS NOTES
PHYSICAL / OCCUPATIONAL THERAPY - DAILY TREATMENT NOTE    Patient Name: William Lawrence    Date: 2024    : 1955  Insurance: Payor: [employer] / Plan: [facility] PLAN MEDICARE / Product Type: *No Product type* /      Patient  verified Yes     Visit #   Current / Total 9 24   Time   In / Out 731 809   Pain   In / Out 5 10   Subjective Functional Status/Changes: Patient states that he is sore this morning because he works his shoulder hard at home.      TREATMENT AREA =  Right shoulder pain [M25.511]    OBJECTIVE    Therapeutic Procedures:    32584 Therapeutic Exercise (timed):  increase ROM, strength, coordination, balance, and proprioception to improve patient's ability to progress to PLOF and address remaining functional goals.   Tx Min Billable or 1:1 Min   (if diff from Tx Min) Details:   15  See flow sheet as applicable     15084 Neuromuscular Re-Education (timed):  improve balance, coordination, kinesthetic sense, posture, core stability and proprioception to improve patient's ability to develop conscious control of individual muscles and awareness of position of extremities in order to progress to PLOF and address remaining functional goals.   Tx Min Billable or 1:1 Min   (if diff from Tx Min) Details:   15  See flow sheet as applicable     81869 Therapeutic Activity (timed):  use of dynamic activities replicating functional movements to increase ROM, strength, coordination, balance, and proprioception in order to improve patient's ability to progress to PLOF and address remaining functional goals.   Tx Min Billable or 1:1 Min   (if diff from Tx Min) Details:   8  See flow sheet as applicable       38  [facility] Totals Reminder: bill using total billable min of TIMED therapeutic procedures (example: do not include dry needle or estim unattended, both untimed codes, in totals to left)  8-22 min = 1 unit; 23-37 min = 2 units; 38-52 min = 3 units; 53-67 min = 4 units; 68-82

## 2024-04-18 ENCOUNTER — HOSPITAL ENCOUNTER (OUTPATIENT)
Facility: HOSPITAL | Age: 69
Setting detail: RECURRING SERIES
Discharge: HOME | End: 2024-04-21
Payer: MEDICAID

## 2024-04-18 PROCEDURE — 97112 NEUROMUSCULAR REEDUCATION: CPT

## 2024-04-18 PROCEDURE — 97530 THERAPEUTIC ACTIVITIES: CPT

## 2024-04-18 PROCEDURE — 97110 THERAPEUTIC EXERCISES: CPT

## 2024-04-18 NOTE — PROGRESS NOTES
PHYSICAL / OCCUPATIONAL THERAPY - DAILY TREATMENT NOTE    Patient Name: William Lawrence    Date: 2024    : 1955  Insurance: Payor: [employer] / Plan: [facility] PLAN MEDICARE / Product Type: *No Product type* /      Patient  verified Yes     Visit #   Current / Total 10 24   Time   In / Out 7:35 8:13   Pain   In / Out 5 10   Subjective Functional Status/Changes: Patient states that he is sore this morning because he works his shoulder hard at home.      TREATMENT AREA =  Right shoulder pain [M25.511]    OBJECTIVE    Therapeutic Procedures:    74275 Therapeutic Exercise (timed):  increase ROM, strength, coordination, balance, and proprioception to improve patient's ability to progress to PLOF and address remaining functional goals.   Tx Min Billable or 1:1 Min   (if diff from Tx Min) Details:   20  See flow sheet as applicable     00750 Neuromuscular Re-Education (timed):  improve balance, coordination, kinesthetic sense, posture, core stability and proprioception to improve patient's ability to develop conscious control of individual muscles and awareness of position of extremities in order to progress to PLOF and address remaining functional goals.   Tx Min Billable or 1:1 Min   (if diff from Tx Min) Details:   10  See flow sheet as applicable     10460 Therapeutic Activity (timed):  use of dynamic activities replicating functional movements to increase ROM, strength, coordination, balance, and proprioception in order to improve patient's ability to progress to PLOF and address remaining functional goals.   Tx Min Billable or 1:1 Min   (if diff from Tx Min) Details:   8  See flow sheet as applicable       38  [facility] Totals Reminder: bill using total billable min of TIMED therapeutic procedures (example: do not include dry needle or estim unattended, both untimed codes, in totals to left)  8-22 min = 1 unit; 23-37 min = 2 units; 38-52 min = 3 units; 53-67 min = 4 units;

## 2024-04-23 ENCOUNTER — HOSPITAL ENCOUNTER (OUTPATIENT)
Facility: HOSPITAL | Age: 69
Setting detail: RECURRING SERIES
Discharge: HOME | End: 2024-04-26
Payer: MEDICAID

## 2024-04-23 PROCEDURE — 97530 THERAPEUTIC ACTIVITIES: CPT

## 2024-04-23 PROCEDURE — 97110 THERAPEUTIC EXERCISES: CPT

## 2024-04-23 PROCEDURE — 97112 NEUROMUSCULAR REEDUCATION: CPT

## 2024-04-23 NOTE — PROGRESS NOTES
PHYSICAL / OCCUPATIONAL THERAPY - DAILY TREATMENT NOTE    Patient Name: William Lawrence    Date: 2024    : 1955  Insurance: Payor: [employer] / Plan: [facility] PLAN MEDICARE / Product Type: *No Product type* /      Patient  verified Yes     Visit #   Current / Total 11 24   Time   In / Out 7:40 8:18   Pain   In / Out 6 10   Subjective Functional Status/Changes: \"It's sore today\"     TREATMENT AREA =  Right shoulder pain [M25.511]    OBJECTIVE    Therapeutic Procedures:    41717 Therapeutic Exercise (timed):  increase ROM, strength, coordination, balance, and proprioception to improve patient's ability to progress to PLOF and address remaining functional goals.   Tx Min Billable or 1:1 Min   (if diff from Tx Min) Details:   20  See flow sheet as applicable     24039 Neuromuscular Re-Education (timed):  improve balance, coordination, kinesthetic sense, posture, core stability and proprioception to improve patient's ability to develop conscious control of individual muscles and awareness of position of extremities in order to progress to PLOF and address remaining functional goals.   Tx Min Billable or 1:1 Min   (if diff from Tx Min) Details:   10  See flow sheet as applicable     45784 Therapeutic Activity (timed):  use of dynamic activities replicating functional movements to increase ROM, strength, coordination, balance, and proprioception in order to improve patient's ability to progress to PLOF and address remaining functional goals.   Tx Min Billable or 1:1 Min   (if diff from Tx Min) Details:   8  See flow sheet as applicable       38  [facility] Totals Reminder: bill using total billable min of TIMED therapeutic procedures (example: do not include dry needle or estim unattended, both untimed codes, in totals to left)  8-22 min = 1 unit; 23-37 min = 2 units; 38-52 min = 3 units; 53-67 min = 4 units; 68-82 min = 5 units   Total Total     [x]  Patient Education billed

## 2024-04-24 ENCOUNTER — OFFICE VISIT (OUTPATIENT)
Age: 69
End: 2024-04-24
Payer: MEDICAID

## 2024-04-24 VITALS — BODY MASS INDEX: 29.84 KG/M2 | RESPIRATION RATE: 16 BRPM | HEIGHT: 70 IN

## 2024-04-24 DIAGNOSIS — Z96.611 STATUS POST REVERSE TOTAL ARTHROPLASTY OF RIGHT SHOULDER: Primary | ICD-10-CM

## 2024-04-24 PROCEDURE — 73030 X-RAY EXAM OF SHOULDER: CPT | Performed by: ORTHOPAEDIC SURGERY

## 2024-04-24 PROCEDURE — 99024 POSTOP FOLLOW-UP VISIT: CPT | Performed by: ORTHOPAEDIC SURGERY

## 2024-04-24 RX ORDER — TRAMADOL HYDROCHLORIDE 50 MG/1
50 TABLET ORAL EVERY 6 HOURS PRN
Qty: 28 TABLET | Refills: 0 | Status: SHIPPED | OUTPATIENT
Start: 2024-04-24 | End: 2024-05-01

## 2024-04-24 NOTE — PROGRESS NOTES
[facility] AMBULATORY OFFICE NOTE    Patient: William Lawrence                MRN: [id number]       SSN: [id number]  YOB: 1955        AGE: 68 y.o.        SEX: male  Body mass index is 29.84 kg/m².    PCP: Eileen Giron MD  04/24/24    Chief Complaint: Right shoulder follow-up    HPI: William Lawrence is a 68 y.o. male patient who returns to the office today for his right shoulder.  2 and half months postop.  He is not as far along as he would like.  He still has some pain and stiffness in the right shoulder.    Past Medical History:   Diagnosis Date    Bronchitis     Chronic . Uses inhaler 3 X week PRN    Hepatitis C 2018    Treated , Tests negative    HIV (human immunodeficiency virus infection) (HCC)     Nondetectable    Osteoarthritis of right hip 01/23/2018    Palpitation 2018 1/22 Controlled with Metoprolol       Family History   Problem Relation Age of Onset    Heart Attack Maternal Grandmother     Heart Attack Paternal Aunt     Heart Attack Brother     Heart Attack Father     Heart Attack Paternal Grandmother     No Known Problems Mother        Current Outpatient Medications   Medication Sig Dispense Refill    clindamycin (CLEOCIN) 150 MG capsule Take 4 capsules by mouth 1 hour prior to appointment 4 capsule PRN    Multiple Vitamins-Minerals (MULTIVITAMIN MEN 50+ PO) Take 1 tablet by mouth Daily      fluticasone-umeclidin-vilant (TRELEGY ELLIPTA) 200-62.5-25 MCG/ACT AEPB inhaler Inhale 1 puff into the lungs daily Rinse and gargle mouth after each use 3 each 3    naloxone 4 MG/0.1ML LIQD nasal spray 1 spray by Nasal route as needed for Opioid Reversal      Azelastine HCl 137 MCG/SPRAY SOLN 2 sprays by Each Nostril route daily      fluticasone (FLONASE) 50 MCG/ACT nasal spray 2 sprays by Nasal route daily as needed for Rhinitis      ondansetron (ZOFRAN-ODT) 4 MG disintegrating tablet Place 1 tablet under the tongue every 8 hours as needed for Nausea

## 2024-04-25 ENCOUNTER — HOSPITAL ENCOUNTER (OUTPATIENT)
Facility: HOSPITAL | Age: 69
Setting detail: RECURRING SERIES
Discharge: HOME | End: 2024-04-28
Payer: MEDICAID

## 2024-04-25 PROCEDURE — 97110 THERAPEUTIC EXERCISES: CPT

## 2024-04-25 PROCEDURE — 97530 THERAPEUTIC ACTIVITIES: CPT

## 2024-04-25 PROCEDURE — 97112 NEUROMUSCULAR REEDUCATION: CPT

## 2024-04-25 NOTE — PROGRESS NOTES
53-67 min = 4 units; 68-82 min = 5 units   Total Total     [x]  Patient Education billed concurrently with other procedures   [x] Review HEP    [] Progressed/Changed HEP, detail:    [] Other detail:       Objective Information/Functional Measures/Assessment    Patient was unable to perform sidelying shoulder ER with weight today due to pain and having limited ROM.     Patient will continue to benefit from skilled PT / OT services to modify and progress therapeutic interventions, analyze and address functional mobility deficits, analyze and address ROM deficits, analyze and address strength deficits, analyze and address soft tissue restrictions, analyze and cue for proper movement patterns, analyze and modify for postural abnormalities, analyze and address imbalance/dizziness, and instruct in home and community integration to address functional deficits and attain remaining goals.    Progress toward goals / Updated goals:  []  See Progress Note/Recertification    Short Term Goals: To be accomplished in 1 WEEKS  1.  Patient will become proficient in their HEP and will be compliant in performing that program.  Evaluation:   Patient given a written/illustrated HEP.  Current: reports daily compliance with HEP. MET. 3/18/24.     Long Term Goals: To be accomplished in 8 WEEKS  1. Patient's pain level will be 0-4/10 with activity in order to improve patient's ability to perform normal ADLs.  Evaluation:  0/10-10/10  Current:  0/10-10/10.  4/11/2024.  Progressing.  2. Patient will demonstrate AROM right shoulder flex 0-130, scaption 0-120, IR 0-30, ER 0-30 to increase ease of ADLs.  Evaluation:  Right shoulder AROM standing flex 0-65, scaption 0-63, ER @ 0 degrees 0-20.  Current:  Right shoulder AROM standing flex 0-120, scaption 0-80.  ULISSES to occiput, FIR unable to complete. 4/11/24. Progressing slowly with all motions.   3. Patient will increase FOTO score to  65 to indicate increased functional mobility.  Evaluation:

## 2024-04-30 ENCOUNTER — HOSPITAL ENCOUNTER (OUTPATIENT)
Facility: HOSPITAL | Age: 69
Setting detail: RECURRING SERIES
Discharge: HOME | End: 2024-05-03
Payer: MEDICAID

## 2024-04-30 PROCEDURE — 97530 THERAPEUTIC ACTIVITIES: CPT

## 2024-04-30 PROCEDURE — 97112 NEUROMUSCULAR REEDUCATION: CPT

## 2024-04-30 PROCEDURE — 97110 THERAPEUTIC EXERCISES: CPT

## 2024-04-30 NOTE — PROGRESS NOTES
PHYSICAL / OCCUPATIONAL THERAPY - DAILY TREATMENT NOTE    Patient Name: William Lawrence    Date: 2024    : 1955  Insurance: Payor: [employer] / Plan: [facility] PLAN MEDICARE / Product Type: *No Product type* /      Patient  verified Yes     Visit #   Current / Total 13 24   Time   In / Out 737 815   Pain   In / Out 0 6   Subjective Functional Status/Changes: Patient states that the Tramadol is helping manage his pain. Patient states he is having difficulty with wiping with toileting activities.      TREATMENT AREA =  Right shoulder pain [M25.511]    OBJECTIVE      Therapeutic Procedures:       96162 Therapeutic Exercise (timed):  increase ROM, strength, coordination, balance, and proprioception to improve patient's ability to progress to PLOF and address remaining functional goals.   Tx Min Billable or 1:1 Min   (if diff from Tx Min) Details:   10   See flow sheet as applicable      89437 Neuromuscular Re-Education (timed):  improve balance, coordination, kinesthetic sense, posture, core stability and proprioception to improve patient's ability to develop conscious control of individual muscles and awareness of position of extremities in order to progress to PLOF and address remaining functional goals.   Tx Min Billable or 1:1 Min   (if diff from Tx Min) Details:   20   See flow sheet as applicable      67138 Therapeutic Activity (timed):  use of dynamic activities replicating functional movements to increase ROM, strength, coordination, balance, and proprioception in order to improve patient's ability to progress to PLOF and address remaining functional goals.   Tx Min Billable or 1:1 Min   (if diff from Tx Min) Details:   8   See flow sheet as applicable         38  [facility] Totals Reminder: bill using total billable min of TIMED therapeutic procedures (example: do not include dry needle or estim unattended, both untimed codes, in totals to left)  8-22 min = 1 unit; 23-37

## 2024-05-02 ENCOUNTER — HOSPITAL ENCOUNTER (OUTPATIENT)
Facility: HOSPITAL | Age: 69
Setting detail: RECURRING SERIES
Discharge: HOME | End: 2024-05-05
Payer: MEDICAID

## 2024-05-02 PROCEDURE — 97530 THERAPEUTIC ACTIVITIES: CPT

## 2024-05-02 PROCEDURE — 97110 THERAPEUTIC EXERCISES: CPT

## 2024-05-02 PROCEDURE — 97112 NEUROMUSCULAR REEDUCATION: CPT

## 2024-05-02 NOTE — PROGRESS NOTES
PHYSICAL / OCCUPATIONAL THERAPY - DAILY TREATMENT NOTE    Patient Name: William Lawrence    Date: 2024    : 1955  Insurance: Payor: [employer] / Plan: [facility] PLAN MEDICARE / Product Type: *No Product type* /      Patient  verified Yes     Visit #   Current / Total 14 24   Time   In / Out 820 858   Pain   In / Out 0 6   Subjective Functional Status/Changes: Patient states he feels he has noticed improved tolerance to activities with his right shoulder.      TREATMENT AREA =  Right shoulder pain [M25.511]    OBJECTIVE    Therapeutic Procedures:         31897 Therapeutic Exercise (timed):  increase ROM, strength, coordination, balance, and proprioception to improve patient's ability to progress to PLOF and address remaining functional goals.   Tx Min Billable or 1:1 Min   (if diff from Tx Min) Details:   10   See flow sheet as applicable      78182 Neuromuscular Re-Education (timed):  improve balance, coordination, kinesthetic sense, posture, core stability and proprioception to improve patient's ability to develop conscious control of individual muscles and awareness of position of extremities in order to progress to PLOF and address remaining functional goals.   Tx Min Billable or 1:1 Min   (if diff from Tx Min) Details:   20   See flow sheet as applicable      42877 Therapeutic Activity (timed):  use of dynamic activities replicating functional movements to increase ROM, strength, coordination, balance, and proprioception in order to improve patient's ability to progress to PLOF and address remaining functional goals.   Tx Min Billable or 1:1 Min   (if diff from Tx Min) Details:   8   See flow sheet as applicable         38   [facility] Totals Reminder: bill using total billable min of TIMED therapeutic procedures (example: do not include dry needle or estim unattended, both untimed codes, in totals to left)  8-22 min = 1 unit; 23-37 min = 2 units; 38-52 min = 3 units; 53-67

## 2024-05-07 ENCOUNTER — HOSPITAL ENCOUNTER (OUTPATIENT)
Facility: HOSPITAL | Age: 69
Setting detail: RECURRING SERIES
Discharge: HOME | End: 2024-05-10
Payer: MEDICAID

## 2024-05-07 PROCEDURE — 97530 THERAPEUTIC ACTIVITIES: CPT

## 2024-05-07 PROCEDURE — 97112 NEUROMUSCULAR REEDUCATION: CPT

## 2024-05-07 PROCEDURE — 97110 THERAPEUTIC EXERCISES: CPT

## 2024-05-07 NOTE — PROGRESS NOTES
PHYSICAL / OCCUPATIONAL THERAPY - DAILY TREATMENT NOTE    Patient Name: William Lawrence    Date: 2024    : 1955  Insurance: Payor: [employer] / Plan: [facility] PLAN MEDICARE / Product Type: *No Product type* /      Patient  verified Yes     Visit #   Current / Total 15 24   Time   In / Out 850 929   Pain   In / Out 0 6   Subjective Functional Status/Changes: Patient states that toileting activities have become easier.      TREATMENT AREA =  Right shoulder pain [M25.511]    OBJECTIVE    Therapeutic Procedures:      08898 Therapeutic Exercise (timed):  increase ROM, strength, coordination, balance, and proprioception to improve patient's ability to progress to PLOF and address remaining functional goals.   Tx Min Billable or 1:1 Min   (if diff from Tx Min) Details:   10   See flow sheet as applicable      49910 Neuromuscular Re-Education (timed):  improve balance, coordination, kinesthetic sense, posture, core stability and proprioception to improve patient's ability to develop conscious control of individual muscles and awareness of position of extremities in order to progress to PLOF and address remaining functional goals.   Tx Min Billable or 1:1 Min   (if diff from Tx Min) Details:   20   See flow sheet as applicable      99062 Therapeutic Activity (timed):  use of dynamic activities replicating functional movements to increase ROM, strength, coordination, balance, and proprioception in order to improve patient's ability to progress to PLOF and address remaining functional goals.   Tx Min Billable or 1:1 Min   (if diff from Tx Min) Details:   9   See flow sheet as applicable         39   [facility] Totals Reminder: bill using total billable min of TIMED therapeutic procedures (example: do not include dry needle or estim unattended, both untimed codes, in totals to left)  8-22 min = 1 unit; 23-37 min = 2 units; 38-52 min = 3 units; 53-67 min = 4 units; 68-82 min = 5 units

## 2024-05-09 ENCOUNTER — HOSPITAL ENCOUNTER (OUTPATIENT)
Facility: HOSPITAL | Age: 69
Setting detail: RECURRING SERIES
Discharge: HOME | End: 2024-05-12
Payer: MEDICAID

## 2024-05-09 PROCEDURE — 97530 THERAPEUTIC ACTIVITIES: CPT

## 2024-05-09 PROCEDURE — 97110 THERAPEUTIC EXERCISES: CPT

## 2024-05-09 PROCEDURE — 97112 NEUROMUSCULAR REEDUCATION: CPT

## 2024-05-09 NOTE — PROGRESS NOTES
[facility]  [address] [address] Ph: [phone] Fx: [phone]  DISCHARGE SUMMARY  Patient Name: William Lawrence : 1955   Treatment/Medical Diagnosis: Right shoulder pain [M25.511]   Referral Source: Armand Rodriguez MD     Date of Initial Visit: 3/14/24 Attended Visits: 16 Missed Visits: 0     SUMMARY OF TREATMENT  Patient reports 100% improvement with his shoulder overall. Patient has improved with right shoulder AROM and noted increased ease with ADLs of toileting activities and reaching overhead. Provided patient with updated HEP to continue with exercises.        CURRENT STATUS  Short Term Goals: To be accomplished in 1 WEEKS  1.  Patient will become proficient in their HEP and will be compliant in performing that program.  Evaluation:   Patient given a written/illustrated HEP.  Current: reports daily compliance with HEP. MET. 3/18/24.     Long Term Goals: To be accomplished in 8 WEEKS  1. Patient's pain level will be 0-4/10 with activity in order to improve patient's ability to perform normal ADLs.  Evaluation:  010-10/10  Current:  Progressing. 0/10-10.  2024.    2. Patient will demonstrate AROM right shoulder flex 0-130, scaption 0-120, IR 0-30, ER 0-30 to increase ease of ADLs.  Evaluation:  Right shoulder AROM standing flex 0-65, scaption 0-63, ER @ 0 degrees 0-20.  Current:  Right shoulder AROM standing flex 0-120, scaption 0-130.  ULISSES to T2, FIR middle glut 24   3. Patient will increase FOTO score to  65 to indicate increased functional mobility.  Evaluation:  32  Current: MET: FOTO = 67, 24  4. Patient will be able to reach a shelf at shoulder height without difficulty in order to perform normal ADLs.  Evaluation:  Notes, \"I can't do that\".  Current:  MET: patient reports no difficulty putting dishes away in overhead cabinet. 24         Medicare: Reporting Period (date from last assessment to current 
Physical Therapy Discharge Instructions      [facility] - [address] [address] Ph: [phone] Fx: [phone]    Patient: William Lawrence  : 1955      Continue Home Exercise Program 1 time per day      Continue with    [] Ice  as needed     [] Heat           Follow up with MD:     [] Upon completion of therapy     [x] As needed      Recommendations:     [x]   Return to activity with home program    []   Return to activity with the following modifications:       []Post Rehab Program    []Join Independent aquatic program     []Return to/join local gym      HARMAN ONEIL, PTA 2024 10:05 AM    
       [x]  Patient Education billed concurrently with other procedures   [x] Review HEP    [] Progressed/Changed HEP, detail:    [] Other detail:       Objective Information/Functional Measures/Assessment    Patient reports 100% improvement with his shoulder overall. Patient has improved with right shoulder AROM and noted increased ease with ADLs of toileting activities and reaching overhead. Provided patient with updated HEP to continue with exercises.         Progress toward goals / Updated goals:  []  See Progress Note/Recertification    Short Term Goals: To be accomplished in 1 WEEKS  1.  Patient will become proficient in their HEP and will be compliant in performing that program.  Evaluation:   Patient given a written/illustrated HEP.  Current: reports daily compliance with HEP. MET. 3/18/24.     Long Term Goals: To be accomplished in 8 WEEKS  1. Patient's pain level will be 0-4/10 with activity in order to improve patient's ability to perform normal ADLs.  Evaluation:  0/10-10/10  Current:  Progressing. 0/10-7/10.  5/9/2024.    2. Patient will demonstrate AROM right shoulder flex 0-130, scaption 0-120, IR 0-30, ER 0-30 to increase ease of ADLs.  Evaluation:  Right shoulder AROM standing flex 0-65, scaption 0-63, ER @ 0 degrees 0-20.  Current:  Right shoulder AROM standing flex 0-120, scaption 0-130.  ULISSES to T2, FIR middle glut 5/7/24   3. Patient will increase FOTO score to  65 to indicate increased functional mobility.  Evaluation:  32  Current: MET: FOTO = 67, 5/9/24  4. Patient will be able to reach a shelf at shoulder height without difficulty in order to perform normal ADLs.  Evaluation:  Notes, \"I can't do that\".  Current:  MET: patient reports no difficulty putting dishes away in overhead cabinet. 5/7/24        Next PN/ RC due 5/11/2024 RC  Auth due NA    PLAN  - Discharge due to : per patient request.     HARMAN ONEIL, PTA    5/9/2024    10:04 AM    Future Appointments   Date Time Provider

## 2024-05-17 ENCOUNTER — OFFICE VISIT (OUTPATIENT)
Age: 69
End: 2024-05-17
Payer: MEDICAID

## 2024-05-17 VITALS — BODY MASS INDEX: 30.21 KG/M2 | WEIGHT: 211 LBS | RESPIRATION RATE: 16 BRPM | HEIGHT: 70 IN

## 2024-05-17 DIAGNOSIS — M12.812 LEFT ROTATOR CUFF TEAR ARTHROPATHY: Primary | ICD-10-CM

## 2024-05-17 DIAGNOSIS — M75.102 LEFT ROTATOR CUFF TEAR ARTHROPATHY: Primary | ICD-10-CM

## 2024-05-17 DIAGNOSIS — G89.29 CHRONIC LEFT SHOULDER PAIN: ICD-10-CM

## 2024-05-17 DIAGNOSIS — M25.512 CHRONIC LEFT SHOULDER PAIN: ICD-10-CM

## 2024-05-17 DIAGNOSIS — Z96.611 STATUS POST REVERSE TOTAL ARTHROPLASTY OF RIGHT SHOULDER: ICD-10-CM

## 2024-05-17 PROCEDURE — 1123F ACP DISCUSS/DSCN MKR DOCD: CPT | Performed by: ORTHOPAEDIC SURGERY

## 2024-05-17 PROCEDURE — 73030 X-RAY EXAM OF SHOULDER: CPT | Performed by: ORTHOPAEDIC SURGERY

## 2024-05-17 PROCEDURE — 99213 OFFICE O/P EST LOW 20 MIN: CPT | Performed by: ORTHOPAEDIC SURGERY

## 2024-05-17 NOTE — PROGRESS NOTES
[facility] AMBULATORY OFFICE NOTE    Patient: William Lawrence                MRN: [id number]       SSN: [id number]  YOB: 1955        AGE: 68 y.o.        SEX: male  Body mass index is 30.28 kg/m².    PCP: Eileen Giron MD  05/17/24    Chief Complaint: Left shoulder pain    HPI: William Lawrence is a 68 y.o. male patient who returns today for his left shoulder.  He is now 3 months out from his right reverse shoulder arthroplasty and doing great.  No complaints.  His pain in his left shoulder is 7-10.  Similar problem in the left shoulder.    Past Medical History:   Diagnosis Date    Bronchitis     Chronic . Uses inhaler 3 X week PRN    Hepatitis C 2018    Treated , Tests negative    HIV (human immunodeficiency virus infection) (HCC)     Nondetectable    Osteoarthritis of right hip 01/23/2018    Palpitation 2018 1/22 Controlled with Metoprolol       Family History   Problem Relation Age of Onset    Heart Attack Maternal Grandmother     Heart Attack Paternal Aunt     Heart Attack Brother     Heart Attack Father     Heart Attack Paternal Grandmother     No Known Problems Mother        Current Outpatient Medications   Medication Sig Dispense Refill    clindamycin (CLEOCIN) 150 MG capsule Take 4 capsules by mouth 1 hour prior to appointment 4 capsule PRN    Multiple Vitamins-Minerals (MULTIVITAMIN MEN 50+ PO) Take 1 tablet by mouth Daily      fluticasone-umeclidin-vilant (TRELEGY ELLIPTA) 200-62.5-25 MCG/ACT AEPB inhaler Inhale 1 puff into the lungs daily Rinse and gargle mouth after each use 3 each 3    naloxone 4 MG/0.1ML LIQD nasal spray 1 spray by Nasal route as needed for Opioid Reversal      Azelastine HCl 137 MCG/SPRAY SOLN 2 sprays by Each Nostril route daily      fluticasone (FLONASE) 50 MCG/ACT nasal spray 2 sprays by Nasal route daily as needed for Rhinitis      ondansetron (ZOFRAN-ODT) 4 MG disintegrating tablet Place 1 tablet under the tongue every

## 2024-05-24 ENCOUNTER — HOSPITAL ENCOUNTER (OUTPATIENT)
Facility: HOSPITAL | Age: 69
End: 2024-05-24
Attending: ORTHOPAEDIC SURGERY
Payer: MEDICAID

## 2024-05-24 DIAGNOSIS — M12.812 LEFT ROTATOR CUFF TEAR ARTHROPATHY: ICD-10-CM

## 2024-05-24 DIAGNOSIS — M75.102 LEFT ROTATOR CUFF TEAR ARTHROPATHY: ICD-10-CM

## 2024-05-24 PROCEDURE — 73200 CT UPPER EXTREMITY W/O DYE: CPT

## 2024-05-28 ENCOUNTER — OFFICE VISIT (OUTPATIENT)
Age: 69
End: 2024-05-28
Payer: MEDICAID

## 2024-05-28 VITALS — HEIGHT: 70 IN | BODY MASS INDEX: 30.28 KG/M2 | RESPIRATION RATE: 16 BRPM

## 2024-05-28 DIAGNOSIS — M75.102 LEFT ROTATOR CUFF TEAR ARTHROPATHY: Primary | ICD-10-CM

## 2024-05-28 DIAGNOSIS — M12.812 LEFT ROTATOR CUFF TEAR ARTHROPATHY: Primary | ICD-10-CM

## 2024-05-28 PROCEDURE — 99214 OFFICE O/P EST MOD 30 MIN: CPT | Performed by: ORTHOPAEDIC SURGERY

## 2024-05-28 PROCEDURE — 1123F ACP DISCUSS/DSCN MKR DOCD: CPT | Performed by: ORTHOPAEDIC SURGERY

## 2024-05-28 NOTE — PROGRESS NOTES
Quit date:      Years since quittin.4    Smokeless tobacco: Never   Vaping Use    Vaping Use: Never used   Substance and Sexual Activity    Alcohol use: Not Currently     Comment: Clean 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 ,     Drug use: Not Currently     Types: Heroin     Comment: Clean since     Sexual activity: Not on file   Other Topics Concern    Not on file   Social History Narrative    Not on file     Social Determinants of Health     Financial Resource Strain: Not on file   Food Insecurity: Not on file   Transportation Needs: Not on file   Physical Activity: Not on file   Stress: Not on file   Social Connections: Not on file   Intimate Partner Violence: Not on file   Housing Stability: Not on file       REVIEW OF SYSTEMS:      No changes from previous review of systems unless noted.    PHYSICAL EXAMINATION:  Resp 16   Ht 1.778 m (5' 10\")   BMI 30.28 kg/m²   Body mass index is 30.28 kg/m².  GENERAL: Alert and oriented x3, in no acute distress.  HEENT: Normocephalic, atraumatic.    No changes in his left shoulder exam.    IMAGING:  Imaging read by myself and interpreted as follows:  CT scan of the left shoulder was reviewed which shows rotator cuff arthropathy.  No significant glenoid deformity.    ASSESSMENT & PLAN  Diagnosis: Left shoulder rotator cuff arthropathy    William Lawrence continues to have symptomatic left shoulder rotator cuff arthropathy.  CT scan is completed.  He would like to move forward with surgery like he had on the right which would be a reverse shoulder replacement.  His risk factors are history of HIV and tobacco exposure.  Plan for surgery in the near future.    Prescription medication management discussed. In the interim prior to the next visit should symptoms worsen I recommend Tylenol or OTC NSAIDs to be taken as needed as long as these medications are not medically contraindicated.     Plan was discussed in detail with patient, all questions were answered, and patient

## 2024-06-07 DIAGNOSIS — M75.102 LEFT ROTATOR CUFF TEAR ARTHROPATHY: Primary | ICD-10-CM

## 2024-06-07 DIAGNOSIS — Z01.818 PREOPERATIVE TESTING: ICD-10-CM

## 2024-06-07 DIAGNOSIS — F19.11 HISTORY OF DRUG ABUSE (HCC): ICD-10-CM

## 2024-06-07 DIAGNOSIS — M12.812 LEFT ROTATOR CUFF TEAR ARTHROPATHY: Primary | ICD-10-CM

## 2024-06-07 DIAGNOSIS — D68.9 COAGULOPATHY (HCC): ICD-10-CM

## 2024-06-10 ENCOUNTER — HOSPITAL ENCOUNTER (OUTPATIENT)
Facility: HOSPITAL | Age: 69
Discharge: HOME | End: 2024-06-13
Payer: MEDICAID

## 2024-06-10 DIAGNOSIS — Z01.810 PREOP CARDIOVASCULAR EXAM: ICD-10-CM

## 2024-06-10 DIAGNOSIS — Z01.818 PREOPERATIVE TESTING: ICD-10-CM

## 2024-06-10 DIAGNOSIS — M12.811 ROTATOR CUFF ARTHROPATHY OF RIGHT SHOULDER: ICD-10-CM

## 2024-06-10 LAB
ALBUMIN SERPL-MCNC: 3.6 G/DL (ref 3.4–5)
ALBUMIN/GLOB SERPL: 1.1 (ref 0.8–1.7)
ALP SERPL-CCNC: 112 U/L (ref 45–117)
ALT SERPL-CCNC: 59 U/L (ref 16–61)
AMPHET UR QL SCN: NEGATIVE
ANION GAP SERPL CALC-SCNC: 7 MMOL/L (ref 3–18)
APPEARANCE UR: CLEAR
APTT PPP: 32.5 SEC (ref 23–36.4)
AST SERPL-CCNC: 36 U/L (ref 10–38)
BACTERIA URNS QL MICRO: NEGATIVE /HPF
BARBITURATES UR QL SCN: NEGATIVE
BASOPHILS # BLD: 0 K/UL (ref 0–0.1)
BASOPHILS NFR BLD: 1 % (ref 0–2)
BENZODIAZ UR QL: NEGATIVE
BILIRUB SERPL-MCNC: 0.4 MG/DL (ref 0.2–1)
BILIRUB UR QL: NEGATIVE
BUN SERPL-MCNC: 17 MG/DL (ref 7–18)
BUN/CREAT SERPL: 17 (ref 12–20)
CALCIUM SERPL-MCNC: 9 MG/DL (ref 8.5–10.1)
CANNABINOIDS UR QL SCN: NEGATIVE
CHLORIDE SERPL-SCNC: 111 MMOL/L (ref 100–111)
CO2 SERPL-SCNC: 22 MMOL/L (ref 21–32)
COCAINE UR QL SCN: NEGATIVE
COLOR UR: YELLOW
CREAT SERPL-MCNC: 1.01 MG/DL (ref 0.6–1.3)
DIFFERENTIAL METHOD BLD: ABNORMAL
EOSINOPHIL NFR BLD: 7 % (ref 0–5)
EPITH CASTS URNS QL MICRO: NORMAL /LPF (ref 0–5)
ERYTHROCYTE [DISTWIDTH] IN BLOOD BY AUTOMATED COUNT: 12.9 % (ref 11.6–14.5)
GLOBULIN SER CALC-MCNC: 3.3 G/DL (ref 2–4)
GLUCOSE SERPL-MCNC: 115 MG/DL (ref 74–99)
GLUCOSE UR STRIP.AUTO-MCNC: NEGATIVE MG/DL
HCT VFR BLD AUTO: 41.4 % (ref 36–48)
HGB BLD-MCNC: 13.9 G/DL (ref 13–16)
HGB UR QL STRIP: NEGATIVE
IMM GRANULOCYTES # BLD AUTO: 0 K/UL (ref 0–0.04)
IMM GRANULOCYTES NFR BLD AUTO: 0 % (ref 0–0.5)
INR PPP: 1.2 (ref 0.9–1.1)
KETONES UR QL STRIP.AUTO: NEGATIVE MG/DL
LEUKOCYTE ESTERASE UR QL STRIP.AUTO: NEGATIVE
LYMPHOCYTES # BLD: 3.2 K/UL (ref 0.9–3.6)
LYMPHOCYTES NFR BLD: 48 % (ref 21–52)
Lab: NORMAL
MCH RBC QN AUTO: 30.2 PG (ref 24–34)
MCHC RBC AUTO-ENTMCNC: 33.6 G/DL (ref 31–37)
MCV RBC AUTO: 89.8 FL (ref 78–100)
METHADONE UR QL: NEGATIVE
MONOCYTES # BLD: 0.6 K/UL (ref 0.05–1.2)
MONOCYTES NFR BLD: 9 % (ref 3–10)
NEUTS SEG # BLD: 2.3 K/UL (ref 1.8–8)
NEUTS SEG NFR BLD: 34 % (ref 40–73)
NITRITE UR QL STRIP.AUTO: NEGATIVE
NRBC # BLD: 0 K/UL (ref 0–0.01)
NRBC BLD-RTO: 0 PER 100 WBC
OPIATES UR QL: NEGATIVE
PCP UR QL: NEGATIVE
PH UR STRIP: 6 (ref 5–8)
PLATELET # BLD AUTO: 186 K/UL (ref 135–420)
PMV BLD AUTO: 11.8 FL (ref 9.2–11.8)
POTASSIUM SERPL-SCNC: 4 MMOL/L (ref 3.5–5.5)
PROT SERPL-MCNC: 6.9 G/DL (ref 6.4–8.2)
PROT UR STRIP-MCNC: NEGATIVE MG/DL
PROTHROMBIN TIME: 15.3 SEC (ref 11.9–14.9)
RBC # BLD AUTO: 4.61 M/UL (ref 4.35–5.65)
RBC #/AREA URNS HPF: NORMAL /HPF (ref 0–5)
SODIUM SERPL-SCNC: 140 MMOL/L (ref 136–145)
SP GR UR REFRACTOMETRY: 1.02 (ref 1–1.03)
UROBILINOGEN UR QL STRIP.AUTO: 0.2 EU/DL (ref 0.2–1)
WBC URNS QL MICRO: NORMAL /HPF (ref 0–4)

## 2024-06-10 PROCEDURE — 85025 COMPLETE CBC W/AUTO DIFF WBC: CPT

## 2024-06-10 PROCEDURE — 81001 URINALYSIS AUTO W/SCOPE: CPT

## 2024-06-10 PROCEDURE — 93005 ELECTROCARDIOGRAM TRACING: CPT | Performed by: ORTHOPAEDIC SURGERY

## 2024-06-10 PROCEDURE — 80307 DRUG TEST PRSMV CHEM ANLYZR: CPT

## 2024-06-10 PROCEDURE — 85730 THROMBOPLASTIN TIME PARTIAL: CPT

## 2024-06-10 PROCEDURE — 85610 PROTHROMBIN TIME: CPT

## 2024-06-10 PROCEDURE — 36415 COLL VENOUS BLD VENIPUNCTURE: CPT

## 2024-06-10 PROCEDURE — 80053 COMPREHEN METABOLIC PANEL: CPT

## 2024-06-11 LAB
EKG ATRIAL RATE: 67 BPM
EKG DIAGNOSIS: NORMAL
EKG P AXIS: 37 DEGREES
EKG P-R INTERVAL: 172 MS
EKG Q-T INTERVAL: 384 MS
EKG QRS DURATION: 82 MS
EKG QTC CALCULATION (BAZETT): 405 MS
EKG R AXIS: 54 DEGREES
EKG T AXIS: 55 DEGREES
EKG VENTRICULAR RATE: 67 BPM

## 2024-06-25 RX ORDER — MELOXICAM 7.5 MG/1
7.5 TABLET ORAL DAILY
COMMUNITY
Start: 2024-06-04

## 2024-06-25 RX ORDER — ERGOCALCIFEROL 1.25 MG/1
50000 CAPSULE ORAL WEEKLY
COMMUNITY
Start: 2024-05-17

## 2024-06-25 RX ORDER — OXYCODONE AND ACETAMINOPHEN 7.5; 325 MG/1; MG/1
1 TABLET ORAL EVERY 6 HOURS PRN
COMMUNITY
Start: 2024-06-04

## 2024-06-25 RX ORDER — METOPROLOL TARTRATE 50 MG/1
50 TABLET, FILM COATED ORAL 2 TIMES DAILY WITH MEALS
COMMUNITY
Start: 2024-05-06

## 2024-06-26 ENCOUNTER — PREP FOR PROCEDURE (OUTPATIENT)
Age: 69
End: 2024-06-26

## 2024-06-26 ENCOUNTER — OFFICE VISIT (OUTPATIENT)
Age: 69
End: 2024-06-26

## 2024-06-26 VITALS
RESPIRATION RATE: 16 BRPM | WEIGHT: 213 LBS | HEIGHT: 70 IN | HEART RATE: 72 BPM | SYSTOLIC BLOOD PRESSURE: 131 MMHG | BODY MASS INDEX: 30.49 KG/M2 | DIASTOLIC BLOOD PRESSURE: 88 MMHG

## 2024-06-26 DIAGNOSIS — M75.102 LEFT ROTATOR CUFF TEAR ARTHROPATHY: Primary | ICD-10-CM

## 2024-06-26 DIAGNOSIS — M12.812 LEFT ROTATOR CUFF TEAR ARTHROPATHY: Primary | ICD-10-CM

## 2024-06-26 PROCEDURE — 99024 POSTOP FOLLOW-UP VISIT: CPT | Performed by: ORTHOPAEDIC SURGERY

## 2024-06-26 RX ORDER — SODIUM CHLORIDE 0.9 % (FLUSH) 0.9 %
5-40 SYRINGE (ML) INJECTION PRN
Status: CANCELLED | OUTPATIENT
Start: 2024-07-01

## 2024-06-26 RX ORDER — OXYCODONE HYDROCHLORIDE AND ACETAMINOPHEN 5; 325 MG/1; MG/1
1-2 TABLET ORAL EVERY 4 HOURS PRN
Qty: 32 TABLET | Refills: 0 | Status: SHIPPED | OUTPATIENT
Start: 2024-06-26 | End: 2024-07-03

## 2024-06-26 RX ORDER — CELECOXIB 100 MG/1
100 CAPSULE ORAL ONCE
Status: CANCELLED | OUTPATIENT
Start: 2024-07-01 | End: 2024-06-26

## 2024-06-26 RX ORDER — SODIUM CHLORIDE 0.9 % (FLUSH) 0.9 %
5-40 SYRINGE (ML) INJECTION EVERY 12 HOURS SCHEDULED
Status: CANCELLED | OUTPATIENT
Start: 2024-07-01

## 2024-06-26 RX ORDER — ACETAMINOPHEN 325 MG/1
1000 TABLET ORAL ONCE
Status: CANCELLED | OUTPATIENT
Start: 2024-07-01 | End: 2024-06-26

## 2024-06-26 RX ORDER — SODIUM CHLORIDE 9 MG/ML
INJECTION, SOLUTION INTRAVENOUS PRN
Status: CANCELLED | OUTPATIENT
Start: 2024-07-01

## 2024-06-26 NOTE — H&P (VIEW-ONLY)
[facility] AMBULATORY OFFICE  HISTORY & PHYSICAL EXAM    Patient: William Lawrence                MRN: [id number]       SSN: [id number]  YOB: 1955        AGE: 68 y.o.        SEX: male  Body mass index is 30.56 kg/m².    PCP: Eileen Giron MD  06/26/24    CHIEF COMPLAINT: Left shoulder preop    HPI: William Lawrence is a 68 y.o. male patient who is here today for his left shoulder.  He has upcoming surgery next week which is a reverse shoulder replacement.  No changes since his last visit.  Pain is a 7.    Past Medical History:   Diagnosis Date    Bronchitis     Chronic . Uses inhaler 3 X week PRN    Hepatitis C 2018    Treated , Tests negative    HIV (human immunodeficiency virus infection) (HCC)     Nondetectable    Osteoarthritis of right hip 01/23/2018    Palpitation 2018 1/22 Controlled with Metoprolol       Family History   Problem Relation Age of Onset    Heart Attack Maternal Grandmother     Heart Attack Paternal Aunt     Heart Attack Brother     Heart Attack Father     Heart Attack Paternal Grandmother     No Known Problems Mother        Current Outpatient Medications   Medication Sig Dispense Refill    oxyCODONE-acetaminophen (PERCOCET) 5-325 MG per tablet Take 1-2 tablets by mouth every 4 hours as needed for Pain for up to 7 days. Intended supply: 7 days. Take lowest dose possible to manage pain Max Daily Amount: 12 tablets 32 tablet 0    vitamin D (ERGOCALCIFEROL) 1.25 MG (62211 UT) CAPS capsule Take 1 capsule by mouth Once a week at 5 PM      metoprolol tartrate (LOPRESSOR) 50 MG tablet Take 1 tablet by mouth 2 times daily (with meals)      meloxicam (MOBIC) 7.5 MG tablet Take 1 tablet by mouth daily      oxyCODONE-acetaminophen (PERCOCET) 7.5-325 MG per tablet Take 1 tablet by mouth every 6 hours as needed for Pain. Max Daily Amount: 4 tablets      clindamycin (CLEOCIN) 150 MG capsule Take 4 capsules by mouth 1 hour prior to appointment 4  capsule PRN    Multiple Vitamins-Minerals (MULTIVITAMIN MEN 50+ PO) Take 1 tablet by mouth Daily      fluticasone-umeclidin-vilant (TRELEGY ELLIPTA) 200-62.5-25 MCG/ACT AEPB inhaler Inhale 1 puff into the lungs daily Rinse and gargle mouth after each use 3 each 3    naloxone 4 MG/0.1ML LIQD nasal spray 1 spray by Nasal route as needed for Opioid Reversal      Azelastine HCl 137 MCG/SPRAY SOLN 2 sprays by Each Nostril route daily      fluticasone (FLONASE) 50 MCG/ACT nasal spray 2 sprays by Nasal route daily as needed for Rhinitis      ondansetron (ZOFRAN-ODT) 4 MG disintegrating tablet Place 1 tablet under the tongue every 8 hours as needed for Nausea or Vomiting      sildenafil (VIAGRA) 100 MG tablet Take 1 tablet by mouth as needed for Erectile Dysfunction      albuterol sulfate HFA (PROVENTIL;VENTOLIN;PROAIR) 108 (90 Base) MCG/ACT inhaler Inhale 2 puffs into the lungs every 4 hours as needed for Wheezing or Shortness of Breath      aspirin 81 MG EC tablet Take 1 tablet by mouth daily      bictegravir-emtricitab-tenofovir alafenamide (BIKTARVY) -25 MG TABS per tablet Take 1 tablet by mouth daily      diclofenac sodium (VOLTAREN) 1 % GEL Apply 1 Application topically 4 times daily as needed for Pain      cetirizine (ZYRTEC) 10 MG tablet Take 1 tablet by mouth daily      famotidine (PEPCID) 40 MG tablet Take 1 tablet by mouth daily      triamcinolone (KENALOG) 0.1 % cream Apply 1 Application topically 2 times daily as needed       No current facility-administered medications for this visit.       Allergies   Allergen Reactions    Amoxicillin Anaphylaxis     Other reaction(s): Unknown    Doxycycline Swelling and Anaphylaxis       Past Surgical History:   Procedure Laterality Date    CERVICAL DISC SURGERY  2004    COLONOSCOPY N/A 08/28/2019    SCREENING COLONOSCOPY performed by Simeon Whitman MD at [facility] ENDOSCOPY    SHOULDER SURGERY Right 2/5/2024    RIGHT REVERSE TOTAL SHOULDER ARTHROPLASTY; [[employer]  scan were reviewed which showed rotator cuff arthropathy.    ASSESSMENT & PLAN  Diagnosis: Left shoulder rotator cuff arthropathy    William Lawrence is here today for his preoperative appointment for his upcoming left shoulder surgery.  All of his questions were answered.  Pain medication was prescribed.  Follow-up postoperatively.    Prescription medication management discussed.     Plan was discussed in detail with patient, all questions were answered, and patient voiced understanding of plan.    Electronically signed by: Armand Rodriguez MD    Note: This note was completed using voice recognition software.  Any typographical/name errors or mistakes are unintentional.

## 2024-06-26 NOTE — PROGRESS NOTES
scan were reviewed which showed rotator cuff arthropathy.    ASSESSMENT & PLAN  Diagnosis: Left shoulder rotator cuff arthropathy    William Lawrence is here today for his preoperative appointment for his upcoming left shoulder surgery.  All of his questions were answered.  Pain medication was prescribed.  Follow-up postoperatively.    Prescription medication management discussed.     Plan was discussed in detail with patient, all questions were answered, and patient voiced understanding of plan.    Electronically signed by: Armand Rodriguez MD    Note: This note was completed using voice recognition software.  Any typographical/name errors or mistakes are unintentional.

## 2024-06-28 ENCOUNTER — TELEPHONE (OUTPATIENT)
Facility: HOSPITAL | Age: 69
End: 2024-06-28

## 2024-06-28 NOTE — TELEPHONE ENCOUNTER
Call placed to patient, ID verified x 2. Patient  has decided with their surgeon to have a total shoulder replacement to decrease  pain and improve mobility . Topics discussed included surgery preparation, what to expect the day of surgery, medications, occupational therapy, and discharge planning.  It was discussed that this is considered an elective surgery and that prior to the surgery  decisions such as arranging for help at home once they are discharged needs to be made.Patient agreed to get home ready for surgery and to have a ride arranged to go home. He identifies his wife as his support system, has had a shoulder replacement before and states he was instructed that he will be spending the night. He lives in a one story home with one step to enter. He has already picked up his postoperative medications.  Instructions were given for CHG bathing. Patient will complete the procedure the morning of surgery.  Patient was reminded not to apply any deoderant, perfumes, makeup or lotions to skin the morning of surgery. He will remain NPO after midnight and  will take only the medications as instructed to take by his surgeon the morning of surgery with a sip of water. Patient verbalized that he was instructed to take his metoprolol and to bring his inhaler with him. A total shoulder replacement  education book will be provided to patient post op prior to discharge. Education regarding the importance of early and frequent ambulation to avoid surgical complications  was provided. Recommended the use of ice to assist with pain and swelling post op for 20 minutes an hour, not to be placed directly on his skin. Reviewed proper sling wear as well as postoperative showering instructions. Patient verbalized understanding of all information provided.  Opportunity was given to ask questions and phone number of the Orthopaedic   was given for any questions or concerns that may arise later.

## 2024-06-30 ENCOUNTER — ANESTHESIA EVENT (OUTPATIENT)
Facility: HOSPITAL | Age: 69
DRG: 483 | End: 2024-06-30
Payer: MEDICARE

## 2024-07-01 ENCOUNTER — HOSPITAL ENCOUNTER (INPATIENT)
Facility: HOSPITAL | Age: 69
LOS: 1 days | Discharge: HOME | DRG: 483 | End: 2024-07-01
Attending: ORTHOPAEDIC SURGERY | Admitting: ORTHOPAEDIC SURGERY
Payer: MEDICARE

## 2024-07-01 ENCOUNTER — ANESTHESIA (OUTPATIENT)
Facility: HOSPITAL | Age: 69
DRG: 483 | End: 2024-07-01
Payer: MEDICARE

## 2024-07-01 VITALS
OXYGEN SATURATION: 98 % | SYSTOLIC BLOOD PRESSURE: 129 MMHG | TEMPERATURE: 97.6 F | HEART RATE: 69 BPM | DIASTOLIC BLOOD PRESSURE: 83 MMHG | WEIGHT: 209.2 LBS | HEIGHT: 70 IN | BODY MASS INDEX: 29.95 KG/M2 | RESPIRATION RATE: 18 BRPM

## 2024-07-01 DIAGNOSIS — Z96.612 S/P SHOULDER REPLACEMENT, LEFT: Primary | ICD-10-CM

## 2024-07-01 PROCEDURE — 7100000001 HC PACU RECOVERY - ADDTL 15 MIN: Performed by: ORTHOPAEDIC SURGERY

## 2024-07-01 PROCEDURE — 7100000000 HC PACU RECOVERY - FIRST 15 MIN: Performed by: ORTHOPAEDIC SURGERY

## 2024-07-01 PROCEDURE — 0RRK00Z REPLACEMENT OF LEFT SHOULDER JOINT WITH REVERSE BALL AND SOCKET SYNTHETIC SUBSTITUTE, OPEN APPROACH: ICD-10-PCS | Performed by: ORTHOPAEDIC SURGERY

## 2024-07-01 PROCEDURE — 6360000002 HC RX W HCPCS: Performed by: ORTHOPAEDIC SURGERY

## 2024-07-01 PROCEDURE — 3700000001 HC ADD 15 MINUTES (ANESTHESIA): Performed by: ORTHOPAEDIC SURGERY

## 2024-07-01 PROCEDURE — 6370000000 HC RX 637 (ALT 250 FOR IP): Performed by: ORTHOPAEDIC SURGERY

## 2024-07-01 PROCEDURE — 2500000003 HC RX 250 WO HCPCS: Performed by: NURSE ANESTHETIST, CERTIFIED REGISTERED

## 2024-07-01 PROCEDURE — 3700000000 HC ANESTHESIA ATTENDED CARE: Performed by: ORTHOPAEDIC SURGERY

## 2024-07-01 PROCEDURE — 1100000000 HC RM PRIVATE

## 2024-07-01 PROCEDURE — 2709999900 HC NON-CHARGEABLE SUPPLY: Performed by: ORTHOPAEDIC SURGERY

## 2024-07-01 PROCEDURE — 7100000010 HC PHASE II RECOVERY - FIRST 15 MIN: Performed by: ORTHOPAEDIC SURGERY

## 2024-07-01 PROCEDURE — 80307 DRUG TEST PRSMV CHEM ANLYZR: CPT

## 2024-07-01 PROCEDURE — 7100000011 HC PHASE II RECOVERY - ADDTL 15 MIN: Performed by: ORTHOPAEDIC SURGERY

## 2024-07-01 PROCEDURE — 2580000003 HC RX 258: Performed by: ORTHOPAEDIC SURGERY

## 2024-07-01 PROCEDURE — C1776 JOINT DEVICE (IMPLANTABLE): HCPCS | Performed by: ORTHOPAEDIC SURGERY

## 2024-07-01 PROCEDURE — 23472 RECONSTRUCT SHOULDER JOINT: CPT | Performed by: ORTHOPAEDIC SURGERY

## 2024-07-01 PROCEDURE — 6370000000 HC RX 637 (ALT 250 FOR IP): Performed by: NURSE ANESTHETIST, CERTIFIED REGISTERED

## 2024-07-01 PROCEDURE — 2500000003 HC RX 250 WO HCPCS: Performed by: STUDENT IN AN ORGANIZED HEALTH CARE EDUCATION/TRAINING PROGRAM

## 2024-07-01 PROCEDURE — 2580000003 HC RX 258: Performed by: NURSE ANESTHETIST, CERTIFIED REGISTERED

## 2024-07-01 PROCEDURE — 6360000002 HC RX W HCPCS: Performed by: STUDENT IN AN ORGANIZED HEALTH CARE EDUCATION/TRAINING PROGRAM

## 2024-07-01 PROCEDURE — 3600000002 HC SURGERY LEVEL 2 BASE: Performed by: ORTHOPAEDIC SURGERY

## 2024-07-01 PROCEDURE — 6360000002 HC RX W HCPCS: Performed by: NURSE ANESTHETIST, CERTIFIED REGISTERED

## 2024-07-01 PROCEDURE — 3600000012 HC SURGERY LEVEL 2 ADDTL 15MIN: Performed by: ORTHOPAEDIC SURGERY

## 2024-07-01 PROCEDURE — C1713 ANCHOR/SCREW BN/BN,TIS/BN: HCPCS | Performed by: ORTHOPAEDIC SURGERY

## 2024-07-01 PROCEDURE — 6360000002 HC RX W HCPCS: Performed by: ANESTHESIOLOGY

## 2024-07-01 PROCEDURE — 64415 NJX AA&/STRD BRCH PLXS IMG: CPT | Performed by: STUDENT IN AN ORGANIZED HEALTH CARE EDUCATION/TRAINING PROGRAM

## 2024-07-01 PROCEDURE — 2720000010 HC SURG SUPPLY STERILE: Performed by: ORTHOPAEDIC SURGERY

## 2024-07-01 DEVICE — IMPLANTABLE DEVICE: Type: IMPLANTABLE DEVICE | Site: SHOULDER | Status: FUNCTIONAL

## 2024-07-01 DEVICE — SCREW BNE L32MM DIA4.5MM PERIPH NONLOCKING FOR MOD GLEN SYS: Type: IMPLANTABLE DEVICE | Site: SHOULDER | Status: FUNCTIONAL

## 2024-07-01 DEVICE — MODULAR POST 30MM: Type: IMPLANTABLE DEVICE | Site: SHOULDER | Status: FUNCTIONAL

## 2024-07-01 DEVICE — SCREW BNE L24MM DIA5.5MM PERIPH LOK FOR MOD GLEN SYS: Type: IMPLANTABLE DEVICE | Site: SHOULDER | Status: FUNCTIONAL

## 2024-07-01 DEVICE — SCREW BNE L32MM DIA5.5MM PERIPH LOK FOR MOD GLEN SYS: Type: IMPLANTABLE DEVICE | Site: SHOULDER | Status: FUNCTIONAL

## 2024-07-01 DEVICE — SPHERE GLEN DIA36MM +4 BASEPLT 24MM SHLDR LAT TAPR MOD UNIV: Type: IMPLANTABLE DEVICE | Site: SHOULDER | Status: FUNCTIONAL

## 2024-07-01 DEVICE — CUP HUM OD36MM +2MM OFFSET L SHLDR SUT UNIVERS REVERS: Type: IMPLANTABLE DEVICE | Site: SHOULDER | Status: FUNCTIONAL

## 2024-07-01 DEVICE — LINER HUM SM DIA36MM +3MM OFFSET SHLDR UHMWPE UNIVERS: Type: IMPLANTABLE DEVICE | Site: SHOULDER | Status: FUNCTIONAL

## 2024-07-01 DEVICE — SCREW BNE L24MM DIA4.5MM PERIPH NONLOCKING FOR MOD GLEN SYS: Type: IMPLANTABLE DEVICE | Site: SHOULDER | Status: FUNCTIONAL

## 2024-07-01 DEVICE — SET PIN MOD GLEN SYS: Type: IMPLANTABLE DEVICE | Site: SHOULDER | Status: FUNCTIONAL

## 2024-07-01 RX ORDER — PHENYLEPHRINE HCL IN 0.9% NACL 1 MG/10 ML
SYRINGE (ML) INTRAVENOUS PRN
Status: DISCONTINUED | OUTPATIENT
Start: 2024-07-01 | End: 2024-07-01 | Stop reason: SDUPTHER

## 2024-07-01 RX ORDER — LABETALOL 20 MG/4 ML (5 MG/ML) INTRAVENOUS SYRINGE
10
Status: DISCONTINUED | OUTPATIENT
Start: 2024-07-01 | End: 2024-07-01 | Stop reason: HOSPADM

## 2024-07-01 RX ORDER — FENTANYL CITRATE 50 UG/ML
100 INJECTION, SOLUTION INTRAMUSCULAR; INTRAVENOUS ONCE
Status: COMPLETED | OUTPATIENT
Start: 2024-07-01 | End: 2024-07-01

## 2024-07-01 RX ORDER — ONDANSETRON 2 MG/ML
4 INJECTION INTRAMUSCULAR; INTRAVENOUS
Status: DISCONTINUED | OUTPATIENT
Start: 2024-07-01 | End: 2024-07-01 | Stop reason: HOSPADM

## 2024-07-01 RX ORDER — DEXAMETHASONE SODIUM PHOSPHATE 4 MG/ML
INJECTION, SOLUTION INTRA-ARTICULAR; INTRALESIONAL; INTRAMUSCULAR; INTRAVENOUS; SOFT TISSUE PRN
Status: DISCONTINUED | OUTPATIENT
Start: 2024-07-01 | End: 2024-07-01 | Stop reason: SDUPTHER

## 2024-07-01 RX ORDER — ROPIVACAINE HYDROCHLORIDE 2 MG/ML
INJECTION, SOLUTION EPIDURAL; INFILTRATION; PERINEURAL
Status: COMPLETED | OUTPATIENT
Start: 2024-07-01 | End: 2024-07-01

## 2024-07-01 RX ORDER — SODIUM CHLORIDE 0.9 % (FLUSH) 0.9 %
5-40 SYRINGE (ML) INJECTION EVERY 12 HOURS SCHEDULED
Status: DISCONTINUED | OUTPATIENT
Start: 2024-07-01 | End: 2024-07-01 | Stop reason: HOSPADM

## 2024-07-01 RX ORDER — SUCCINYLCHOLINE/SOD CL,ISO/PF 100 MG/5ML
SYRINGE (ML) INTRAVENOUS PRN
Status: DISCONTINUED | OUTPATIENT
Start: 2024-07-01 | End: 2024-07-01 | Stop reason: SDUPTHER

## 2024-07-01 RX ORDER — DIPHENHYDRAMINE HYDROCHLORIDE 50 MG/ML
12.5 INJECTION INTRAMUSCULAR; INTRAVENOUS
Status: DISCONTINUED | OUTPATIENT
Start: 2024-07-01 | End: 2024-07-01 | Stop reason: HOSPADM

## 2024-07-01 RX ORDER — FAMOTIDINE 20 MG/1
20 TABLET, FILM COATED ORAL ONCE
Status: COMPLETED | OUTPATIENT
Start: 2024-07-01 | End: 2024-07-01

## 2024-07-01 RX ORDER — NEOSTIGMINE METHYLSULFATE 1 MG/ML
INJECTION, SOLUTION INTRAVENOUS PRN
Status: DISCONTINUED | OUTPATIENT
Start: 2024-07-01 | End: 2024-07-01 | Stop reason: SDUPTHER

## 2024-07-01 RX ORDER — FENTANYL CITRATE 50 UG/ML
25 INJECTION, SOLUTION INTRAMUSCULAR; INTRAVENOUS EVERY 5 MIN PRN
Status: DISCONTINUED | OUTPATIENT
Start: 2024-07-01 | End: 2024-07-01 | Stop reason: HOSPADM

## 2024-07-01 RX ORDER — HYDRALAZINE HYDROCHLORIDE 20 MG/ML
10 INJECTION INTRAMUSCULAR; INTRAVENOUS
Status: DISCONTINUED | OUTPATIENT
Start: 2024-07-01 | End: 2024-07-01 | Stop reason: HOSPADM

## 2024-07-01 RX ORDER — ROPIVACAINE HYDROCHLORIDE 5 MG/ML
30 INJECTION, SOLUTION EPIDURAL; INFILTRATION; PERINEURAL ONCE
Status: COMPLETED | OUTPATIENT
Start: 2024-07-01 | End: 2024-07-01

## 2024-07-01 RX ORDER — PROCHLORPERAZINE EDISYLATE 5 MG/ML
5 INJECTION INTRAMUSCULAR; INTRAVENOUS
Status: DISCONTINUED | OUTPATIENT
Start: 2024-07-01 | End: 2024-07-01 | Stop reason: HOSPADM

## 2024-07-01 RX ORDER — SODIUM CHLORIDE 9 MG/ML
INJECTION, SOLUTION INTRAVENOUS PRN
Status: DISCONTINUED | OUTPATIENT
Start: 2024-07-01 | End: 2024-07-01 | Stop reason: HOSPADM

## 2024-07-01 RX ORDER — LIDOCAINE HYDROCHLORIDE 20 MG/ML
INJECTION, SOLUTION EPIDURAL; INFILTRATION; INTRACAUDAL; PERINEURAL PRN
Status: DISCONTINUED | OUTPATIENT
Start: 2024-07-01 | End: 2024-07-01 | Stop reason: SDUPTHER

## 2024-07-01 RX ORDER — MIDAZOLAM HYDROCHLORIDE 2 MG/2ML
2 INJECTION, SOLUTION INTRAMUSCULAR; INTRAVENOUS ONCE
Status: COMPLETED | OUTPATIENT
Start: 2024-07-01 | End: 2024-07-01

## 2024-07-01 RX ORDER — FENTANYL CITRATE 50 UG/ML
50 INJECTION, SOLUTION INTRAMUSCULAR; INTRAVENOUS EVERY 5 MIN PRN
Status: DISCONTINUED | OUTPATIENT
Start: 2024-07-01 | End: 2024-07-01 | Stop reason: HOSPADM

## 2024-07-01 RX ORDER — GLYCOPYRROLATE 0.2 MG/ML
INJECTION INTRAMUSCULAR; INTRAVENOUS PRN
Status: DISCONTINUED | OUTPATIENT
Start: 2024-07-01 | End: 2024-07-01 | Stop reason: SDUPTHER

## 2024-07-01 RX ORDER — IPRATROPIUM BROMIDE AND ALBUTEROL SULFATE 2.5; .5 MG/3ML; MG/3ML
1 SOLUTION RESPIRATORY (INHALATION)
Status: DISCONTINUED | OUTPATIENT
Start: 2024-07-01 | End: 2024-07-01 | Stop reason: HOSPADM

## 2024-07-01 RX ORDER — ROCURONIUM BROMIDE 10 MG/ML
INJECTION, SOLUTION INTRAVENOUS PRN
Status: DISCONTINUED | OUTPATIENT
Start: 2024-07-01 | End: 2024-07-01 | Stop reason: SDUPTHER

## 2024-07-01 RX ORDER — NALOXONE HYDROCHLORIDE 0.4 MG/ML
INJECTION, SOLUTION INTRAMUSCULAR; INTRAVENOUS; SUBCUTANEOUS PRN
Status: DISCONTINUED | OUTPATIENT
Start: 2024-07-01 | End: 2024-07-01 | Stop reason: HOSPADM

## 2024-07-01 RX ORDER — SODIUM CHLORIDE, SODIUM LACTATE, POTASSIUM CHLORIDE, CALCIUM CHLORIDE 600; 310; 30; 20 MG/100ML; MG/100ML; MG/100ML; MG/100ML
INJECTION, SOLUTION INTRAVENOUS CONTINUOUS
Status: DISCONTINUED | OUTPATIENT
Start: 2024-07-01 | End: 2024-07-01 | Stop reason: HOSPADM

## 2024-07-01 RX ORDER — LIDOCAINE HYDROCHLORIDE 10 MG/ML
1 INJECTION, SOLUTION EPIDURAL; INFILTRATION; INTRACAUDAL; PERINEURAL
Status: COMPLETED | OUTPATIENT
Start: 2024-07-01 | End: 2024-07-01

## 2024-07-01 RX ORDER — PROPOFOL 10 MG/ML
INJECTION, EMULSION INTRAVENOUS PRN
Status: DISCONTINUED | OUTPATIENT
Start: 2024-07-01 | End: 2024-07-01 | Stop reason: SDUPTHER

## 2024-07-01 RX ORDER — CELECOXIB 100 MG/1
100 CAPSULE ORAL ONCE
Status: COMPLETED | OUTPATIENT
Start: 2024-07-01 | End: 2024-07-01

## 2024-07-01 RX ORDER — ACETAMINOPHEN 325 MG/1
1000 TABLET ORAL ONCE
Status: COMPLETED | OUTPATIENT
Start: 2024-07-01 | End: 2024-07-01

## 2024-07-01 RX ORDER — SODIUM CHLORIDE 0.9 % (FLUSH) 0.9 %
5-40 SYRINGE (ML) INJECTION PRN
Status: DISCONTINUED | OUTPATIENT
Start: 2024-07-01 | End: 2024-07-01 | Stop reason: HOSPADM

## 2024-07-01 RX ORDER — ONDANSETRON 2 MG/ML
INJECTION INTRAMUSCULAR; INTRAVENOUS PRN
Status: DISCONTINUED | OUTPATIENT
Start: 2024-07-01 | End: 2024-07-01 | Stop reason: SDUPTHER

## 2024-07-01 RX ADMIN — PROPOFOL 200 MG: 10 INJECTION, EMULSION INTRAVENOUS at 10:32

## 2024-07-01 RX ADMIN — FAMOTIDINE 20 MG: 20 TABLET ORAL at 08:43

## 2024-07-01 RX ADMIN — Medication 100 MCG: at 11:00

## 2024-07-01 RX ADMIN — Medication 100 MCG: at 11:49

## 2024-07-01 RX ADMIN — MIDAZOLAM 2 MG: 1 INJECTION INTRAMUSCULAR; INTRAVENOUS at 10:01

## 2024-07-01 RX ADMIN — CELECOXIB 100 MG: 100 CAPSULE ORAL at 08:43

## 2024-07-01 RX ADMIN — NEOSTIGMINE METHYLSULFATE 3 MG: 1 INJECTION, SOLUTION INTRAVENOUS at 12:09

## 2024-07-01 RX ADMIN — FENTANYL CITRATE 50 MCG: 50 INJECTION, SOLUTION INTRAMUSCULAR; INTRAVENOUS at 10:01

## 2024-07-01 RX ADMIN — Medication 100 MCG: at 11:34

## 2024-07-01 RX ADMIN — LIDOCAINE HYDROCHLORIDE 100 MG: 20 INJECTION, SOLUTION EPIDURAL; INFILTRATION; INTRACAUDAL; PERINEURAL at 10:32

## 2024-07-01 RX ADMIN — ROPIVACAINE HYDROCHLORIDE 30 ML: 5 INJECTION EPIDURAL; INFILTRATION; PERINEURAL at 10:03

## 2024-07-01 RX ADMIN — ROCURONIUM BROMIDE 30 MG: 50 INJECTION INTRAVENOUS at 10:46

## 2024-07-01 RX ADMIN — SODIUM CHLORIDE, POTASSIUM CHLORIDE, SODIUM LACTATE AND CALCIUM CHLORIDE: 600; 310; 30; 20 INJECTION, SOLUTION INTRAVENOUS at 09:31

## 2024-07-01 RX ADMIN — ACETAMINOPHEN 325MG 975 MG: 325 TABLET ORAL at 08:42

## 2024-07-01 RX ADMIN — DEXAMETHASONE SODIUM PHOSPHATE 4 MG: 4 INJECTION INTRA-ARTICULAR; INTRALESIONAL; INTRAMUSCULAR; INTRAVENOUS; SOFT TISSUE at 10:51

## 2024-07-01 RX ADMIN — Medication 1500 MG: at 10:42

## 2024-07-01 RX ADMIN — ROPIVACAINE HYDROCHLORIDE 30 ML: 2 INJECTION, SOLUTION EPIDURAL; INFILTRATION at 10:05

## 2024-07-01 RX ADMIN — Medication 100 MCG: at 11:25

## 2024-07-01 RX ADMIN — Medication 100 MG: at 10:32

## 2024-07-01 RX ADMIN — Medication 1500 MG: at 09:28

## 2024-07-01 RX ADMIN — GLYCOPYRROLATE 0.6 MG: 0.2 INJECTION INTRAMUSCULAR; INTRAVENOUS at 12:09

## 2024-07-01 RX ADMIN — Medication 100 MCG: at 11:13

## 2024-07-01 RX ADMIN — ONDANSETRON 4 MG: 2 INJECTION INTRAMUSCULAR; INTRAVENOUS at 10:51

## 2024-07-01 RX ADMIN — ROCURONIUM BROMIDE 20 MG: 50 INJECTION INTRAVENOUS at 11:36

## 2024-07-01 RX ADMIN — LIDOCAINE HYDROCHLORIDE 2 ML: 10 INJECTION, SOLUTION EPIDURAL; INFILTRATION; INTRACAUDAL; PERINEURAL at 10:02

## 2024-07-01 RX ADMIN — GLYCOPYRROLATE 0.2 MG: 0.2 INJECTION INTRAMUSCULAR; INTRAVENOUS at 11:42

## 2024-07-01 ASSESSMENT — PAIN SCALES - GENERAL
PAINLEVEL_OUTOF10: 0

## 2024-07-01 ASSESSMENT — PAIN - FUNCTIONAL ASSESSMENT
PAIN_FUNCTIONAL_ASSESSMENT: ACTIVITIES ARE NOT PREVENTED
PAIN_FUNCTIONAL_ASSESSMENT: ACTIVITIES ARE NOT PREVENTED
PAIN_FUNCTIONAL_ASSESSMENT: 0-10
PAIN_FUNCTIONAL_ASSESSMENT: ACTIVITIES ARE NOT PREVENTED

## 2024-07-01 NOTE — ANESTHESIA POSTPROCEDURE EVALUATION
Department of Anesthesiology  Postprocedure Note    Patient: William Lawrence  MRN: [id number]  YOB: 1955  Date of evaluation: 7/1/2024    Procedure Summary       Date: 07/01/24 Room / Location: [facility] MAIN 05 / [facility] MAIN OR    Anesthesia Start: 1028 Anesthesia Stop: 1239    Procedure: LEFT REVERSE TOTAL SHOULDER ARTHROPLASTY; [[employer]], SPIDER, TMAX; NERVE BLOCK; 23 HR (Left: Shoulder) Diagnosis:       Rotator cuff arthropathy, left      (Rotator cuff arthropathy, left [M12.812])    Surgeons: Armand Rodriguez MD Responsible Provider: Derek Moss MD    Anesthesia Type: General, Regional ASA Status: 2            Anesthesia Type: General, Regional    Oneyda Phase I: Oneyda Score: 10    Oneyda Phase II:      Anesthesia Post Evaluation    Patient location during evaluation: PACU  Patient participation: complete - patient participated  Level of consciousness: sleepy but conscious  Pain score: 0  Airway patency: patent  Nausea & Vomiting: no nausea and no vomiting  Cardiovascular status: blood pressure returned to baseline  Respiratory status: acceptable  Hydration status: euvolemic  Pain management: adequate    No notable events documented.

## 2024-07-01 NOTE — BRIEF OP NOTE
Brief Postoperative Note      Patient: William Lawrence  YOB: 1955  MRN: [id number]    Date of Procedure: 7/1/2024    Pre-Op Diagnosis Codes:     * Rotator cuff arthropathy, left [M12.812]    Post-Op Diagnosis: Same       Procedure(s):  LEFT REVERSE TOTAL SHOULDER ARTHROPLASTY; [[employer]], SPIDER, TMAX; NERVE BLOCK; 23 HR    Surgeon(s):  Armand Rodriguez MD    Assistant:  Surgical Assistant: Rylie Silverman    Anesthesia: General    Estimated Blood Loss (mL): less than 100     Complications: None    Specimens:   * No specimens in log *    Implants:  Implant Name Type Inv. Item Serial No.  Lot No. LRB No. Used Action   SET PIN MOD URSZULA SYS - [id number]  SET PIN MOD URSZULA SYS  [employer]WD [id number] Left 2 Implanted   BASEPLATE GLENOID FULL AUGMENT 20 DEGREE 24 MM - [id number]  BASEPLATE GLENOID FULL AUGMENT 20 DEGREE 24 MM  [employer]WD [id number] Left 1 Implanted   MODULAR POST 30MM - [id number]  MODULAR POST 30MM  [employer]Mama [id number] Left 1 Implanted   SCREW BNE L32MM DIA4.5MM PERIPH NONLOCKING FOR MOD URSZULA SYS - [id number]  SCREW BNE L32MM DIA4.5MM PERIPH NONLOCKING FOR MOD URSZULA SYS  [employer]WD [id number] Left 1 Implanted   SCREW BNE L24MM DIA4.5MM PERIPH NONLOCKING FOR MOD URSZULA SYS - [id number]  SCREW BNE L24MM DIA4.5MM PERIPH NONLOCKING FOR MOD URSZULA SYS  [employer]WD [id number] Left 1 Implanted   SCREW BNE L32MM DIA5.5MM PERIPH PHYLLIS FOR MOD URSZULA SYS - [id number]  SCREW BNE L32MM DIA5.5MM PERIPH PHYLLIS FOR MOD URSZULA SYS  [employer] [id number] Left 1 Implanted   SCREW BNE L24MM DIA5.5MM PERIPH PHYLLIS FOR MOD URSZULA SYS - [id number]  SCREW BNE L24MM DIA5.5MM PERIPH PHYLLIS FOR MOD URSZULA SYS  [employer]WD [id number] Left 1 Implanted   SPHERE URSZULA [id number] +4 BASEPLT 24MM SHLDR LAT TAPR MOD UNIV - [id number]  SPHERE URSZULA [id number] +4 BASEPLT 24MM SHLDR LAT TAPR MOD [facility]  [employer] [id number] Left 1 Implanted   LINER HUM SM [id number] +3MM OFFSET SHLDR [facility] - [id number]

## 2024-07-01 NOTE — OP NOTE
Operative Note      Patient: William Lawrence  YOB: 1955  MRN: [id number]    Date of Procedure: 7/1/2024    Pre-Op Diagnosis Codes:     * Rotator cuff arthropathy, left [M12.812]    Post-Op Diagnosis: Same       Procedure(s):  LEFT REVERSE TOTAL SHOULDER ARTHROPLASTY; [[employer]], SPIDER, TMAX; NERVE BLOCK; 23 HR    Surgeon(s):  Armand Rodriguez MD    Assistant:   Surgical Assistant: Rylie Silverman    Anesthesia: General    Estimated Blood Loss (mL): less than 100     Complications: None    Specimens:   * No specimens in log *    Implants:  Implant Name Type Inv. Item Serial No.  Lot No. LRB No. Used Action   SET PIN MOD URSZULA SYS - [id number]  SET PIN MOD URSZULA SYS  [employer]CHub[employer]amiWD [id number] Left 2 Implanted   BASEPLATE GLENOID FULL AUGMENT 20 DEGREE 24 MM - [id number]  BASEPLATE GLENOID FULL AUGMENT 20 DEGREE 24 MM  [employer]WD [id number] Left 1 Implanted   MODULAR POST 30MM - [id number]  MODULAR POST 30MM  [employer] INCNjini [id number] Left 1 Implanted   SCREW BNE L32MM DIA4.5MM PERIPH NONLOCKING FOR MOD URSZULA SYS - [id number]  SCREW BNE L32MM DIA4.5MM PERIPH NONLOCKING FOR MOD URSZULA SYS  [employer]WD [id number] Left 1 Implanted   SCREW BNE L24MM DIA4.5MM PERIPH NONLOCKING FOR MOD URSZULA SYS - [id number]  SCREW BNE L24MM DIA4.5MM PERIPH NONLOCKING FOR MOD URSZULA SYS  [employer] [id number] Left 1 Implanted   SCREW BNE L32MM DIA5.5MM PERIPH PHYLLIS FOR MOD URSZULA SYS - [id number]  SCREW BNE L32MM DIA5.5MM PERIPH PHYLLIS FOR MOD URSZULA SYS  [employer]WD [id number] Left 1 Implanted   SCREW BNE L24MM DIA5.5MM PERIPH PHYLLIS FOR MOD URSZULA SYS - [id number]  SCREW BNE L24MM DIA5.5MM PERIPH PHYLLIS FOR MOD URSZULA SYS  [employer]WD [id number] Left 1 Implanted   SPHERE URSZULA [id number] +4 BASEPLT 24MM SHLDR LAT TAPR MOD UNIV - [id number]  SPHERE URSZULA [id number] +4 BASEPLT 24MM SHLDR LAT TAPR MOD UNIV  [employer]WD [id number] Left 1 Implanted   LINER HUM SM [id number] +3MM OFFSET SHLDR [facility] - [id number]  LINER

## 2024-07-01 NOTE — ANESTHESIA PRE PROCEDURE
06/10/2024 09:51 AM    LABGLOM 82 06/10/2024 09:51 AM    LABGLOM >60 01/22/2024 04:07 PM    GLUCOSE 115 06/10/2024 09:51 AM    CALCIUM 9.0 06/10/2024 09:51 AM    BILITOT 0.4 06/10/2024 09:51 AM    ALKPHOS 112 06/10/2024 09:51 AM    AST 36 06/10/2024 09:51 AM    ALT 59 06/10/2024 09:51 AM       POC Tests: No results for input(s): \"POCGLU\", \"POCNA\", \"POCK\", \"POCCL\", \"POCBUN\", \"POCHEMO\", \"POCHCT\" in the last 72 hours.    Coags:   Lab Results   Component Value Date/Time    PROTIME 15.3 06/10/2024 09:51 AM    INR 1.2 06/10/2024 09:51 AM    APTT 32.5 06/10/2024 09:51 AM       HCG (If Applicable): No results found for: \"PREGTESTUR\", \"PREGSERUM\", \"HCG\", \"HCGQUANT\"     ABGs: No results found for: \"PHART\", \"PO2ART\", \"FTP7RKP\", \"OON9JCF\", \"BEART\", \"R2EVBQEB\"     Type & Screen (If Applicable):  No results found for: \"LABABO\"    Drug/Infectious Status (If Applicable):  No results found for: \"HIV\", \"HEPCAB\"    COVID-19 Screening (If Applicable): No results found for: \"COVID19\"        Anesthesia Evaluation  Patient summary reviewed  Airway: Mallampati: II          Dental: normal exam         Pulmonary:Negative Pulmonary ROS and normal exam                               Cardiovascular:Negative CV ROS  Exercise tolerance: good (>4 METS)  (+) hypertension:        Rhythm: regular  Rate: normal                    Neuro/Psych:   Negative Neuro/Psych ROS              GI/Hepatic/Renal: Neg GI/Hepatic/Renal ROS  (+) GERD:, hepatitis:          Endo/Other: Negative Endo/Other ROS                    Abdominal:             Vascular: negative vascular ROS.         Other Findings:       Anesthesia Plan      general and regional     ASA 2       Induction: intravenous.      Anesthetic plan and risks discussed with patient.        Attending anesthesiologist reviewed and agrees with Preprocedure content            Janusz Levine MD   7/1/2024

## 2024-07-01 NOTE — INTERVAL H&P NOTE
Update History & Physical    The patient's History and Physical of June 26, 2024 was reviewed with the patient and I examined the patient. There was no change. The surgical site was confirmed by the patient and me.     Plan: The risks, benefits, expected outcome, and alternative to the recommended procedure have been discussed with the patient. Patient understands and wants to proceed with the procedure.     Electronically signed by Armand Rodriguez MD on 7/1/2024 at 9:55 AM

## 2024-07-01 NOTE — ANESTHESIA PROCEDURE NOTES
Peripheral Block    Patient location during procedure: pre-op  Reason for block: post-op pain management and at surgeon's request  Start time: 7/1/2024 10:00 AM  End time: 7/1/2024 10:07 AM  Staffing  Anesthesiologist: Janusz Levine MD  Performed by: Janusz Levine MD  Authorized by: Janusz Levine MD    Preanesthetic Checklist  Completed: patient identified, IV checked, site marked, risks and benefits discussed, surgical/procedural consents, equipment checked, pre-op evaluation, timeout performed, anesthesia consent given, oxygen available, monitors applied/VS acknowledged, fire risk safety assessment completed and verbalized and blood product R/B/A discussed and consented  Peripheral Block   Patient position: supine  Prep: ChloraPrep  Provider prep: mask  Patient monitoring: continuous pulse ox, frequent blood pressure checks, IV access, oxygen and responsive to questions  Block type: Brachial plexus  Interscalene  Laterality: left  Injection technique: single-shot  Guidance: Doppler guided  Local infiltration: ropivacaine  Infiltration strength: 0.5 %  Local infiltration: ropivacaine  Dose: 30 mL    Needle   Needle type: insulated echogenic nerve stimulator needle   Needle gauge: 22 G  Needle localization: ultrasound guidance and nerve stimulator  Assessment   Injection assessment: negative aspiration for heme, no paresthesia on injection, local visualized surrounding nerve on ultrasound and no intravascular symptoms  Slow fractionated injection: yes  Hemodynamics: stable  Outcomes: uncomplicated    Medications Administered  ropivacaine (NAROPIN) injection 0.2% - Perineural   30 mL - 7/1/2024 10:05:00 AM

## 2024-07-01 NOTE — PERIOP NOTE
Patient /Family /Designee has been informed that [facility] is not responsible for patient belongings per policy and the signed [facility] Patient Agreement document.  Personal items should be sent home or checked in with security.  Patient /Family /Designee selected the following action:                            []  Send personal items home with a family member or friend                                                 []  Check in personal items with security, excluding clothing                            [x]  Maintain personal items at the bedside, against recommendation                                 by Gurpreet Menendez [facility]     *family not present to take belongings*

## 2024-07-10 ENCOUNTER — OFFICE VISIT (OUTPATIENT)
Age: 69
End: 2024-07-10
Payer: MEDICARE

## 2024-07-10 DIAGNOSIS — Z96.612 S/P REVERSE TOTAL SHOULDER ARTHROPLASTY, LEFT: Primary | ICD-10-CM

## 2024-07-10 PROCEDURE — 73030 X-RAY EXAM OF SHOULDER: CPT | Performed by: ORTHOPAEDIC SURGERY

## 2024-07-10 PROCEDURE — 99024 POSTOP FOLLOW-UP VISIT: CPT | Performed by: ORTHOPAEDIC SURGERY

## 2024-07-10 RX ORDER — OXYCODONE HYDROCHLORIDE AND ACETAMINOPHEN 5; 325 MG/1; MG/1
1 TABLET ORAL EVERY 6 HOURS PRN
Qty: 28 TABLET | Refills: 0 | Status: SHIPPED | OUTPATIENT
Start: 2024-07-10 | End: 2024-07-17

## 2024-07-10 NOTE — PROGRESS NOTES
after each use (Patient not taking: Reported on 7/1/2024) 3 each 3    naloxone 4 MG/0.1ML LIQD nasal spray 1 spray by Nasal route as needed for Opioid Reversal      Azelastine HCl 137 MCG/SPRAY SOLN 2 sprays by Each Nostril route daily      fluticasone (FLONASE) 50 MCG/ACT nasal spray 2 sprays by Nasal route daily as needed for Rhinitis      ondansetron (ZOFRAN-ODT) 4 MG disintegrating tablet Place 1 tablet under the tongue every 8 hours as needed for Nausea or Vomiting      sildenafil (VIAGRA) 100 MG tablet Take 1 tablet by mouth as needed for Erectile Dysfunction      albuterol sulfate HFA (PROVENTIL;VENTOLIN;PROAIR) 108 (90 Base) MCG/ACT inhaler Inhale 2 puffs into the lungs every 4 hours as needed for Wheezing or Shortness of Breath      aspirin 81 MG EC tablet Take 1 tablet by mouth daily      bictegravir-emtricitab-tenofovir alafenamide (BIKTARVY) -25 MG TABS per tablet Take 1 tablet by mouth daily      diclofenac sodium (VOLTAREN) 1 % GEL Apply 1 Application topically 4 times daily as needed for Pain      cetirizine (ZYRTEC) 10 MG tablet Take 1 tablet by mouth daily      famotidine (PEPCID) 40 MG tablet Take 1 tablet by mouth daily      triamcinolone (KENALOG) 0.1 % cream Apply 1 Application topically 2 times daily as needed       No current facility-administered medications for this visit.       Allergies   Allergen Reactions    Amoxicillin Anaphylaxis     Other reaction(s): Unknown    Doxycycline Swelling and Anaphylaxis    Diclofenac      Other Reaction(s): swelling only the tablets       Past Surgical History:   Procedure Laterality Date    CERVICAL DISC SURGERY  2004    COLONOSCOPY N/A 08/28/2019    SCREENING COLONOSCOPY performed by Simeon Whitman MD at [facility] ENDOSCOPY    SHOULDER SURGERY Right 2/5/2024    RIGHT REVERSE TOTAL SHOULDER ARTHROPLASTY; [[employer]], SPIDER, TMAX; NERVE BLOCK; 23 HR performed by Armand Rodriguez MD at [facility] MAIN OR    SHOULDER SURGERY Left 7/1/2024    LEFT

## 2024-07-24 ENCOUNTER — OFFICE VISIT (OUTPATIENT)
Age: 69
End: 2024-07-24
Payer: MEDICARE

## 2024-07-24 DIAGNOSIS — Z96.612 S/P REVERSE TOTAL SHOULDER ARTHROPLASTY, LEFT: Primary | ICD-10-CM

## 2024-07-24 PROCEDURE — 73030 X-RAY EXAM OF SHOULDER: CPT | Performed by: ORTHOPAEDIC SURGERY

## 2024-07-24 PROCEDURE — 99024 POSTOP FOLLOW-UP VISIT: CPT | Performed by: ORTHOPAEDIC SURGERY

## 2024-07-24 RX ORDER — OXYCODONE AND ACETAMINOPHEN 7.5; 325 MG/1; MG/1
1 TABLET ORAL EVERY 6 HOURS PRN
Qty: 28 TABLET | Refills: 0 | Status: SHIPPED | OUTPATIENT
Start: 2024-07-24 | End: 2024-07-31

## 2024-07-24 NOTE — PROGRESS NOTES
[facility] AMBULATORY OFFICE NOTE    Patient: William Lawrence                MRN: [id number]       SSN: [id number]  YOB: 1955        AGE: 68 y.o.        SEX: male  There is no height or weight on file to calculate BMI.    PCP: Eileen Giron MD  07/24/24    Chief Complaint: Left shoulder follow-up    HPI: William Lawrence is a 68 y.o. male patient who returns today for his left shoulder.  3 weeks postop.    Past Medical History:   Diagnosis Date    Bronchitis     Chronic . Uses inhaler 3 X week PRN    Hepatitis C 2018    Treated , Tests negative    HIV (human immunodeficiency virus infection) (HCC)     Nondetectable    Osteoarthritis of right hip 01/23/2018    Palpitation 2018 1/22 Controlled with Metoprolol       Family History   Problem Relation Age of Onset    Heart Attack Maternal Grandmother     Heart Attack Paternal Aunt     Heart Attack Brother     Heart Attack Father     Heart Attack Paternal Grandmother     No Known Problems Mother        Current Outpatient Medications   Medication Sig Dispense Refill    oxyCODONE-acetaminophen (PERCOCET) 7.5-325 MG per tablet Take 1 tablet by mouth every 6 hours as needed for Pain for up to 7 days. Max Daily Amount: 4 tablets 28 tablet 0    vitamin D (ERGOCALCIFEROL) 1.25 MG (73864 UT) CAPS capsule Take 1 capsule by mouth Once a week at 5 PM      metoprolol tartrate (LOPRESSOR) 50 MG tablet Take 1 tablet by mouth 2 times daily (with meals)      meloxicam (MOBIC) 7.5 MG tablet Take 1 tablet by mouth daily (Patient not taking: Reported on 7/1/2024)      clindamycin (CLEOCIN) 150 MG capsule Take 4 capsules by mouth 1 hour prior to appointment 4 capsule PRN    Multiple Vitamins-Minerals (MULTIVITAMIN MEN 50+ PO) Take 1 tablet by mouth Daily      fluticasone-umeclidin-vilant (TRELEGY ELLIPTA) 200-62.5-25 MCG/ACT AEPB inhaler Inhale 1 puff into the lungs daily Rinse and gargle mouth after each use (Patient not taking:

## 2024-08-06 ENCOUNTER — TELEPHONE (OUTPATIENT)
Age: 69
End: 2024-08-06

## 2024-08-06 DIAGNOSIS — Z96.612 S/P REVERSE TOTAL SHOULDER ARTHROPLASTY, LEFT: Primary | ICD-10-CM

## 2024-08-06 RX ORDER — OXYCODONE AND ACETAMINOPHEN 7.5; 325 MG/1; MG/1
1 TABLET ORAL EVERY 6 HOURS PRN
Qty: 28 TABLET | Refills: 0 | Status: SHIPPED | OUTPATIENT
Start: 2024-08-06 | End: 2024-08-13

## 2024-08-06 NOTE — TELEPHONE ENCOUNTER
Patient called in states that he starts therapy tomorrow and needs a refill on the prescription listed below.        oxyCODONE-acetaminophen (PERCOCET) 7.5-325 MG per tablet         [facility]   [address] [place] [id number]  Phone: [phone]  Fax: [phone]    negative No oral lesions; no gross abnormalities

## 2024-08-08 ENCOUNTER — APPOINTMENT (OUTPATIENT)
Facility: HOSPITAL | Age: 69
End: 2024-08-08
Payer: MEDICARE

## 2024-08-08 ENCOUNTER — TELEPHONE (OUTPATIENT)
Facility: HOSPITAL | Age: 69
End: 2024-08-08

## 2024-08-08 NOTE — TELEPHONE ENCOUNTER
Order placed per [facility] Patient req to cancel due to wife not being able to be with him at his appt to assist with paperwork. Archana for 8/22.

## 2024-08-19 ENCOUNTER — TELEPHONE (OUTPATIENT)
Age: 69
End: 2024-08-19

## 2024-08-19 NOTE — TELEPHONE ENCOUNTER
I called & spoke with Referral Co-ordinator,Ms. Patti Kvng with [facility] (P#[id number]) requesting clarification of referral sent. Referral sent reflects needs to be seen for \"pain mgmnt of chronic pain for left reverse total shoulder arthroplasty\" as completed by Dr Rodriguez on 7/1/2024. Appears pt attended one P/O appt after his sx and has not followed up with us. Pt was recently seen  in Jun 2024 by [facility].. Need to know if pt needs to follow up with pain mgmnt provider [facility] for his medication protocol due to pain mgmnt contract, or if he needs to follow up with [facility] for con't of left shoulder pain. Patti will asked Pat Connell to clarify and call me back. I provided my direct line [phone].

## 2024-08-22 ENCOUNTER — HOSPITAL ENCOUNTER (OUTPATIENT)
Facility: HOSPITAL | Age: 69
Setting detail: RECURRING SERIES
Discharge: HOME | End: 2024-08-25
Payer: MEDICARE

## 2024-08-22 PROCEDURE — 97140 MANUAL THERAPY 1/> REGIONS: CPT | Performed by: PHYSICAL THERAPIST

## 2024-08-22 PROCEDURE — 97162 PT EVAL MOD COMPLEX 30 MIN: CPT | Performed by: PHYSICAL THERAPIST

## 2024-08-22 NOTE — THERAPY EVALUATION
[facility]  [address] [address] Ph: [phone] Fx: [phone]  Plan of Care / Statement of Necessity for Physical Therapy Services     Patient Name: William Lawrence : 1955   Medical   Diagnosis: Left shoulder pain [M25.512]  Treatment Diagnosis:   M25.512  LEFT SHOULDER PAIN    Onset Date: 2024     Referral Source: Armand Rodriguez MD Start of Care (SOC): 2024   Prior Hospitalization: See medical history Provider #: [id number]   Prior Level of Function:  He had right TSA earlier this year and was still having pain in the shoulder.  Could not do much.    Comorbidities:  Respiratory disorders, Musculoskeletal disorders, and Other: prior right TSA, current left TSA     Assessment / key information:  Patient with signs and symptoms consistent with left shoulder pain s/p left TSA almost 8 weeks ago.  He has a CC of severe pain and limited motion in his left shoulder.  He also reports that his right shoulder remains very painful and he is unable to do much with that also.  His AROM in flex 0-35, scaption 0-42.  Pain levels range from 6/10-10+/10.  He has moderate tenderness along the left shoulder surgical scar.  Functionally, he notes difficulty with self care and any use of his left arm.     Patient will benefit from a program of skilled physical therapy to include therapeutic exercises to address strength deficits, therapeutic activities to improve functional mobility, neuromuscular reeducation to address balance, coordination and proprioception, manual therapy to address ROM and tissue extensibility and modalities as indicated.  All questions were answered.      Post operative: URGENT: Patient was evaluated for a post operative condition and early physical therapy intervention is critical to positive outcomes.  Insurance authorization should be expedited to prevent delay in treatment.      Evaluation Complexity:  History:  MEDIUM

## 2024-08-22 NOTE — PROGRESS NOTES
PT DAILY TREATMENT NOTE/SHOULDER EVAL     Patient Name: William Lawrence    Date: 2024    : 1955  Insurance: Payor: [facility] MEDICARE / Plan: UNITEDHEALTHCARE DUAL COMPLETE / Product Type: *No Product type* /      Patient  verified yes     Visit #   Current / Total 1 24   Time   In / Out 10:20 11:00   Pain   In / Out 6 20!   Subjective Functional Status/Changes: Patient states he has been having chronic left shoulder pain.  He had a left reverse total shoulder surgery on 2024.  His CC is lack of motion and severe pain.       Treatment Area: Left shoulder pain [M25.512]    SUBJECTIVE  Pain Level (0-10 scale): 6/10 now; 6/10 at best; 10+/10 at worst.  [x]constant []intermittent [x]improving []worsening []no change since onset    Subjective functional status/changes:     PLOF: He had right TSA earlier this year and was still having pain in the shoulder.  Could not do much.  Limitations to PLOF: Having difficulty with self care and pain with limited ROM.  Mechanism of Injury: Rotator cuff arthropathy leading to left shoulder surgery  Current symptoms/Complaints: Patient with c/o constant left shoulder pain.  He also notes his right shoulder still is painful and limited.  He was in a sling for about a week and a half and has been out of the sling now for a few weeks.  Notes he gets stiff a lot.  Taking muscle relaxers.  Previous Treatment/Compliance: None  PMHx/Surgical Hx: Right reverse TSA, Left reverse TSA 2024. Bronchitis   Work Hx: Retired.  Pt Goals: \"To get it back to where I can use it\".  Barriers: [x]pain []financial []time []transportation []other  Cognition: A & O x 3    Other:    OBJECTIVE/EXAMINATION  Domestic Life: Lives with his wife  Activity/Recreational Limitations: Limited due to painful left and right shoulders.  Mobility: ambulates FWB with a mild gait deviation due to his right OSCAR  Self Care: Needs help with bathing and dressing.    25 min []Eval - untimed

## 2024-08-28 ENCOUNTER — OFFICE VISIT (OUTPATIENT)
Age: 69
End: 2024-08-28
Payer: MEDICARE

## 2024-08-28 DIAGNOSIS — Z96.612 S/P REVERSE TOTAL SHOULDER ARTHROPLASTY, LEFT: Primary | ICD-10-CM

## 2024-08-28 PROCEDURE — 99024 POSTOP FOLLOW-UP VISIT: CPT | Performed by: ORTHOPAEDIC SURGERY

## 2024-08-28 PROCEDURE — 73030 X-RAY EXAM OF SHOULDER: CPT | Performed by: ORTHOPAEDIC SURGERY

## 2024-08-28 RX ORDER — OXYCODONE AND ACETAMINOPHEN 5; 325 MG/1; MG/1
1 TABLET ORAL EVERY 6 HOURS PRN
Qty: 28 TABLET | Refills: 0 | Status: SHIPPED | OUTPATIENT
Start: 2024-08-28 | End: 2024-09-04

## 2024-08-28 NOTE — PROGRESS NOTES
fluticasone-umeclidin-vilant (TRELEGY ELLIPTA) 200-62.5-25 MCG/ACT AEPB inhaler Inhale 1 puff into the lungs daily Rinse and gargle mouth after each use (Patient not taking: Reported on 7/1/2024) 3 each 3    naloxone 4 MG/0.1ML LIQD nasal spray 1 spray by Nasal route as needed for Opioid Reversal      Azelastine HCl 137 MCG/SPRAY SOLN 2 sprays by Each Nostril route daily      fluticasone (FLONASE) 50 MCG/ACT nasal spray 2 sprays by Nasal route daily as needed for Rhinitis      ondansetron (ZOFRAN-ODT) 4 MG disintegrating tablet Place 1 tablet under the tongue every 8 hours as needed for Nausea or Vomiting      sildenafil (VIAGRA) 100 MG tablet Take 1 tablet by mouth as needed for Erectile Dysfunction      albuterol sulfate HFA (PROVENTIL;VENTOLIN;PROAIR) 108 (90 Base) MCG/ACT inhaler Inhale 2 puffs into the lungs every 4 hours as needed for Wheezing or Shortness of Breath      aspirin 81 MG EC tablet Take 1 tablet by mouth daily      bictegravir-emtricitab-tenofovir alafenamide (BIKTARVY) -25 MG TABS per tablet Take 1 tablet by mouth daily      diclofenac sodium (VOLTAREN) 1 % GEL Apply 1 Application topically 4 times daily as needed for Pain      cetirizine (ZYRTEC) 10 MG tablet Take 1 tablet by mouth daily      famotidine (PEPCID) 40 MG tablet Take 1 tablet by mouth daily      triamcinolone (KENALOG) 0.1 % cream Apply 1 Application topically 2 times daily as needed       No current facility-administered medications for this visit.       Allergies   Allergen Reactions    Amoxicillin Anaphylaxis     Other reaction(s): Unknown    Doxycycline Swelling and Anaphylaxis    Diclofenac      Other Reaction(s): swelling only the tablets       Past Surgical History:   Procedure Laterality Date    CERVICAL DISC SURGERY  2004    COLONOSCOPY N/A 08/28/2019    SCREENING COLONOSCOPY performed by Simeon Whitman MD at [facility] ENDOSCOPY    SHOULDER SURGERY Right 2/5/2024    RIGHT REVERSE TOTAL SHOULDER ARTHROPLASTY; [[facility]  [facility]], SPIDER, TMAX; NERVE BLOCK; 23 HR performed by Armand Rodriguez MD at [facility] MAIN OR    SHOULDER SURGERY Left 2024    LEFT REVERSE TOTAL SHOULDER ARTHROPLASTY; [[employer]], SPIDER, TMAX; NERVE BLOCK; 23 HR performed by Armand Rodriguez MD at [facility] MAIN OR    TOTAL HIP ARTHROPLASTY Right 2017       Social History     Socioeconomic History    Marital status:      Spouse name: Not on file    Number of children: Not on file    Years of education: Not on file    Highest education level: Not on file   Occupational History    Not on file   Tobacco Use    Smoking status: Former     Current packs/day: 0.00     Average packs/day: 0.3 packs/day for 22.0 years (5.5 ttl pk-yrs)     Types: Cigarettes     Start date:      Quit date:      Years since quittin.6    Smokeless tobacco: Never   Vaping Use    Vaping status: Never Used   Substance and Sexual Activity    Alcohol use: Not Currently     Comment: Clean 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 ,     Drug use: Not Currently     Types: Heroin     Comment: Clean since     Sexual activity: Not on file   Other Topics Concern    Not on file   Social History Narrative    Not on file     Social Determinants of Health     Financial Resource Strain: Not on file   Food Insecurity: Not on file   Transportation Needs: Not on file   Physical Activity: Not on file   Stress: Not on file   Social Connections: Patient Declined (2024)    Social Connections ([facility] HRSN)     If for any reason you need help with day-to-day activities such as bathing, preparing meals, shopping, managing finances, etc., do you get the help you need?: Not on file   Intimate Partner Violence: Not on file   Housing Stability: Not on file       REVIEW OF SYSTEMS:      No changes from previous review of systems unless noted.    PHYSICAL EXAMINATION:  There were no vitals taken for this visit.  There is no height or weight on file to calculate BMI.  GENERAL: Alert and oriented x3, in no

## 2024-08-30 ENCOUNTER — HOSPITAL ENCOUNTER (OUTPATIENT)
Facility: HOSPITAL | Age: 69
Setting detail: RECURRING SERIES
End: 2024-08-30
Payer: MEDICARE

## 2024-08-30 PROCEDURE — 97140 MANUAL THERAPY 1/> REGIONS: CPT

## 2024-08-30 PROCEDURE — 97110 THERAPEUTIC EXERCISES: CPT

## 2024-08-30 PROCEDURE — 97530 THERAPEUTIC ACTIVITIES: CPT

## 2024-08-30 NOTE — PROGRESS NOTES
PHYSICAL / OCCUPATIONAL THERAPY - DAILY TREATMENT NOTE    Patient Name: William Lawrence    Date: 2024    : 1955  Insurance: Payor: [facility] MEDICARE / Plan: UNITEDHEALTHCARE DUAL COMPLETE / Product Type: *No Product type* /      Patient  verified Yes     Visit #   Current / Total 2 24   Time   In / Out 7:40 8:19   Pain   In / Out 5 7   Subjective Functional Status/Changes: Pt states he is doing his exercises 1x per day. Pt notes his shoulder hurts a lot, and \"it doesn't take much to agitate it\".      TREATMENT AREA =  Left shoulder pain [M25.512]     OBJECTIVE    Therapeutic Procedures:    62403 Therapeutic Exercise (timed):  increase ROM, strength, coordination, balance, and proprioception to improve patient's ability to progress to PLOF and address remaining functional goals.   Tx Min Billable or 1:1 Min   (if diff from Tx Min) Details:   15  See flow sheet as applicable     54889 Manual Therapy (timed):  decrease pain, increase ROM, and increase tissue extensibility to improve patient's ability to progress to PLOF and address remaining functional goals.  The manual therapy interventions were performed at a separate and distinct time from the therapeutic activities interventions .   Tx Min Billable or 1:1 Min   (if diff from Tx Min) Details:   10  shoulder flexion and ER PROM      41101 Therapeutic Activity (timed):  use of dynamic activities replicating functional movements to increase ROM, strength, coordination, balance, and proprioception in order to improve patient's ability to progress to PLOF and address remaining functional goals.   Tx Min Billable or 1:1 Min   (if diff from Tx Min) Details:   14  See flow sheet as applicable       39  [facility] Totals Reminder: bill using total billable min of TIMED therapeutic procedures (example: do not include dry needle or estim unattended, both untimed codes, in totals to left)  8-22 min = 1 unit; 23-37 min = 2 units; 38-52 min = 3 units; 53-67 min = 4

## 2024-09-05 ENCOUNTER — HOSPITAL ENCOUNTER (OUTPATIENT)
Facility: HOSPITAL | Age: 69
Setting detail: RECURRING SERIES
Discharge: HOME | End: 2024-09-08
Payer: MEDICARE

## 2024-09-05 PROCEDURE — 97140 MANUAL THERAPY 1/> REGIONS: CPT

## 2024-09-05 PROCEDURE — 97110 THERAPEUTIC EXERCISES: CPT

## 2024-09-05 PROCEDURE — 97112 NEUROMUSCULAR REEDUCATION: CPT

## 2024-09-06 ENCOUNTER — HOSPITAL ENCOUNTER (OUTPATIENT)
Facility: HOSPITAL | Age: 69
Setting detail: RECURRING SERIES
Discharge: HOME | End: 2024-09-09
Payer: MEDICARE

## 2024-09-06 PROCEDURE — 97530 THERAPEUTIC ACTIVITIES: CPT

## 2024-09-06 PROCEDURE — 97112 NEUROMUSCULAR REEDUCATION: CPT

## 2024-09-06 PROCEDURE — 97110 THERAPEUTIC EXERCISES: CPT

## 2024-09-09 ENCOUNTER — HOSPITAL ENCOUNTER (OUTPATIENT)
Facility: HOSPITAL | Age: 69
Setting detail: RECURRING SERIES
Discharge: HOME | End: 2024-09-12
Payer: MEDICARE

## 2024-09-09 PROCEDURE — 97110 THERAPEUTIC EXERCISES: CPT

## 2024-09-09 PROCEDURE — 97112 NEUROMUSCULAR REEDUCATION: CPT

## 2024-09-09 PROCEDURE — 97140 MANUAL THERAPY 1/> REGIONS: CPT

## 2024-09-11 ENCOUNTER — HOSPITAL ENCOUNTER (OUTPATIENT)
Facility: HOSPITAL | Age: 69
Setting detail: RECURRING SERIES
Discharge: HOME | End: 2024-09-14
Payer: MEDICARE

## 2024-09-11 PROCEDURE — 97110 THERAPEUTIC EXERCISES: CPT

## 2024-09-11 PROCEDURE — 97112 NEUROMUSCULAR REEDUCATION: CPT

## 2024-09-11 PROCEDURE — 97530 THERAPEUTIC ACTIVITIES: CPT

## 2024-09-16 ENCOUNTER — HOSPITAL ENCOUNTER (OUTPATIENT)
Facility: HOSPITAL | Age: 69
Setting detail: RECURRING SERIES
Discharge: HOME | End: 2024-09-19
Payer: MEDICARE

## 2024-09-16 PROCEDURE — 97140 MANUAL THERAPY 1/> REGIONS: CPT

## 2024-09-16 PROCEDURE — 97112 NEUROMUSCULAR REEDUCATION: CPT

## 2024-09-16 PROCEDURE — 97110 THERAPEUTIC EXERCISES: CPT

## 2024-09-18 ENCOUNTER — HOSPITAL ENCOUNTER (OUTPATIENT)
Facility: HOSPITAL | Age: 69
Setting detail: RECURRING SERIES
Discharge: HOME | End: 2024-09-21
Payer: MEDICARE

## 2024-09-18 PROCEDURE — 97530 THERAPEUTIC ACTIVITIES: CPT

## 2024-09-18 PROCEDURE — 97112 NEUROMUSCULAR REEDUCATION: CPT

## 2024-09-18 PROCEDURE — 97110 THERAPEUTIC EXERCISES: CPT

## 2024-09-23 ENCOUNTER — HOSPITAL ENCOUNTER (OUTPATIENT)
Facility: HOSPITAL | Age: 69
Setting detail: RECURRING SERIES
Discharge: HOME | End: 2024-09-26
Payer: MEDICARE

## 2024-09-23 PROCEDURE — 97530 THERAPEUTIC ACTIVITIES: CPT

## 2024-09-23 PROCEDURE — 97112 NEUROMUSCULAR REEDUCATION: CPT

## 2024-09-23 PROCEDURE — 97110 THERAPEUTIC EXERCISES: CPT

## 2024-09-25 ENCOUNTER — HOSPITAL ENCOUNTER (OUTPATIENT)
Facility: HOSPITAL | Age: 69
Setting detail: RECURRING SERIES
Discharge: HOME | End: 2024-09-28
Payer: MEDICARE

## 2024-09-25 PROCEDURE — 97530 THERAPEUTIC ACTIVITIES: CPT

## 2024-09-25 PROCEDURE — 97110 THERAPEUTIC EXERCISES: CPT

## 2024-09-25 PROCEDURE — 97112 NEUROMUSCULAR REEDUCATION: CPT

## 2024-09-30 ENCOUNTER — TELEPHONE (OUTPATIENT)
Facility: HOSPITAL | Age: 69
End: 2024-09-30

## 2024-09-30 ENCOUNTER — HOSPITAL ENCOUNTER (OUTPATIENT)
Facility: HOSPITAL | Age: 69
Setting detail: RECURRING SERIES
Discharge: HOME | End: 2024-10-03
Payer: MEDICARE

## 2024-09-30 PROCEDURE — 97110 THERAPEUTIC EXERCISES: CPT

## 2024-09-30 PROCEDURE — 97530 THERAPEUTIC ACTIVITIES: CPT

## 2024-09-30 NOTE — PROGRESS NOTES
PHYSICAL / OCCUPATIONAL THERAPY - DAILY TREATMENT NOTE    Patient Name: William Lawrence    Date: 2024    : 1955  Insurance: Payor: [facility] MEDICARE / Plan: UNITEDHEALTHCARE DUAL COMPLETE / Product Type: *No Product type* /      Patient  verified Yes     Visit #   Current / Total 11 24   Time   In / Out 8:58 9:35   Pain   In / Out 3/10 8/10   Subjective Functional Status/Changes: Pt reports his shoulder is sore from doing his exercises. \"I workout 7 days a week, I'm trying to get this thing right\". States he has been using his L UE more throughout the day.      TREATMENT AREA =  Left shoulder pain [M25.512]     OBJECTIVE    Therapeutic Procedures:    Therapeutic Procedures:  07314 Therapeutic Exercise (timed):  increase ROM, strength, coordination, balance, and proprioception to improve patient's ability to progress to PLOF and address remaining functional goals.   Tx Min Billable or 1:1 Min   (if diff from Tx Min) Details:   20 20 See flow sheet as applicable      59573 Therapeutic Activity (timed):  use of dynamic activities replicating functional movements to increase ROM, strength, coordination, balance, and proprioception in order to improve patient's ability to progress to PLOF and address remaining functional goals.   Tx Min Billable or 1:1 Min   (if diff from Tx Min) Details:   17 17 See flow sheet as applicable         37 37 [facility] Totals Reminder: bill using total billable min of TIMED therapeutic procedures (example: do not include dry needle or estim unattended, both untimed codes, in totals to left)  8-22 min = 1 unit; 23-37 min = 2 units; 38-52 min = 3 units; 53-67 min = 4 units; 68-82 min = 5 units   Total Total             [x]  Patient Education billed concurrently with other procedures   [x] Review HEP    [] Progressed/Changed HEP, detail:    [] Other detail:       Objective Information/Functional Measures/Assessment    -progressed to standing ball vs wall circles; pt challenged with

## 2024-10-02 ENCOUNTER — HOSPITAL ENCOUNTER (OUTPATIENT)
Facility: HOSPITAL | Age: 69
Setting detail: RECURRING SERIES
Discharge: HOME | End: 2024-10-05
Payer: MEDICARE

## 2024-10-02 ENCOUNTER — APPOINTMENT (OUTPATIENT)
Facility: HOSPITAL | Age: 69
End: 2024-10-02
Payer: MEDICARE

## 2024-10-02 PROCEDURE — 97530 THERAPEUTIC ACTIVITIES: CPT

## 2024-10-02 PROCEDURE — 97110 THERAPEUTIC EXERCISES: CPT

## 2024-10-02 PROCEDURE — 97112 NEUROMUSCULAR REEDUCATION: CPT

## 2024-10-02 NOTE — PROGRESS NOTES
PHYSICAL / OCCUPATIONAL THERAPY - DAILY TREATMENT NOTE    Patient Name: William Lawrence    Date: 10/2/2024    : 1955  Insurance: Payor: [facility] MEDICARE / Plan: UNITEDHEALTHCARE DUAL COMPLETE / Product Type: *No Product type* /      Patient  verified Yes     Visit #   Current / Total 12 24   Time   In / Out 740 818   Pain   In / Out 0 10   Subjective Functional Status/Changes: Patient states he is able to use his arm to put his coffee cup away at home on shelf that is shoulder height.      TREATMENT AREA =  Left shoulder pain [M25.512]     OBJECTIVE    Therapeutic Procedures:  76379 Therapeutic Exercise (timed):  increase ROM, strength, coordination, balance, and proprioception to improve patient's ability to progress to PLOF and address remaining functional goals.   Tx Min Billable or 1:1 Min   (if diff from Tx Min) Details:   15   See flow sheet as applicable      01789 Neuromuscular Re-Education (timed):  improve balance, coordination, kinesthetic sense, posture, core stability and proprioception to improve patient's ability to develop conscious control of individual muscles and awareness of position of extremities in order to progress to PLOF and address remaining functional goals.   Tx Min Billable or 1:1 Min   (if diff from Tx Min) Details:   15   See flow sheet as applicable      79873 Therapeutic Activity (timed):  use of dynamic activities replicating functional movements to increase ROM, strength, coordination, balance, and proprioception in order to improve patient's ability to progress to PLOF and address remaining functional goals.   Tx Min Billable or 1:1 Min   (if diff from Tx Min) Details:   8   See flow sheet as applicable         38   [facility] Totals Reminder: bill using total billable min of TIMED therapeutic procedures (example: do not include dry needle or estim unattended, both untimed codes, in totals to left)  8-22 min = 1 unit; 23-37 min = 2 units; 38-52 min = 3 units; 53-67 min = 4

## 2024-10-07 ENCOUNTER — HOSPITAL ENCOUNTER (OUTPATIENT)
Facility: HOSPITAL | Age: 69
Setting detail: RECURRING SERIES
Discharge: HOME | End: 2024-10-10
Payer: MEDICARE

## 2024-10-07 PROCEDURE — 97110 THERAPEUTIC EXERCISES: CPT

## 2024-10-07 PROCEDURE — 97112 NEUROMUSCULAR REEDUCATION: CPT

## 2024-10-07 PROCEDURE — 97530 THERAPEUTIC ACTIVITIES: CPT

## 2024-10-07 NOTE — PROGRESS NOTES
PHYSICAL / OCCUPATIONAL THERAPY - DAILY TREATMENT NOTE    Patient Name: William Lawrence    Date: 10/7/2024    : 1955  Insurance: Payor: [facility] MEDICARE / Plan: UNITEDHEALTHCARE DUAL COMPLETE / Product Type: *No Product type* /      Patient  verified Yes     Visit #   Current / Total 13 24   Time   In / Out 7:02 7:40   Pain   In / Out 3 10   Subjective Functional Status/Changes: Pt states his shoulder is sore at times, especially when reaching behind himself.      TREATMENT AREA =  Left shoulder pain [M25.512]     OBJECTIVE    Therapeutic Procedures:    76036 Therapeutic Exercise (timed):  increase ROM, strength, coordination, balance, and proprioception to improve patient's ability to progress to PLOF and address remaining functional goals.   Tx Min Billable or 1:1 Min   (if diff from Tx Min) Details:   14  See flow sheet as applicable     34612 Neuromuscular Re-Education (timed):  improve balance, coordination, kinesthetic sense, posture, core stability and proprioception to improve patient's ability to develop conscious control of individual muscles and awareness of position of extremities in order to progress to PLOF and address remaining functional goals.   Tx Min Billable or 1:1 Min   (if diff from Tx Min) Details:   10  See flow sheet as applicable     76626 Therapeutic Activity (timed):  use of dynamic activities replicating functional movements to increase ROM, strength, coordination, balance, and proprioception in order to improve patient's ability to progress to PLOF and address remaining functional goals.   Tx Min Billable or 1:1 Min   (if diff from Tx Min) Details:   14  See flow sheet as applicable       38  [facility] Totals Reminder: bill using total billable min of TIMED therapeutic procedures (example: do not include dry needle or estim unattended, both untimed codes, in totals to left)  8-22 min = 1 unit; 23-37 min = 2 units; 38-52 min = 3 units; 53-67 min = 4 units; 68-82 min = 5 units

## 2024-10-09 ENCOUNTER — HOSPITAL ENCOUNTER (OUTPATIENT)
Facility: HOSPITAL | Age: 69
Setting detail: RECURRING SERIES
Discharge: HOME | End: 2024-10-12
Payer: MEDICARE

## 2024-10-09 PROCEDURE — 97112 NEUROMUSCULAR REEDUCATION: CPT

## 2024-10-09 PROCEDURE — 97110 THERAPEUTIC EXERCISES: CPT

## 2024-10-09 PROCEDURE — 97530 THERAPEUTIC ACTIVITIES: CPT

## 2024-10-09 NOTE — PROGRESS NOTES
PHYSICAL / OCCUPATIONAL THERAPY - DAILY TREATMENT NOTE    Patient Name: William Lawrence    Date: 10/9/2024    : 1955  Insurance: Payor: [facility] MEDICARE / Plan: UNITEDHEALTHCARE DUAL COMPLETE / Product Type: *No Product type* /      Patient  verified Yes     Visit #   Current / Total 14 24   Time   In / Out 7:00 7:39   Pain   In / Out 4 10   Subjective Functional Status/Changes: Pt states his shoulder continues to feel stiff, but notes his shoulder is hurting less overall. Pt states he will apply ice to his shoulder at home post session.     TREATMENT AREA =  Left shoulder pain [M25.512]     OBJECTIVE    Therapeutic Procedures:    06295 Therapeutic Exercise (timed):  increase ROM, strength, coordination, balance, and proprioception to improve patient's ability to progress to PLOF and address remaining functional goals.   Tx Min Billable or 1:1 Min   (if diff from Tx Min) Details:   13  See flow sheet as applicable     94033 Neuromuscular Re-Education (timed):  improve balance, coordination, kinesthetic sense, posture, core stability and proprioception to improve patient's ability to develop conscious control of individual muscles and awareness of position of extremities in order to progress to PLOF and address remaining functional goals.   Tx Min Billable or 1:1 Min   (if diff from Tx Min) Details:   13  See flow sheet as applicable     39098 Therapeutic Activity (timed):  use of dynamic activities replicating functional movements to increase ROM, strength, coordination, balance, and proprioception in order to improve patient's ability to progress to PLOF and address remaining functional goals.   Tx Min Billable or 1:1 Min   (if diff from Tx Min) Details:   13  See flow sheet as applicable       39  [facility] Totals Reminder: bill using total billable min of TIMED therapeutic procedures (example: do not include dry needle or estim unattended, both untimed codes, in totals to left)  8-22 min = 1 unit; 23-37

## 2024-10-14 ENCOUNTER — HOSPITAL ENCOUNTER (OUTPATIENT)
Facility: HOSPITAL | Age: 69
Setting detail: RECURRING SERIES
Discharge: HOME | End: 2024-10-17
Payer: MEDICARE

## 2024-10-14 PROCEDURE — 97110 THERAPEUTIC EXERCISES: CPT

## 2024-10-14 PROCEDURE — 97530 THERAPEUTIC ACTIVITIES: CPT

## 2024-10-14 PROCEDURE — 97112 NEUROMUSCULAR REEDUCATION: CPT

## 2024-10-14 NOTE — PROGRESS NOTES
PHYSICAL / OCCUPATIONAL THERAPY - DAILY TREATMENT NOTE    Patient Name: William Lawrence    Date: 10/14/2024    : 1955  Insurance: Payor: [facility] MEDICARE / Plan: UNITEDHEALTHCARE DUAL COMPLETE / Product Type: *No Product type* /      Patient  verified Yes     Visit #   Current / Total 15 24   Time   In / Out 7:00 7:40   Pain   In / Out 0 10   Subjective Functional Status/Changes: Pt states he has an easier time showering and performing personal hygiene. Pt note he feels like he can move his arm further and is having less pain. Pt notes yesterday he took ibuprofen and a muscle relaxer and that has eliminated his pain. HE last had shoulder pain 2 days ago.      TREATMENT AREA =  Left shoulder pain [M25.512]     OBJECTIVE    Therapeutic Procedures:    06679 Therapeutic Exercise (timed):  increase ROM, strength, coordination, balance, and proprioception to improve patient's ability to progress to PLOF and address remaining functional goals.   Tx Min Billable or 1:1 Min   (if diff from Tx Min) Details:   15  See flow sheet as applicable     23314 Neuromuscular Re-Education (timed):  improve balance, coordination, kinesthetic sense, posture, core stability and proprioception to improve patient's ability to develop conscious control of individual muscles and awareness of position of extremities in order to progress to PLOF and address remaining functional goals.   Tx Min Billable or 1:1 Min   (if diff from Tx Min) Details:   10  See flow sheet as applicable     77792 Therapeutic Activity (timed):  use of dynamic activities replicating functional movements to increase ROM, strength, coordination, balance, and proprioception in order to improve patient's ability to progress to PLOF and address remaining functional goals.   Tx Min Billable or 1:1 Min   (if diff from Tx Min) Details:   15  See flow sheet as applicable       40  [facility] Totals Reminder: bill using total billable min of TIMED therapeutic procedures

## 2024-10-16 ENCOUNTER — HOSPITAL ENCOUNTER (OUTPATIENT)
Facility: HOSPITAL | Age: 69
Setting detail: RECURRING SERIES
Discharge: HOME | End: 2024-10-19
Payer: MEDICARE

## 2024-10-16 PROCEDURE — 97530 THERAPEUTIC ACTIVITIES: CPT

## 2024-10-16 PROCEDURE — 97112 NEUROMUSCULAR REEDUCATION: CPT

## 2024-10-16 PROCEDURE — 97110 THERAPEUTIC EXERCISES: CPT

## 2024-10-16 NOTE — PROGRESS NOTES
68-82 min = 5 units   Total Total          [x]  Patient Education billed concurrently with other procedures   [x] Review HEP    [] Progressed/Changed HEP, detail:    [] Other detail:       Objective Information/Functional Measures/Assessment    Patient is just over three months post op of reverse TSA. Patient continues to be limited with AROM of left shoulder limited to reaching shelves only at shoulder height. Patient is progressing well with AAROM and PROM of left shoulder of AAROM flexion 130 deg, scaption 115 deg.     Patient will continue to benefit from skilled PT / OT services to modify and progress therapeutic interventions, analyze and address functional mobility deficits, analyze and address ROM deficits, analyze and address strength deficits, analyze and address soft tissue restrictions, analyze and cue for proper movement patterns, analyze and modify for postural abnormalities, analyze and address imbalance/dizziness, and instruct in home and community integration to address functional deficits and attain remaining goals.    Progress toward goals / Updated goals:  []  See Progress Note/Recertification    Long Term Goals: To be accomplished in 8 WEEKS  1. Patient's pain level will be 4-5/10 with activity in order to improve patient's ability to perform normal ADLs.  Evaluation:  6/10-10+/10  Current: progressing: best 3/10 worse 10/10. 10/16/24  2. Patient will demonstrate AROM left shoulder flex 0-110, scaption 0-90, IR 0-25, ER 0-50 to increase ease of ADLs.  Evaluation:  AROM left shoulder flex 0-35, scaption 0-42  Current: Progressing: standing flex 92 deg, scap 80 deg, ER (in neutral) 36 10/14/2024  3. Patient will improve his QuickDash score to  below 50 to indicate increased functional mobility.  Evaluation:  QuickDash Score = 98  Current: MET:: QuickDash = 36. 9/18/24  4. Patient will be able to reach to the bottom shelf of a kitchen cabinet in order to perform normal ADLs.  Evaluation:  Unable to

## 2024-10-16 NOTE — PROGRESS NOTES
flexibility/joint mobility, and decrease transfer abilities    Treatment Plan may include any combination of the followin Therapeutic Exercise, 57102 Neuromuscular Re-Education, 61136 Manual Therapy, 22439 Therapeutic Activity, 49444 Self Care/Home Management, 17342 Electrical Stim unattended, and 63367 Gait Training  Patient Goal(s) has been updated and includes: \"I want to be able to reach better in cabinets\"     Goals for this certification period include and are to be achieved in   8  WEEKS    Long Term Goals: To be accomplished in 8 WEEKS  1. Patient's pain level will be 4-5/10 with activity in order to improve patient's ability to perform normal ADLs.  AT Re-cert: progressing: best 3/10 worse 10/10. 10/16/24  2. Patient will demonstrate AROM left shoulder flex 0-110, scaption 0-90, IR 0-25, ER 0-50 to increase ease of ADLs.  AT Re-cert: Progressing: standing flex 92 deg, scap 80 deg, ER (in neutral) 36 10/14/2024  3. Patient will improve with reaching the 3rd shelf in overhead cabinets with 1# weight to increase ease with house hold chores.   AT Re-cert: patient reaches 2nd shelf with 2# of 15 reps. 10/16/2024  4. Patient will be able to reach to the bottom shelf of a kitchen cabinet in order to perform normal ADLs.  AT Re-cert progressing: patient able to reach shelf at shoulder height with extreme difficulty. 10/16/24       Frequency / Duration:   Patient to be seen   2   times per week for   8    WEEKS    Assessments/Recommendations: Patient is just over three months post op of reverse TSA. Patient continues to be limited with AROM of left shoulder limited to reaching shelves only at shoulder height. Patient is progressing well with AAROM and PROM of left shoulder of AAROM flexion 130 deg, scaption 115 deg.     If you have any questions/comments please contact us directly at [phone].   Thank you for allowing us to assist in the care of your patient.    Certification Period:

## 2024-10-21 ENCOUNTER — HOSPITAL ENCOUNTER (OUTPATIENT)
Facility: HOSPITAL | Age: 69
Setting detail: RECURRING SERIES
Discharge: HOME | End: 2024-10-24
Payer: MEDICARE

## 2024-10-21 PROCEDURE — 97110 THERAPEUTIC EXERCISES: CPT

## 2024-10-21 PROCEDURE — 97530 THERAPEUTIC ACTIVITIES: CPT

## 2024-10-21 PROCEDURE — 97112 NEUROMUSCULAR REEDUCATION: CPT

## 2024-10-21 NOTE — PROGRESS NOTES
PHYSICAL / OCCUPATIONAL THERAPY - DAILY TREATMENT NOTE    Patient Name: William Lawrence    Date: 10/21/2024    : 1955  Insurance: Payor: [facility] MEDICARE / Plan: UNITEDHEALTHCARE DUAL COMPLETE / Product Type: *No Product type* /      Patient  verified Yes     Visit #   Current / Total 1 16   Time   In / Out 819 857   Pain   In / Out 2 10   Subjective Functional Status/Changes: Patient states he did not do any of his exercises over the weekend due to his shoulder being sore.      TREATMENT AREA =  Left shoulder pain [M25.512]     OBJECTIVE      Therapeutic Procedures:    Therapeutic Procedures:  75512 Therapeutic Exercise (timed):  increase ROM, strength, coordination, balance, and proprioception to improve patient's ability to progress to PLOF and address remaining functional goals.   Tx Min Billable or 1:1 Min   (if diff from Tx Min) Details:   15   See flow sheet as applicable      75790 Neuromuscular Re-Education (timed):  improve balance, coordination, kinesthetic sense, posture, core stability and proprioception to improve patient's ability to develop conscious control of individual muscles and awareness of position of extremities in order to progress to PLOF and address remaining functional goals.   Tx Min Billable or 1:1 Min   (if diff from Tx Min) Details:   15   See flow sheet as applicable      53950 Therapeutic Activity (timed):  use of dynamic activities replicating functional movements to increase ROM, strength, coordination, balance, and proprioception in order to improve patient's ability to progress to PLOF and address remaining functional goals.   Tx Min Billable or 1:1 Min   (if diff from Tx Min) Details:   8   See flow sheet as applicable         38   [facility] Totals Reminder: bill using total billable min of TIMED therapeutic procedures (example: do not include dry needle or estim unattended, both untimed codes, in totals to left)  8-22 min = 1 unit; 23-37 min = 2 units; 38-52 min = 3

## 2024-10-23 ENCOUNTER — OFFICE VISIT (OUTPATIENT)
Age: 69
End: 2024-10-23

## 2024-10-23 VITALS — RESPIRATION RATE: 16 BRPM | BODY MASS INDEX: 30.02 KG/M2 | HEIGHT: 70 IN

## 2024-10-23 DIAGNOSIS — Z96.612 S/P REVERSE TOTAL SHOULDER ARTHROPLASTY, LEFT: Primary | ICD-10-CM

## 2024-10-23 NOTE — PROGRESS NOTES
[facility] AMBULATORY OFFICE NOTE    Patient: William Lawrence                MRN: [id number]       SSN: [id number]  YOB: 1955        AGE: 69 y.o.        SEX: male  Body mass index is 30.02 kg/m².    PCP: Eileen Giron MD  10/23/24    Chief Complaint: Left shoulder follow-up    HPI: William Lawrence is a 69 y.o. male patient who returns today 3 and half months postop from his left reverse.  He complains of lack of strength.    Past Medical History:   Diagnosis Date    Bronchitis     Chronic . Uses inhaler 3 X week PRN    Hepatitis C 2018    Treated , Tests negative    HIV (human immunodeficiency virus infection) (HCC)     Nondetectable    Osteoarthritis of right hip 01/23/2018    Palpitation 2018 1/22 Controlled with Metoprolol       Family History   Problem Relation Age of Onset    Heart Attack Maternal Grandmother     Heart Attack Paternal Aunt     Heart Attack Brother     Heart Attack Father     Heart Attack Paternal Grandmother     No Known Problems Mother        Current Outpatient Medications   Medication Sig Dispense Refill    vitamin D (ERGOCALCIFEROL) 1.25 MG (60584 UT) CAPS capsule Take 1 capsule by mouth Once a week at 5 PM      metoprolol tartrate (LOPRESSOR) 50 MG tablet Take 1 tablet by mouth 2 times daily (with meals)      meloxicam (MOBIC) 7.5 MG tablet Take 1 tablet by mouth daily (Patient not taking: Reported on 7/1/2024)      clindamycin (CLEOCIN) 150 MG capsule Take 4 capsules by mouth 1 hour prior to appointment 4 capsule PRN    Multiple Vitamins-Minerals (MULTIVITAMIN MEN 50+ PO) Take 1 tablet by mouth Daily      fluticasone-umeclidin-vilant (TRELEGY ELLIPTA) 200-62.5-25 MCG/ACT AEPB inhaler Inhale 1 puff into the lungs daily Rinse and gargle mouth after each use (Patient not taking: Reported on 7/1/2024) 3 each 3    naloxone 4 MG/0.1ML LIQD nasal spray 1 spray by Nasal route as needed for Opioid Reversal      Azelastine HCl 137

## 2024-10-24 ENCOUNTER — HOSPITAL ENCOUNTER (OUTPATIENT)
Facility: HOSPITAL | Age: 69
Setting detail: RECURRING SERIES
Discharge: HOME | End: 2024-10-27
Payer: MEDICARE

## 2024-10-24 PROCEDURE — 97110 THERAPEUTIC EXERCISES: CPT

## 2024-10-24 PROCEDURE — 97112 NEUROMUSCULAR REEDUCATION: CPT

## 2024-10-24 NOTE — PROGRESS NOTES
PHYSICAL / OCCUPATIONAL THERAPY - DAILY TREATMENT NOTE    Patient Name: William Lawrence    Date: 10/24/2024    : 1955  Insurance: Payor: [facility] MEDICARE / Plan: UNITEDHEALTHCARE DUAL COMPLETE / Product Type: *No Product type* /      Patient  verified Yes     Visit #   Current / Total 2 16   Time   In / Out 7:05 7:32   Pain   In / Out 3 3   Subjective Functional Status/Changes: Pt. Has no new complaints today.     TREATMENT AREA =  Left shoulder pain [M25.512]     OBJECTIVE      Therapeutic Procedures:    60416 Therapeutic Exercise (timed):  increase ROM, strength, coordination, balance, and proprioception to improve patient's ability to progress to PLOF and address remaining functional goals.   Tx Min Billable or 1:1 Min   (if diff from Tx Min) Details:   15  See flow sheet as applicable     22054 Neuromuscular Re-Education (timed):  improve balance, coordination, kinesthetic sense, posture, core stability and proprioception to improve patient's ability to develop conscious control of individual muscles and awareness of position of extremities in order to progress to PLOF and address remaining functional goals.   Tx Min Billable or 1:1 Min   (if diff from Tx Min) Details:   8  See flow sheet as applicable       42691 Therapeutic Activity (timed):  use of dynamic activities replicating functional movements to increase ROM, strength, coordination, balance, and proprioception in order to improve patient's ability to progress to PLOF and address remaining functional goals.   Tx Min Billable or 1:1 Min   (if diff from Tx Min) Details:   4  See flow sheet as applicable       27  [facility] Totals Reminder: bill using total billable min of TIMED therapeutic procedures (example: do not include dry needle or estim unattended, both untimed codes, in totals to left)  8-22 min = 1 unit; 23-37 min = 2 units; 38-52 min = 3 units; 53-67 min = 4 units; 68-82 min = 5 units   Total Total     [x]  Patient Education billed

## 2024-10-28 ENCOUNTER — HOSPITAL ENCOUNTER (OUTPATIENT)
Facility: HOSPITAL | Age: 69
Setting detail: RECURRING SERIES
Discharge: HOME | End: 2024-10-31
Payer: MEDICARE

## 2024-10-28 PROCEDURE — 97110 THERAPEUTIC EXERCISES: CPT

## 2024-10-28 PROCEDURE — 97112 NEUROMUSCULAR REEDUCATION: CPT

## 2024-10-28 PROCEDURE — 97530 THERAPEUTIC ACTIVITIES: CPT

## 2024-10-28 NOTE — PROGRESS NOTES
PHYSICAL / OCCUPATIONAL THERAPY - DAILY TREATMENT NOTE    Patient Name: William Lawrence    Date: 10/28/2024    : 1955  Insurance: Payor: [facility] MEDICARE / Plan: UNITEDHEALTHCARE DUAL COMPLETE / Product Type: *No Product type* /      Patient  verified Yes     Visit #   Current / Total 3 16   Time   In / Out 820 858   Pain   In / Out 6 0   Subjective Functional Status/Changes: Patient states the cold weather makes the arthritis in his shoulder hurt.      TREATMENT AREA =  Left shoulder pain [M25.512]     OBJECTIVE      Therapeutic Procedures:  78523 Therapeutic Exercise (timed):  increase ROM, strength, coordination, balance, and proprioception to improve patient's ability to progress to PLOF and address remaining functional goals.   Tx Min Billable or 1:1 Min   (if diff from Tx Min) Details:   15   See flow sheet as applicable      04861 Neuromuscular Re-Education (timed):  improve balance, coordination, kinesthetic sense, posture, core stability and proprioception to improve patient's ability to develop conscious control of individual muscles and awareness of position of extremities in order to progress to PLOF and address remaining functional goals.   Tx Min Billable or 1:1 Min   (if diff from Tx Min) Details:   15   See flow sheet as applicable      42589 Therapeutic Activity (timed):  use of dynamic activities replicating functional movements to increase ROM, strength, coordination, balance, and proprioception in order to improve patient's ability to progress to PLOF and address remaining functional goals.   Tx Min Billable or 1:1 Min   (if diff from Tx Min) Details:   8   See flow sheet as applicable         38   [facility] Totals Reminder: bill using total billable min of TIMED therapeutic procedures (example: do not include dry needle or estim unattended, both untimed codes, in totals to left)  8-22 min = 1 unit; 23-37 min = 2 units; 38-52 min = 3 units; 53-67 min = 4 units; 68-82 min = 5 units   Total

## 2024-10-30 ENCOUNTER — TELEPHONE (OUTPATIENT)
Facility: HOSPITAL | Age: 69
End: 2024-10-30

## 2024-10-30 ENCOUNTER — HOSPITAL ENCOUNTER (OUTPATIENT)
Facility: HOSPITAL | Age: 69
Setting detail: RECURRING SERIES
Discharge: HOME | End: 2024-11-02
Payer: MEDICARE

## 2024-10-30 PROCEDURE — 97112 NEUROMUSCULAR REEDUCATION: CPT

## 2024-10-30 PROCEDURE — 97110 THERAPEUTIC EXERCISES: CPT

## 2024-10-30 PROCEDURE — 97530 THERAPEUTIC ACTIVITIES: CPT

## 2024-10-30 NOTE — PROGRESS NOTES
PHYSICAL / OCCUPATIONAL THERAPY - DAILY TREATMENT NOTE    Patient Name: William Lawrence    Date: 10/30/2024    : 1955  Insurance: Payor: [facility] MEDICARE / Plan: UNITEDHEALTHCARE DUAL COMPLETE / Product Type: *No Product type* /      Patient  verified Yes     Visit #   Current / Total 4 16   Time   In / Out 7:00 7:40   Pain   In / Out 3 10   Subjective Functional Status/Changes: Pt states he has been having less shoulder pain. Pt continues to have pain with activity. Pt notes he has more ease with reaching overhead.      TREATMENT AREA =  Left shoulder pain [M25.512]     OBJECTIVE    Therapeutic Procedures:    61609 Therapeutic Exercise (timed):  increase ROM, strength, coordination, balance, and proprioception to improve patient's ability to progress to PLOF and address remaining functional goals.   Tx Min Billable or 1:1 Min   (if diff from Tx Min) Details:   15  See flow sheet as applicable     98548 Neuromuscular Re-Education (timed):  improve balance, coordination, kinesthetic sense, posture, core stability and proprioception to improve patient's ability to develop conscious control of individual muscles and awareness of position of extremities in order to progress to PLOF and address remaining functional goals.   Tx Min Billable or 1:1 Min   (if diff from Tx Min) Details:   10  See flow sheet as applicable     23909 Therapeutic Activity (timed):  use of dynamic activities replicating functional movements to increase ROM, strength, coordination, balance, and proprioception in order to improve patient's ability to progress to PLOF and address remaining functional goals.   Tx Min Billable or 1:1 Min   (if diff from Tx Min) Details:   15  See flow sheet as applicable       40  [facility] Totals Reminder: bill using total billable min of TIMED therapeutic procedures (example: do not include dry needle or estim unattended, both untimed codes, in totals to left)  8-22 min = 1 unit; 23-37 min = 2 units; 38-52

## 2024-11-04 ENCOUNTER — HOSPITAL ENCOUNTER (OUTPATIENT)
Facility: HOSPITAL | Age: 69
Setting detail: RECURRING SERIES
Discharge: HOME | End: 2024-11-07
Payer: MEDICARE

## 2024-11-04 PROCEDURE — 97112 NEUROMUSCULAR REEDUCATION: CPT

## 2024-11-04 PROCEDURE — 97110 THERAPEUTIC EXERCISES: CPT

## 2024-11-04 PROCEDURE — 97530 THERAPEUTIC ACTIVITIES: CPT

## 2024-11-04 NOTE — PROGRESS NOTES
PHYSICAL / OCCUPATIONAL THERAPY - DAILY TREATMENT NOTE    Patient Name: William Lawrence    Date: 2024    : 1955  Insurance: Payor: [facility] MEDICARE / Plan: UNITEDHEALTHCARE DUAL COMPLETE / Product Type: *No Product type* /      Patient  verified Yes     Visit #   Current / Total 5 16   Time   In / Out 853 931   Pain   In / Out 4 8   Subjective Functional Status/Changes: Patient states he has noticed increased ease with reaching behind him.      TREATMENT AREA =  Left shoulder pain [M25.512]     OBJECTIVE      Therapeutic Procedures:  00478 Therapeutic Exercise (timed):  increase ROM, strength, coordination, balance, and proprioception to improve patient's ability to progress to PLOF and address remaining functional goals.   Tx Min Billable or 1:1 Min   (if diff from Tx Min) Details:   15   See flow sheet as applicable      17103 Neuromuscular Re-Education (timed):  improve balance, coordination, kinesthetic sense, posture, core stability and proprioception to improve patient's ability to develop conscious control of individual muscles and awareness of position of extremities in order to progress to PLOF and address remaining functional goals.   Tx Min Billable or 1:1 Min   (if diff from Tx Min) Details:   15   See flow sheet as applicable      35626 Therapeutic Activity (timed):  use of dynamic activities replicating functional movements to increase ROM, strength, coordination, balance, and proprioception in order to improve patient's ability to progress to PLOF and address remaining functional goals.   Tx Min Billable or 1:1 Min   (if diff from Tx Min) Details:   8   See flow sheet as applicable         38  [facility] Totals Reminder: bill using total billable min of TIMED therapeutic procedures (example: do not include dry needle or estim unattended, both untimed codes, in totals to left)  8-22 min = 1 unit; 23-37 min = 2 units; 38-52 min = 3 units; 53-67 min = 4 units; 68-82 min = 5 units   Total Total

## 2024-11-06 ENCOUNTER — HOSPITAL ENCOUNTER (OUTPATIENT)
Facility: HOSPITAL | Age: 69
Setting detail: RECURRING SERIES
Discharge: HOME | End: 2024-11-09
Payer: MEDICARE

## 2024-11-06 PROCEDURE — 97110 THERAPEUTIC EXERCISES: CPT

## 2024-11-06 PROCEDURE — 97530 THERAPEUTIC ACTIVITIES: CPT

## 2024-11-06 PROCEDURE — 97112 NEUROMUSCULAR REEDUCATION: CPT

## 2024-11-06 NOTE — PROGRESS NOTES
10/28/24  4. Patient will be able to reach to the bottom shelf of a kitchen cabinet in order to perform normal ADLs.  AT Re-cert progressing: patient able to reach shelf at shoulder height with extreme difficulty. 10/16/24  Current: pt can reach into a shelf at shoulder height with some difficulty, pt notes it is getting easier. 11/6/24     Next PN/ RC due 11/14/24; RC 12/11/24  Auth due VARUN    PLAN  - Continue Plan of Care    CAMILLA HAILE PT    11/6/2024    7:10 AM  If an interpreting service was utilized for treatment of this patient, the contents of this document represent the material reviewed with the patient via the .     Future Appointments   Date Time Provider Department Center   11/11/2024  7:00 AM Camilla Haile, PT [facility]   11/14/2024  7:00 AM Camilla Haile PT [facility]   12/31/2024  8:00 AM Armand Rodriguez MD [facility]

## 2024-11-11 ENCOUNTER — HOSPITAL ENCOUNTER (OUTPATIENT)
Facility: HOSPITAL | Age: 69
Setting detail: RECURRING SERIES
Discharge: HOME | End: 2024-11-14
Payer: MEDICARE

## 2024-11-11 PROCEDURE — 97530 THERAPEUTIC ACTIVITIES: CPT

## 2024-11-11 PROCEDURE — 97110 THERAPEUTIC EXERCISES: CPT

## 2024-11-11 PROCEDURE — 97112 NEUROMUSCULAR REEDUCATION: CPT

## 2024-11-11 NOTE — PROGRESS NOTES
PHYSICAL / OCCUPATIONAL THERAPY - DAILY TREATMENT NOTE    Patient Name: William Lawrence    Date: 2024    : 1955  Insurance: Payor: [facility] MEDICARE / Plan: UNITEDHEALTHCARE DUAL COMPLETE / Product Type: *No Product type* /      Patient  verified Yes     Visit #   Current / Total 7 16   Time   In / Out 7:02 7:40   Pain   In / Out 0 4   Subjective Functional Status/Changes: Pt states he did some shoulder strengthening exercises at home over the weekend. Pt notes his shoulder did not hurt over the weekend.      TREATMENT AREA =  Left shoulder pain [M25.512]     OBJECTIVE    Therapeutic Procedures:    45109 Therapeutic Exercise (timed):  increase ROM, strength, coordination, balance, and proprioception to improve patient's ability to progress to PLOF and address remaining functional goals.   Tx Min Billable or 1:1 Min   (if diff from Tx Min) Details:   14  See flow sheet as applicable     30118 Neuromuscular Re-Education (timed):  improve balance, coordination, kinesthetic sense, posture, core stability and proprioception to improve patient's ability to develop conscious control of individual muscles and awareness of position of extremities in order to progress to PLOF and address remaining functional goals.   Tx Min Billable or 1:1 Min   (if diff from Tx Min) Details:   10  See flow sheet as applicable     64712 Therapeutic Activity (timed):  use of dynamic activities replicating functional movements to increase ROM, strength, coordination, balance, and proprioception in order to improve patient's ability to progress to PLOF and address remaining functional goals.   Tx Min Billable or 1:1 Min   (if diff from Tx Min) Details:   14  See flow sheet as applicable       38  [facility] Totals Reminder: bill using total billable min of TIMED therapeutic procedures (example: do not include dry needle or estim unattended, both untimed codes, in totals to left)  8-22 min = 1 unit; 23-37 min = 2 units; 38-52 min = 3  No

## 2024-11-13 ENCOUNTER — APPOINTMENT (OUTPATIENT)
Facility: HOSPITAL | Age: 69
End: 2024-11-13
Payer: MEDICARE

## 2024-11-14 ENCOUNTER — HOSPITAL ENCOUNTER (OUTPATIENT)
Facility: HOSPITAL | Age: 69
Setting detail: RECURRING SERIES
Discharge: HOME | End: 2024-11-17
Payer: MEDICARE

## 2024-11-14 PROCEDURE — 97112 NEUROMUSCULAR REEDUCATION: CPT

## 2024-11-14 PROCEDURE — 97110 THERAPEUTIC EXERCISES: CPT

## 2024-11-14 PROCEDURE — 97530 THERAPEUTIC ACTIVITIES: CPT

## 2024-11-14 NOTE — PROGRESS NOTES
PHYSICAL / OCCUPATIONAL THERAPY - DAILY TREATMENT NOTE    Patient Name: William Lawrence    Date: 2024    : 1955  Insurance: Payor: [facility] MEDICARE / Plan: UNITEDHEALTHCARE DUAL COMPLETE / Product Type: *No Product type* /      Patient  verified Yes     Visit #   Current / Total 8 16   Time   In / Out 815 853   Pain   In / Out 0 0   Subjective Functional Status/Changes: Patient states he is having less pain and able to use his arm more.      TREATMENT AREA =  Left shoulder pain [M25.512]     OBJECTIVE      Therapeutic Procedures:  86962 Therapeutic Exercise (timed):  increase ROM, strength, coordination, balance, and proprioception to improve patient's ability to progress to PLOF and address remaining functional goals.   Tx Min Billable or 1:1 Min   (if diff from Tx Min) Details:   15   See flow sheet as applicable      70762 Neuromuscular Re-Education (timed):  improve balance, coordination, kinesthetic sense, posture, core stability and proprioception to improve patient's ability to develop conscious control of individual muscles and awareness of position of extremities in order to progress to PLOF and address remaining functional goals.   Tx Min Billable or 1:1 Min   (if diff from Tx Min) Details:   15   See flow sheet as applicable      84912 Therapeutic Activity (timed):  use of dynamic activities replicating functional movements to increase ROM, strength, coordination, balance, and proprioception in order to improve patient's ability to progress to PLOF and address remaining functional goals.   Tx Min Billable or 1:1 Min   (if diff from Tx Min) Details:   8   See flow sheet as applicable         38   [facility] Totals Reminder: bill using total billable min of TIMED therapeutic procedures (example: do not include dry needle or estim unattended, both untimed codes, in totals to left)  8-22 min = 1 unit; 23-37 min = 2 units; 38-52 min = 3 units; 53-67 min = 4 units; 68-82 min = 5 units   Total Total

## 2024-11-14 NOTE — THERAPY DISCHARGE
[facility]  [address] [address] Ph: [phone] Fx: [phone]  DISCHARGE SUMMARY  Patient Name: William Lawrence : 1955   Treatment/Medical Diagnosis: Left shoulder pain [M25.512]   Referral Source: Armand Rodriguez MD     Date of Initial Visit: 2024 Attended Visits: 24 Missed Visits: 0     SUMMARY OF TREATMENT  Patient reports he is able to use his arm to put dishes away and reach overhead with no difficulty. Patient has had no pain with activities in the last week and plans to transition to home exercise program.        CURRENT STATUS    1. Patient's pain level will be 4-5/10 with activity in order to improve patient's ability to perform normal ADLs.  AT Re-cert: progressing: best 3/10 worse 10/10. 10/16/24  Current:MET: 0/10 with activities. 24  2. Patient will demonstrate AROM left shoulder flex 0-110, scaption 0-90, IR 0-25, ER 0-50 to increase ease of ADLs.  AT Re-cert: Progressing: standing flex 92 deg, scap 80 deg, ER (in neutral) 36 10/14/2024  Current: MET: standing flex 115 deg, scap 100 deg, ER (in neutral) 50 2024  3. Patient will improve with reaching the 3rd shelf in overhead cabinets with 1# weight to increase ease with house hold chores.   AT Re-cert: patient reaches 2nd shelf with 2# of 15 reps. 10/16/2024  Current: MET; patient able to reach 3rd shelf with 3# for 10 reps. 24  4. Patient will be able to reach to the bottom shelf of a kitchen cabinet in order to perform normal ADLs.  AT Re-cert progressing: patient able to reach shelf at shoulder height with extreme difficulty. 10/16/24  Current: MET: patient reports no difficulty 2024      Medicare: Reporting Period (date from last assessment to current assessment): 10/16/2024-2024    RECOMMENDATIONS  Discontinue therapy. Progressing towards or have reached established goals.    If you have any questions/comments please contact us

## 2024-12-20 ENCOUNTER — TELEPHONE (OUTPATIENT)
Age: 69
End: 2024-12-20

## 2024-12-20 RX ORDER — CLINDAMYCIN HYDROCHLORIDE 300 MG/1
CAPSULE ORAL
Qty: 9 CAPSULE | Refills: 0 | Status: SHIPPED | OUTPATIENT
Start: 2024-12-20

## 2024-12-20 NOTE — TELEPHONE ENCOUNTER
Patient is requesting for antibiotics because he have three dental appts next month a cleaning, a procedure, and something else the patient states.    Patient was asked for the appt dates, patient states that he is not home and could not provide the appt dates.    Patient tel [phone]    [facility]

## 2024-12-31 ENCOUNTER — OFFICE VISIT (OUTPATIENT)
Age: 69
End: 2024-12-31
Payer: MEDICAID

## 2024-12-31 VITALS — RESPIRATION RATE: 16 BRPM | HEIGHT: 70 IN | BODY MASS INDEX: 30.02 KG/M2

## 2024-12-31 DIAGNOSIS — Z96.611 STATUS POST REVERSE TOTAL ARTHROPLASTY OF RIGHT SHOULDER: ICD-10-CM

## 2024-12-31 DIAGNOSIS — Z96.612 S/P REVERSE TOTAL SHOULDER ARTHROPLASTY, LEFT: Primary | ICD-10-CM

## 2024-12-31 PROCEDURE — 99213 OFFICE O/P EST LOW 20 MIN: CPT | Performed by: ORTHOPAEDIC SURGERY

## 2024-12-31 PROCEDURE — 1125F AMNT PAIN NOTED PAIN PRSNT: CPT | Performed by: ORTHOPAEDIC SURGERY

## 2024-12-31 PROCEDURE — 1123F ACP DISCUSS/DSCN MKR DOCD: CPT | Performed by: ORTHOPAEDIC SURGERY

## 2024-12-31 NOTE — PROGRESS NOTES
ARTHROPLASTY; [[facility]], SPIDER, TMAX; NERVE BLOCK; 23 HR performed by Armand Rodriguez MD at [facility] MAIN OR    SHOULDER SURGERY Left 2024    LEFT REVERSE TOTAL SHOULDER ARTHROPLASTY; [[employer] [facility]], SPIDER, TMAX; NERVE BLOCK; 23 HR performed by Armand Rodriguez MD at [facility] MAIN OR    TOTAL HIP ARTHROPLASTY Right 2017       Social History     Socioeconomic History    Marital status:      Spouse name: Not on file    Number of children: Not on file    Years of education: Not on file    Highest education level: Not on file   Occupational History    Not on file   Tobacco Use    Smoking status: Former     Current packs/day: 0.00     Average packs/day: 0.3 packs/day for 22.0 years (5.5 ttl pk-yrs)     Types: Cigarettes     Start date:      Quit date:      Years since quittin.0    Smokeless tobacco: Never   Vaping Use    Vaping status: Never Used   Substance and Sexual Activity    Alcohol use: Not Currently     Comment: Clean 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 ,     Drug use: Not Currently     Types: Heroin     Comment: Clean since     Sexual activity: Not on file   Other Topics Concern    Not on file   Social History Narrative    Not on file     Social Determinants of Health     Financial Resource Strain: Not on file   Food Insecurity: Not on file   Transportation Needs: Not on file   Physical Activity: Not on file   Stress: Not on file   Social Connections: Not on file   Intimate Partner Violence: Not on file   Housing Stability: Not on file       REVIEW OF SYSTEMS:      No changes from previous review of systems unless noted.    PHYSICAL EXAMINATION:  Resp 16   Ht 1.778 m (5' 10\")   BMI 30.02 kg/m²   Body mass index is 30.02 kg/m².  GENERAL: Alert and oriented x3, in no acute distress.  HEENT: Normocephalic, atraumatic.    Left shoulder has near full active range of motion.  Right shoulder is doing well.  No changes.    IMAGING:  Imaging read by myself and interpreted as

## (undated) DEVICE — BLANKET WRM W40.2XL55.9IN IORT LO BODY + MISTRAL AIR

## (undated) DEVICE — Device

## (undated) DEVICE — SUTURE NONABSORBABLE BRAIDED 2-0 CT-2 1X30 IN ETHBND EXCEL X411H

## (undated) DEVICE — STOCKINETTE ORTH W9XL36IN COT 2 PLY HLLW FOR HANDLING LMB

## (undated) DEVICE — PACK PROCEDURE SURG TOT HIP BSHR LF

## (undated) DEVICE — LIQUIBAND RAPID ADHESIVE 36/CS 0.8ML: Brand: MEDLINE

## (undated) DEVICE — GLOVE ORTHO 7 1/2   MSG9475

## (undated) DEVICE — BANDAGE,GAUZE,BULKEE II,4.5"X4.1YD,STRL: Brand: MEDLINE

## (undated) DEVICE — 4-PORT MANIFOLD: Brand: NEPTUNE 2

## (undated) DEVICE — 3M™ STERI-DRAPE™ U-DRAPE 1015: Brand: STERI-DRAPE™

## (undated) DEVICE — APPLICATOR MEDICATED 26 CC SOLUTION HI LT ORNG CHLORAPREP

## (undated) DEVICE — INTENDED FOR TISSUE SEPARATION, AND OTHER PROCEDURES THAT REQUIRE A SHARP SURGICAL BLADE TO PUNCTURE OR CUT.: Brand: BARD-PARKER ®  SAFETY SCALPED

## (undated) DEVICE — STERILE POLYISOPRENE POWDER-FREE SURGICAL GLOVES: Brand: PROTEXIS

## (undated) DEVICE — SUTURE VICRYL SZ 0 L36IN ABSRB UD L36MM CT-1 1/2 CIR J946H

## (undated) DEVICE — SOLUTION IRRIG 1000ML 0.9% SOD CHL USP POUR PLAS BTL

## (undated) DEVICE — KENDALL SCD EXPRESS SLEEVES, KNEE LENGTH, MEDIUM: Brand: KENDALL SCD

## (undated) DEVICE — SHEET,DRAPE,70X100,STERILE: Brand: MEDLINE

## (undated) DEVICE — PIN GLENOID DYNANITE VIP 2.8MM

## (undated) DEVICE — SUTURE VCRL SZ 0 L36IN ABSRB UD L36MM CT-1 1/2 CIR J946H

## (undated) DEVICE — KIT OR TURNOVER

## (undated) DEVICE — SUTURE VICRYL SZ 2-0 L36IN ABSRB UD L36MM CT-1 1/2 CIR J945H

## (undated) DEVICE — (D)SYR 10ML 1/5ML GRAD NSAF -- PKGING CHANGE USE ITEM 338027

## (undated) DEVICE — BIT DRILL 3.0

## (undated) DEVICE — 3M™ BAIR PAWS FLEX™ WARMING GOWN, STANDARD, 20 PER CASE 81003: Brand: BAIR PAWS™

## (undated) DEVICE — GAUZE SPONGES,16 PLY: Brand: CURITY

## (undated) DEVICE — HOOD SURG W/ PEELWY LENS T7

## (undated) DEVICE — DRAPE TWL SURG 16X26IN BLU ORB04] ALLCARE INC]

## (undated) DEVICE — GAUZE,SPONGE,8"X4",12PLY,XRAY,STRL,LF: Brand: MEDLINE

## (undated) DEVICE — NEEDLE HYPO 21GA L1IN GRN S STL HUB POLYPR SHLD REG BVL

## (undated) DEVICE — GAUZE,SPONGE,4"X4",12PLY,STERILE,LF,2'S: Brand: MEDLINE

## (undated) DEVICE — SUTURE VCRL SZ 2-0 L27IN ABSRB VLT L36MM CT-1 1/2 CIR J339H

## (undated) DEVICE — KIT SUT SZ 2 L38IN FIBERWIRE W/ TAPR NDL STR NIT LOOP MIC

## (undated) DEVICE — GOWN,REINFORCED,POLY,AURORA,XLARGE,STRL: Brand: MEDLINE

## (undated) DEVICE — HANDPIECE SET WITH HIGH FLOW TIP AND SUCTION TUBE: Brand: INTERPULSE

## (undated) DEVICE — BRACE SHLDR M FA L135 145IN UNIV AIRMESH BRTH SLNG 15DEG

## (undated) DEVICE — SET PIN MOD GLEN SYS

## (undated) DEVICE — 1010 S-DRAPE TOWEL DRAPE 10/BX: Brand: STERI-DRAPE™

## (undated) DEVICE — SUTURE ETHIBOND NONABSORBABLE BRAIDED 2-0 CT-2 1X30 IN  EXCEL X411H

## (undated) DEVICE — SUTURE VCRL SZ 1 L27IN ABSRB VLT CTX L48MM 1 2 CIR SGL ARMED J365H

## (undated) DEVICE — BANDAGE COBAN 4 IN COMPR W4INXL5YD FOAM COHESIVE QUIK STK SELF ADH SFT

## (undated) DEVICE — SOLUTION IRRIG 3000ML 0.9% SOD CHL FLX CONT 0797208] ICU MEDICAL INC]

## (undated) DEVICE — INTENDED FOR TISSUE SEPARATION, AND OTHER PROCEDURES THAT REQUIRE A SHARP SURGICAL BLADE TO PUNCTURE OR CUT.: Brand: BARD-PARKER SAFETY BLADES SIZE 10, STERILE

## (undated) DEVICE — REM POLYHESIVE ADULT PATIENT RETURN ELECTRODE: Brand: VALLEYLAB

## (undated) DEVICE — (D)PACK ICE DISP -- DISC BY MFR

## (undated) DEVICE — KIT CLN UP BON SECOURS MARYV

## (undated) DEVICE — 2108 SERIES SAGITTAL BLADE (24.8 X 1.24 X 80.1MM)

## (undated) DEVICE — SOLUTION IV 1000ML 0.9% SOD CHL

## (undated) DEVICE — ABDOMINAL PAD: Brand: DERMACEA

## (undated) DEVICE — T4 HOOD

## (undated) DEVICE — BASE CALIB REUSE VIP 5D
Type: IMPLANTABLE DEVICE | Site: SHOULDER | Status: NON-FUNCTIONAL
Removed: 2024-07-01

## (undated) DEVICE — THIN FLEXIBLE NOZZLE

## (undated) DEVICE — NEEDLE SPNL 22GA L3.5IN BLK HUB S STL REG WALL FIT STYL W/

## (undated) DEVICE — LIGHT HANDLE: Brand: DEVON

## (undated) DEVICE — INTENDED FOR TISSUE SEPARATION, AND OTHER PROCEDURES THAT REQUIRE A SHARP SURGICAL BLADE TO PUNCTURE OR CUT.: Brand: BARD-PARKER ® CARBON RIB-BACK BLADES

## (undated) DEVICE — SET ADMIN BLOOD PUMP 110IN --

## (undated) DEVICE — SUTURE VCRL SZ 2-0 L36IN ABSRB UD L36MM CT-1 1/2 CIR J945H

## (undated) DEVICE — 3M™ TEGADERM™ TRANSPARENT FILM DRESSING FRAME STYLE, 1627, 4 IN X 10 IN (10 CM X 25 CM), 20/CT 4CT/CASE: Brand: 3M™ TEGADERM™

## (undated) DEVICE — SLIM BODY SKIN STAPLER: Brand: APPOSE ULC

## (undated) DEVICE — SUTURE MCRYL SZ 3-0 L27IN ABSRB UD L24MM PS-1 3/8 CIR PRIM Y936H

## (undated) DEVICE — TRAY CATH OD16FR SIL URIN M STATLOK STBL DEV SURSTP

## (undated) DEVICE — (D)PREP SKN CHLRAPRP APPL 26ML -- CONVERT TO ITEM 371833

## (undated) DEVICE — 3M™ IOBAN™ 2 ANTIMICROBIAL INCISE DRAPE 6651EZ: Brand: IOBAN™ 2

## (undated) DEVICE — RESERVOIR DRAINAGE 0.25 IN W/ TROCAR

## (undated) DEVICE — SHOULDER STABILIZATION KIT,                                    DISPOSABLE 12 PER BOX

## (undated) DEVICE — BLADE ASSEMB CLP HAIR FINE --

## (undated) DEVICE — Z DISCONTINUED BY MEDLINE USE 2711682 TRAY SKIN PREP DRY W/ PREM GLV

## (undated) DEVICE — HEX-LOCKING BLADE ELECTRODE: Brand: EDGE

## (undated) DEVICE — SUTURE MONOCRYL SZ 3-0 L27IN ABSRB UD L24MM PS-1 3/8 CIR PRIM Y936H